# Patient Record
Sex: MALE | Race: WHITE | NOT HISPANIC OR LATINO | Employment: OTHER | URBAN - METROPOLITAN AREA
[De-identification: names, ages, dates, MRNs, and addresses within clinical notes are randomized per-mention and may not be internally consistent; named-entity substitution may affect disease eponyms.]

---

## 2018-09-25 ENCOUNTER — OFFICE VISIT (OUTPATIENT)
Dept: FAMILY MEDICINE | Facility: CLINIC | Age: 53
End: 2018-09-25
Payer: COMMERCIAL

## 2018-09-25 VITALS
BODY MASS INDEX: 27.46 KG/M2 | RESPIRATION RATE: 16 BRPM | WEIGHT: 155 LBS | TEMPERATURE: 98.1 F | HEART RATE: 102 BPM | OXYGEN SATURATION: 95 % | HEIGHT: 63 IN | DIASTOLIC BLOOD PRESSURE: 105 MMHG | SYSTOLIC BLOOD PRESSURE: 172 MMHG

## 2018-09-25 DIAGNOSIS — I10 ESSENTIAL HYPERTENSION WITH GOAL BLOOD PRESSURE LESS THAN 140/90: ICD-10-CM

## 2018-09-25 DIAGNOSIS — Z00.00 ROUTINE GENERAL MEDICAL EXAMINATION AT A HEALTH CARE FACILITY: Primary | ICD-10-CM

## 2018-09-25 LAB
ALBUMIN SERPL-MCNC: 4.3 G/DL (ref 3.4–5)
ALP SERPL-CCNC: 69 U/L (ref 40–150)
ALT SERPL W P-5'-P-CCNC: 70 U/L (ref 0–70)
ANION GAP SERPL CALCULATED.3IONS-SCNC: 10 MMOL/L (ref 3–14)
AST SERPL W P-5'-P-CCNC: 35 U/L (ref 0–45)
BILIRUB SERPL-MCNC: 1 MG/DL (ref 0.2–1.3)
BUN SERPL-MCNC: 15 MG/DL (ref 7–30)
CALCIUM SERPL-MCNC: 9.4 MG/DL (ref 8.5–10.1)
CHLORIDE SERPL-SCNC: 104 MMOL/L (ref 94–109)
CHOLEST SERPL-MCNC: 270 MG/DL
CO2 SERPL-SCNC: 24 MMOL/L (ref 20–32)
CREAT SERPL-MCNC: 0.97 MG/DL (ref 0.66–1.25)
GFR SERPL CREATININE-BSD FRML MDRD: 80 ML/MIN/1.7M2
GLUCOSE SERPL-MCNC: 117 MG/DL (ref 70–99)
HCV AB SERPL QL IA: NONREACTIVE
HDLC SERPL-MCNC: 51 MG/DL
HIV 1+2 AB+HIV1 P24 AG SERPL QL IA: NONREACTIVE
LDLC SERPL CALC-MCNC: 165 MG/DL
NONHDLC SERPL-MCNC: 219 MG/DL
POTASSIUM SERPL-SCNC: 3.9 MMOL/L (ref 3.4–5.3)
PROT SERPL-MCNC: 8.1 G/DL (ref 6.8–8.8)
SODIUM SERPL-SCNC: 138 MMOL/L (ref 133–144)
TRIGL SERPL-MCNC: 271 MG/DL

## 2018-09-25 PROCEDURE — 86803 HEPATITIS C AB TEST: CPT | Performed by: NURSE PRACTITIONER

## 2018-09-25 PROCEDURE — 99386 PREV VISIT NEW AGE 40-64: CPT | Performed by: NURSE PRACTITIONER

## 2018-09-25 PROCEDURE — 36415 COLL VENOUS BLD VENIPUNCTURE: CPT | Performed by: NURSE PRACTITIONER

## 2018-09-25 PROCEDURE — 80061 LIPID PANEL: CPT | Performed by: NURSE PRACTITIONER

## 2018-09-25 PROCEDURE — 80053 COMPREHEN METABOLIC PANEL: CPT | Performed by: NURSE PRACTITIONER

## 2018-09-25 PROCEDURE — 87389 HIV-1 AG W/HIV-1&-2 AB AG IA: CPT | Performed by: NURSE PRACTITIONER

## 2018-09-25 PROCEDURE — 99213 OFFICE O/P EST LOW 20 MIN: CPT | Mod: 25 | Performed by: NURSE PRACTITIONER

## 2018-09-25 PROCEDURE — 82043 UR ALBUMIN QUANTITATIVE: CPT | Performed by: NURSE PRACTITIONER

## 2018-09-25 RX ORDER — LISINOPRIL 5 MG/1
5 TABLET ORAL DAILY
Qty: 30 TABLET | Refills: 0 | Status: SHIPPED | OUTPATIENT
Start: 2018-09-25 | End: 2018-10-18

## 2018-09-25 NOTE — PROGRESS NOTES
SUBJECTIVE:   CC: Yan Young is an 53 year old male who presents for preventative health visit.     Physical   Annual:     Getting at least 3 servings of Calcium per day:  Yes    Bi-annual eye exam:  Yes    Dental care twice a year:  Yes    Sleep apnea or symptoms of sleep apnea:  None    Diet:  Regular (no restrictions)    Frequency of exercise:  2-3 days/week    Duration of exercise:  Greater than 60 minutes    Taking medications regularly:  Yes    Medication side effects:  None    Additional concerns today:  YES  Discuss possible hernia.  Sometimes patient experience severe lower back pain.    Today's PHQ-2 Score:   PHQ-2 ( 1999 Pf[employer]) 9/25/2018   Q1: Little interest or pleasure in doing things 0   Q2: Feeling down, depressed or hopeless 0   PHQ-2 Score 0   Q1: Little interest or pleasure in doing things Not at all   Q2: Feeling down, depressed or hopeless Not at all   PHQ-2 Score 0     Abuse: Current or Past(Physical, Sexual or Emotional)- No  Do you feel safe in your environment - Yes    Social History   Substance Use Topics     Smoking status: Never Smoker     Smokeless tobacco: Never Used     Alcohol use Yes      Comment: minimall     Alcohol Use 9/25/2018   If you drink alcohol do you typically have greater than 3 drinks per day OR greater than 7 drinks per week? No     Last PSA: No results found for: PSA    Reviewed orders with patient. Reviewed health maintenance and updated orders accordingly - Yes    Reviewed and updated as needed this visit by clinical staff  Tobacco  Allergies  Meds  Problems  Med Hx  Surg Hx  Fam Hx  Soc Hx          Reviewed and updated as needed this visit by Provider  Tobacco  Allergies  Meds  Problems  Med Hx  Surg Hx  Fam Hx  Soc Hx       biometric screen for insurance  Eats a well balanced diet.    Low sodium  BP's have been elevated the the past but he has never taken any BP medications.    Was told his BP's were higher but was OK to wait on starting  "medications.    Brother has high blood pressure.      Review of Systems  CONSTITUTIONAL: NEGATIVE for fever, chills, change in weight  INTEGUMENTARY/SKIN: NEGATIVE for worrisome rashes, moles or lesions  EYES: NEGATIVE for vision changes or irritation  ENT: NEGATIVE for ear, mouth and throat problems  RESP: NEGATIVE for significant cough or SOB  CV: NEGATIVE for chest pain, palpitations or peripheral edema  GI: NEGATIVE for nausea, abdominal pain, heartburn, or change in bowel habits   male: negative for dysuria, hematuria, decreased urinary stream, erectile dysfunction, urethral discharge  MUSCULOSKELETAL: NEGATIVE for significant arthralgias or myalgia  NEURO: NEGATIVE for weakness, dizziness or paresthesias  PSYCHIATRIC: NEGATIVE for changes in mood or affect    OBJECTIVE:   BP (!) 172/105  Pulse 102  Temp 98.1  F (36.7  C) (Oral)  Resp 16  Ht 5' 3\" (1.6 m)  Wt 155 lb (70.3 kg)  SpO2 95%  BMI 27.46 kg/m2    Physical Exam  GENERAL: healthy, alert and no distress  EYES: Eyes grossly normal to inspection, PERRL and conjunctivae and sclerae normal  HENT: ear canals and TM's normal, nose and mouth without ulcers or lesions  NECK: no adenopathy, no asymmetry, masses, or scars and thyroid normal to palpation  RESP: lungs clear to auscultation - no rales, rhonchi or wheezes  CV: regular rate and rhythm, normal S1 S2, no S3 or S4, no murmur, click or rub, no peripheral edema and peripheral pulses strong  ABDOMEN: soft, nontender, no hepatosplenomegaly, no masses and bowel sounds normal   (male): normal male genitalia without lesions or urethral discharge, no hernia  MS: no gross musculoskeletal defects noted, no edema  SKIN: no suspicious lesions or rashes  NEURO: Normal strength and tone, mentation intact and speech normal  PSYCH: mentation appears normal, affect normal/bright      ASSESSMENT/PLAN:       ICD-10-CM    1. Routine general medical examination at a health care facility Z00.00 Hepatitis C antibody " "    HIV Antigen Antibody Combo     Lipid Profile (Chol, Trig, HDL, LDL calc)     Comprehensive metabolic panel     CANCELED: Glucose   2. Essential hypertension with goal blood pressure less than 140/90 I10 lisinopril (PRINIVIL/ZESTRIL) 5 MG tablet     Albumin Random Urine Quantitative with Creat Ratio     OFFICE/OUTPT VISIT,EST,LEVL III      well male,  fasting for labs.  Encouraged to continue exercise and healthy diet choices.      BP markedly elevated today.    Discussed sodium restriction, maintaining ideal body weight and regular exercise program as physiologic means to achieve blood pressure control. The patient will strive towards this. Meanwhile, it is appropriate to lower BP with medications, while observing for therapeutic effect and if appropriate later, can discontinue medications if physiologic methods appear to be effective. The patient indicates understanding of these issues and agrees with the plan. Side effects explained in detail. Continue home readings and return in 1-2 months.  Discussed long term complications of untreated htn.    COUNSELING:   Reviewed preventive health counseling, as reflected in patient instructions    BP Readings from Last 1 Encounters:   09/25/18 (!) 172/105     Estimated body mass index is 27.46 kg/(m^2) as calculated from the following:    Height as of this encounter: 5' 3\" (1.6 m).    Weight as of this encounter: 155 lb (70.3 kg).      Weight management plan: Discussed healthy diet and exercise guidelines and patient will follow up in 3 months in clinic to re-evaluate.     reports that he has never smoked. He has never used smokeless tobacco.      Counseling Resources:  ATP IV Guidelines  Pooled Cohorts Equation Calculator  FRAX Risk Assessment  ICSI Preventive Guidelines  Dietary Guidelines for Americans, 2010  USDA's MyPlate  ASA Prophylaxis  Lung CA Screening    JACKLYN Lowe CNP  [facility]  Answers for HPI/ROS submitted by the patient " on 9/25/2018   PHQ-2 Score: 0

## 2018-09-25 NOTE — MR AVS SNAPSHOT
After Visit Summary   9/25/2018    Yan Young    MRN: [id number]           Patient Information     Date Of Birth          1965        Visit Information        Provider Department      9/25/2018 9:00 AM Kimberly Field APRN [facility]        Today's Diagnoses     Routine general medical examination at a health care facility    -  1    Essential hypertension with goal blood pressure less than 140/90          Care Instructions      Preventive Health Recommendations  Male Ages 50 - 64    Yearly exam:             See your health care provider every year in order to  o   Review health changes.   o   Discuss preventive care.    o   Review your medicines if your doctor has prescribed any.     Have a cholesterol test every 5 years, or more frequently if you are at risk for high cholesterol/heart disease.     Have a diabetes test (fasting glucose) every three years. If you are at risk for diabetes, you should have this test more often.     Have a colonoscopy at age 50, or have a yearly FIT test (stool test). These exams will check for colon cancer.      Talk with your health care provider about whether or not a prostate cancer screening test (PSA) is right for you.    You should be tested each year for STDs (sexually transmitted diseases), if you re at risk.     Shots: Get a flu shot each year. Get a tetanus shot every 10 years.     Nutrition:    Eat at least 5 servings of fruits and vegetables daily.     Eat whole-grain bread, whole-wheat pasta and brown rice instead of white grains and rice.     Get adequate Calcium and Vitamin D.     Lifestyle    Exercise for at least 150 minutes a week (30 minutes a day, 5 days a week). This will help you control your weight and prevent disease.     Limit alcohol to one drink per day.     No smoking.     Wear sunscreen to prevent skin cancer.     See your dentist every six months for an exam and cleaning.     See your eye doctor every 1  "to 2 years.            Follow-ups after your visit        Follow-up notes from your care team     Return in about 4 weeks (around 10/23/2018) for BP Recheck.      Who to contact     If you have questions or need follow up information about today's clinic visit or your schedule please contact [facility] directly at [phone].  Normal or non-critical lab and imaging results will be communicated to you by TearSolutionshart, letter or phone within 4 business days after the clinic has received the results. If you do not hear from us within 7 days, please contact the clinic through Cynvect or phone. If you have a critical or abnormal lab result, we will notify you by phone as soon as possible.  Submit refill requests through Rent[employer]Q or call your pharmacy and they will forward the refill request to us. Please allow 3 business days for your refill to be completed.          Additional Information About Your Visit        [employer] Information     Rent[employer] gives you secure access to your electronic health record. If you see a primary care provider, you can also send messages to your care team and make appointments. If you have questions, please call your primary care clinic.  If you do not have a primary care provider, please call [phone] and they will assist you.        Care EveryWhere ID     This is your Care EveryWhere ID. This could be used by other organizations to access your [facility] medical records  [id number]        Your Vitals Were     Pulse Temperature Respirations Height Pulse Oximetry BMI (Body Mass Index)    102 98.1  F (36.7  C) (Oral) 16 5' 3\" (1.6 m) 95% 27.46 kg/m2       Blood Pressure from Last 3 Encounters:   09/25/18 (!) 172/105   02/16/15 158/88    Weight from Last 3 Encounters:   09/25/18 155 lb (70.3 kg)   02/16/15 163 lb (73.9 kg)              We Performed the Following     Albumin Random Urine Quantitative with Creat Ratio     Comprehensive metabolic panel     Hepatitis C antibody     " HIV Antigen Antibody Combo     Lipid Profile (Chol, Trig, HDL, LDL calc)     OFFICE/OUTPT VISIT,EST,LEVL III          Today's Medication Changes          These changes are accurate as of 9/25/18  2:42 PM.  If you have any questions, ask your nurse or doctor.               Start taking these medicines.        Dose/Directions    lisinopril 5 MG tablet   Commonly known as:  PRINIVIL/ZESTRIL   Used for:  Essential hypertension with goal blood pressure less than 140/90   Started by:  Kimberly Field APRN CNP        Dose:  5 mg   Take 1 tablet (5 mg) by mouth daily   Quantity:  30 tablet   Refills:  0            Where to get your medicines      These medications were sent to [id number] [facility] - [place], [place] - [address] [place] [address]  [address], [place] [address]     Phone:  [phone]     lisinopril 5 MG tablet                Primary Care Provider Office Phone # Fax #    Lenny Kalpesh Parson [phone] [address]  [facility] [address]        Equal Access to Services     [facility]: Hadii aad ku hadasho Soomaali, waaxda luqadaha, qaybta kaalmada adeegyada, waxay idiin haygaetano smallwood . So [facility] [phone].    ATENCIÓN: Si habla español, tiene a maher disposición servicios gratuitos de asistencia lingüística. Llame al [phone].    We comply with applicable federal civil rights laws and [place] laws. We do not discriminate on the basis of race, color, national origin, age, disability, sex, sexual orientation, or gender identity.            Thank you!     Thank you for choosing [facility]  for your care. Our goal is always to provide you with excellent care. Hearing back from our patients is one way we can continue to improve our services. Please take a few minutes to complete the written survey that you may receive in the mail after your visit with us. Thank you!             Your Updated Medication List - Protect others around you:  Learn how to safely use, store and throw away your medicines at www.disposemymeds.org.          This list is accurate as of 9/25/18  2:42 PM.  Always use your most recent med list.                   Brand Name Dispense Instructions for use Diagnosis    lisinopril 5 MG tablet    PRINIVIL/ZESTRIL    30 tablet    Take 1 tablet (5 mg) by mouth daily    Essential hypertension with goal blood pressure less than 140/90

## 2018-09-26 ENCOUNTER — TELEPHONE (OUTPATIENT)
Dept: FAMILY MEDICINE | Facility: CLINIC | Age: 53
End: 2018-09-26

## 2018-09-26 LAB
CREAT UR-MCNC: 124 MG/DL
MICROALBUMIN UR-MCNC: 11 MG/L
MICROALBUMIN/CREAT UR: 8.55 MG/G CR (ref 0–17)

## 2018-10-18 ENCOUNTER — TELEPHONE (OUTPATIENT)
Dept: FAMILY MEDICINE | Facility: CLINIC | Age: 53
End: 2018-10-18

## 2018-10-18 ENCOUNTER — OFFICE VISIT (OUTPATIENT)
Dept: FAMILY MEDICINE | Facility: CLINIC | Age: 53
End: 2018-10-18
Payer: COMMERCIAL

## 2018-10-18 VITALS
RESPIRATION RATE: 16 BRPM | DIASTOLIC BLOOD PRESSURE: 94 MMHG | WEIGHT: 158.5 LBS | TEMPERATURE: 97.9 F | OXYGEN SATURATION: 95 % | HEART RATE: 86 BPM | SYSTOLIC BLOOD PRESSURE: 173 MMHG | BODY MASS INDEX: 28.08 KG/M2

## 2018-10-18 DIAGNOSIS — N50.819 PAIN IN TESTICLE: ICD-10-CM

## 2018-10-18 DIAGNOSIS — N50.819 TESTICLE PAIN: Primary | ICD-10-CM

## 2018-10-18 DIAGNOSIS — I10 ESSENTIAL HYPERTENSION WITH GOAL BLOOD PRESSURE LESS THAN 140/90: Primary | ICD-10-CM

## 2018-10-18 PROCEDURE — 99213 OFFICE O/P EST LOW 20 MIN: CPT | Performed by: NURSE PRACTITIONER

## 2018-10-18 RX ORDER — LISINOPRIL 10 MG/1
10 TABLET ORAL DAILY
Qty: 30 TABLET | Refills: 0 | Status: SHIPPED | OUTPATIENT
Start: 2018-10-18 | End: 2018-11-15

## 2018-10-18 NOTE — MR AVS SNAPSHOT
After Visit Summary   10/18/2018    Yan Young    MRN: [id number]           Patient Information     Date Of Birth          1965        Visit Information        Provider Department      10/18/2018 11:00 AM Kimberly Field APRN CNP [facility]        Today's Diagnoses     Essential hypertension with goal blood pressure less than 140/90    -  1    Pain in testicle           Follow-ups after your visit        Future tests that were ordered for you today     Open Future Orders        Priority Expected Expires Ordered    US Abdomen Limited Routine  10/18/2019 10/18/2018            Who to contact     If you have questions or need follow up information about today's clinic visit or your schedule please contact [facility] directly at [phone].  Normal or non-critical lab and imaging results will be communicated to you by I & Combinehart, letter or phone within 4 business days after the clinic has received the results. If you do not hear from us within 7 days, please contact the clinic through I & Combinehart or phone. If you have a critical or abnormal lab result, we will notify you by phone as soon as possible.  Submit refill requests through [employer] or call your pharmacy and they will forward the refill request to us. Please allow 3 business days for your refill to be completed.          Additional Information About Your Visit        MyChart Information     [employer] gives you secure access to your electronic health record. If you see a primary care provider, you can also send messages to your care team and make appointments. If you have questions, please call your primary care clinic.  If you do not have a primary care provider, please call [phone] and they will assist you.        Care EveryWhere ID     This is your Care EveryWhere ID. This could be used by other organizations to access your [place] medical records  [id number]        Your Vitals Were     Pulse  Temperature Respirations Pulse Oximetry BMI (Body Mass Index)       86 97.9  F (36.6  C) (Oral) 16 95% 28.08 kg/m2        Blood Pressure from Last 3 Encounters:   10/18/18 (!) 173/94   09/25/18 (!) 172/105   02/16/15 158/88    Weight from Last 3 Encounters:   10/18/18 158 lb 8 oz (71.9 kg)   09/25/18 155 lb (70.3 kg)   02/16/15 163 lb (73.9 kg)                 Today's Medication Changes          These changes are accurate as of 10/18/18 12:31 PM.  If you have any questions, ask your nurse or doctor.               These medicines have changed or have updated prescriptions.        Dose/Directions    lisinopril 10 MG tablet   Commonly known as:  PRINIVIL/ZESTRIL   This may have changed:    - medication strength  - how much to take   Used for:  Essential hypertension with goal blood pressure less than 140/90   Changed by:  Kimberly Field APRN CNP        Dose:  10 mg   Take 1 tablet (10 mg) by mouth daily   Quantity:  30 tablet   Refills:  0            Where to get your medicines      These medications were sent to [facility]pharm[facility] #[id number] - [place], [place] - [address], [place] [address]     Phone:  [phone]     lisinopril 10 MG tablet                Primary Care Provider Office Phone # Fax #    Lenny Posey Afshin Parson [phone]       [facility] [address]        Equal Access to Services     [facility]: Hadii rasheed ku hadasho Soomaali, waaxda luqadaha, qaybta kaalmada adeegyada, waxay pastora castillo. So [facility] [phone].    ATENCIÓN: Si habla español, tiene a maher disposición servicios gratuitos de asistencia lingüística. Llame al [phone].    We comply with applicable federal civil rights laws and [place] laws. We do not discriminate on the basis of race, color, national origin, age, disability, sex, sexual orientation, or gender identity.            Thank you!     Thank you for choosing [facility]  for your care. Our goal is always to provide you with excellent care. Hearing back from our patients is one way we can continue to improve our services. Please take a few minutes to complete the written survey that you may receive in the mail after your visit with us. Thank you!             Your Updated Medication List - Protect others around you: Learn how to safely use, store and throw away your medicines at www.disposemymeds.org.          This list is accurate as of 10/18/18 12:31 PM.  Always use your most recent med list.                   Brand Name Dispense Instructions for use Diagnosis    lisinopril 10 MG tablet    PRINIVIL/ZESTRIL    30 tablet    Take 1 tablet (10 mg) by mouth daily    Essential hypertension with goal blood pressure less than 140/90

## 2018-10-18 NOTE — PROGRESS NOTES
SUBJECTIVE:   Yan Young is a 53 year old male who presents to clinic today for the following health issues:      Follow up last OV 9/25/18.   BP's remain elevated.   Reports BP's at home around 160/100 prior to caffeine.      Taking lisinopril daily in the morning.    Wants to get lipids/BMI/ BP/glucose data to get insurance premium reduction.     He is currently failing all measures per the insurers standards.      Also c/o pain in right testicle when he is exerting himself.    Felt a bulge and is wondering if he has a hernia.    When he sat down was able to palpate a small fluid filled sac that he was able to push back in.      Problem list and histories reviewed & adjusted, as indicated.  Additional history: as documented    Reviewed and updated as needed this visit by clinical staff  Tobacco  Allergies  Meds  Problems  Med Hx  Surg Hx  Fam Hx  Soc Hx        Reviewed and updated as needed this visit by Provider  Tobacco  Allergies  Meds  Problems  Med Hx  Surg Hx  Fam Hx  Soc Hx          ROS:  CONSTITUTIONAL: NEGATIVE for fever, chills, change in weight  INTEGUMENTARY/SKIN: NEGATIVE for worrisome rashes, moles or lesions  RESP: NEGATIVE for significant cough or SOB  CV: NEGATIVE for chest pain, palpitations or peripheral edema  MUSCULOSKELETAL: NEGATIVE for significant arthralgias or myalgia    OBJECTIVE:     BP (!) 173/94  Pulse 86  Temp 97.9  F (36.6  C) (Oral)  Resp 16  Wt 158 lb 8 oz (71.9 kg)  SpO2 95%  BMI 28.08 kg/m2  Body mass index is 28.08 kg/(m^2).  GENERAL: healthy, alert and no distress  RESP: lungs clear to auscultation - no rales, rhonchi or wheezes  CV: regular rate and rhythm, normal S1 S2, no S3 or S4, no murmur, click or rub, no peripheral edema and peripheral pulses strong  MS: no gross musculoskeletal defects noted, no edema  SKIN: no suspicious lesions or rashes      ASSESSMENT/PLAN:     Hypertension; slightly worsened   Associated with the following  complications:     Plan:  Medications:     Increase lisinopril to 10 mg, f/u in 1 week.              ICD-10-CM    1. Essential hypertension with goal blood pressure less than 140/90 I10 lisinopril (PRINIVIL/ZESTRIL) 10 MG tablet   2. Pain in testicle N50.819 US Abdomen Limited     BP remains elevated.  Will increase lisinopril to 10 mg daily.    Reduce sodium in the diet.    Weight is up 3 pounds in the last 3 weeks.    For his insurance premium reduction the   BMI threshold was 27.5.  This weight gain placed him outside the parameter for credit.   BP threshold was 140/90, he is also outside this parameter.    Fasting glucose was under 115 and his last fasting glucose was 117, he is also outside this parameter.    Lipid threshold was 135 for non HDL.  His lipids are outside or this parameter.    He is trying to get insurance premium reduction, may come in 1 week for recheck to try to get BP lowered and weight down before the end of the month when the forms are due to the workplace.      See Patient Instructions    JACKLYN Lowe CNP  [facility]

## 2018-10-18 NOTE — TELEPHONE ENCOUNTER
MARY Field the US tech at  requested you change the imaging order to right testicular US with doppler for the imagine appt for tomorrow.  ernie'd up but answer a few questions.   Yasemin Richardson RN

## 2018-10-19 ENCOUNTER — HOSPITAL ENCOUNTER (OUTPATIENT)
Dept: ULTRASOUND IMAGING | Facility: CLINIC | Age: 53
End: 2018-10-19
Attending: NURSE PRACTITIONER
Payer: COMMERCIAL

## 2018-10-19 DIAGNOSIS — N50.819 TESTICLE PAIN: ICD-10-CM

## 2018-10-19 PROCEDURE — 76870 US EXAM SCROTUM: CPT

## 2018-10-29 ENCOUNTER — TELEPHONE (OUTPATIENT)
Dept: FAMILY MEDICINE | Facility: CLINIC | Age: 53
End: 2018-10-29

## 2018-10-29 ENCOUNTER — NURSE TRIAGE (OUTPATIENT)
Dept: NURSING | Facility: CLINIC | Age: 53
End: 2018-10-29

## 2018-10-29 DIAGNOSIS — K40.90 RIGHT INGUINAL HERNIA: Primary | ICD-10-CM

## 2018-10-29 NOTE — TELEPHONE ENCOUNTER
Appears there is a right inguinal hernia. Depending on symptoms, this could correlate. After reviewing Kimberly's note from last visit it seems that the hernia is the cause of his symptoms. Would suggest consultation with general surgery at [facility].  Alternatively could come in to discuss with Kimberly or myself.      Lenny Parson MD  Family Medicine Physician

## 2018-10-29 NOTE — TELEPHONE ENCOUNTER
"Clinic Action Needed:Yes, Please call patient at .   Reason for Call:Patient calling stating he was advised he would receive ultrasound results last week. Patient requesting results. Reports ongoing pain in testicle \"it never went away.\" Denies change or increase in symptoms.     Caller verbalized understanding. Denies further questions.    Kimberly Dominguez RN  [facility]      "

## 2018-10-29 NOTE — TELEPHONE ENCOUNTER
Patient calls back and would like referral to general surgery. My chart referral message sent per his preference.  Yasemin Richardson RN

## 2018-10-29 NOTE — TELEPHONE ENCOUNTER
"Clinic Action Needed:Yes, Please call patient at .   Reason for Call:Patient calling stating he was advised he would receive ultrasound results last week. US testicular and scrotal done 10/19/18.  Patient is requesting results. Reports ongoing pain in testicle \"it never went away.\" Denies change or increase in symptoms.     Caller verbalized understanding. Denies further questions.    Kimberly Dominguez RN  [facility]      "

## 2018-11-09 ENCOUNTER — TELEPHONE (OUTPATIENT)
Dept: SURGERY | Facility: CLINIC | Age: 53
End: 2018-11-09

## 2018-11-09 NOTE — TELEPHONE ENCOUNTER
Pre Visit Call and Assessment    Date of call:  11/09/2018    Phone numbers:  Home number on file [phone] (home)    Reached patient/confirmed appointment:  No - left message:   on voicemail  Patient care team/Primary provider:  Lenny Shaw  Referred to:  Dr. Alexa Enriquez    Reason for visit:  Hernia consult

## 2018-11-12 ENCOUNTER — OFFICE VISIT (OUTPATIENT)
Dept: SURGERY | Facility: CLINIC | Age: 53
End: 2018-11-12
Payer: COMMERCIAL

## 2018-11-12 ENCOUNTER — PATIENT OUTREACH (OUTPATIENT)
Dept: SURGERY | Facility: CLINIC | Age: 53
End: 2018-11-12

## 2018-11-12 VITALS
OXYGEN SATURATION: 95 % | HEART RATE: 115 BPM | BODY MASS INDEX: 28 KG/M2 | SYSTOLIC BLOOD PRESSURE: 165 MMHG | TEMPERATURE: 97.8 F | WEIGHT: 158 LBS | HEIGHT: 63 IN | DIASTOLIC BLOOD PRESSURE: 130 MMHG

## 2018-11-12 DIAGNOSIS — K40.90 UNILATERAL INGUINAL HERNIA WITHOUT OBSTRUCTION OR GANGRENE, RECURRENCE NOT SPECIFIED: Primary | ICD-10-CM

## 2018-11-12 RX ORDER — CEFAZOLIN SODIUM 1 G/50ML
1 INJECTION, SOLUTION INTRAVENOUS SEE ADMIN INSTRUCTIONS
Status: CANCELLED | OUTPATIENT
Start: 2018-11-12

## 2018-11-12 RX ORDER — CEFAZOLIN SODIUM 2 G/50ML
2 SOLUTION INTRAVENOUS
Status: CANCELLED | OUTPATIENT
Start: 2018-11-12

## 2018-11-12 ASSESSMENT — PAIN SCALES - GENERAL: PAINLEVEL: NO PAIN (0)

## 2018-11-12 NOTE — NURSING NOTE
"Chief Complaint   Patient presents with     Consult     [facility]        Vitals:    11/12/18 1250   BP: (!) 165/130   Pulse: 115   Temp: 97.8  F (36.6  C)   TempSrc: Oral   SpO2: 95%   Weight: 158 lb   Height: 5' 3\"       Body mass index is 27.99 kg/(m^2).    Ernesto Mcnair on 11/12/2018 at 12:53 PM                          "

## 2018-11-12 NOTE — PATIENT INSTRUCTIONS
You met with Dr. Alexa Enriquez.      Today's visit instructions:    You will need to meet with your PCP for a pre op history and physical prior to surgery. This will need to be done within 30 days of your surgery date.        If you have questions please contact Jaiden RN or Leia RN during regular clinic hours, Monday through Friday 7:30 AM - 4:00 PM, or you can contact us via ComCam at anytime.       If you have urgent needs after-hours, weekends, or holidays please call the hospital at [phone] and ask to speak with our on-call General Surgery Team.    Appointment [phone], option #1   Nurse Advice (Jaiden or Leia): [phone]   Surgery Scheduler (Keyla): [phone]  Fax: [phone]      After Your Laparoscopic Inguinal Hernia Repair         Incision care     Remove the bandage the day after surgery, but leave the medical tape (Steri-Strips) or glue in place. These will loosen and fall off on their own 5 to 7 days after surgery.     You may take a shower the day after surgery. Carefully wash your incision with soap and water. Do not submerge yourself in water (bath, whirlpool, hot tub, pool, lake) for 14 days after surgery.       Always wash your hands before touching your incisions or removing bandages.      Other medicines     Wait to start aspirin or blood thinners until the day after surgery. You can continue your regular medicines at your normal time the day after surgery.     Your pain medicine may cause constipation (hard, dry stools). To help with this, take the stool softener your doctor gave you or an over-the-counter stool softener or laxative. You can stop taking this when you are no longer taking pain medicine and your bowel movements are back to normal.      For pain or discomfort     Take the narcotic pain medicine your doctor gave you as needed and as instructed on the bottle. If you prefer to use over-the-counter medication, use acetaminophen (Tylenol) or ibuprofen  (Advil, Motrin) as instructed on the box. Do not take Tylenol if it is in your narcotic pain medication.     Use an ice pack on your groin for 20 minutes at a time as needed for the first 24 hours. Be sure to protect your skin by putting a cloth between the ice pack and your skin.     After 24 hours you can switch to heat for 20 minutes as needed. Be sure to protect your skin by putting a cloth between the heat pack and your skin.     It is normal to feel a lump in your groin after surgery. This lump may take up to 8 weeks to go away.      Activities     No driving until you feel it s safe to do so. Don t drive while taking narcotic pain medicine.     You can return to your other activities as normal, no restrictions.      Special equipment     If we gave you an athletic supporter, wear this for the first 3 days after surgery. You can wear it longer than this if you wish.      Diet     You can eat your regular meals after surgery.      When to call the doctor   Call your doctor if you have:     A fever above 101 F (38.3 C) (taken under the tongue), or a fever or chills lasting more than a day.     Redness at the incision site.     Any fluid or blood draining from the incision, especially if it smells bad.      Severe pain that doesn t improve with pain medicine.      Go to the emergency room if:   You can t urinate (pee) or feel pressure when you urinate. We will place a small, thin tube (catheter) to empty your bladder. This needs to stay in place for at least 2 days.      We will call you 2 to 4 days after surgery to review this handout, answer questions and help arrange after-surgery care. If you have questions or concerns, please call [phone] during regular office hours. If you need to call after business hours, call [phone] and ask to page the surgeon on-call.

## 2018-11-12 NOTE — LETTER
"11/12/2018       RE: Yan Young  [address] [place] [address]     Dear Colleague,    Thank you for referring your patient, Yan Young, to the [facility] GENERAL SURGERY at [facility]. Please see a copy of my visit note below.    Surgery Clinic Note    Reason for visit:  Right inguinal hernia    HPI:    Yan Young is a 53M with PMHx significant for HTN, who presents for evaluation of a right inguinal hernia.  He states that he has notice pain in his right groin region for ~ 6 month, which is worse with activity.  He went to his primary care physician last month thinking that there must be something \"torn\" down there.     The patient states that he also notices a right groin bulge when he stands or bears down.  This bulge goes away when he lies flat.  It is intermittently tender.    BP (!) 165/130  Pulse 115  Temp 97.8  F (36.6  C) (Oral)  Ht 1.6 m (5' 3\")  Wt 71.7 kg (158 lb)  SpO2 95%  BMI 27.99 kg/m2  RRR  CTAB  Abd soft, non distended, non tender  R groin- + hernia, no scrotal extension, testes normal and descended, hernia reduces easily with patient lying flat.    A/P:  [facility]  Will schedule for elective laparoscopic inguinal hernia repair at earliest date available.    Will update H&P on day of surgery    Alexa Enriquez  11/12/18    "

## 2018-11-12 NOTE — PROGRESS NOTES
Pre and Post op Patient Education/Teaching Flowsheet  Relevant Diagnosis:  Inguinal hernia  Teaching Topic:  Pre and post op teaching  Person(s) Involved in teaching:  Patient     Motivation Level:  Asks Questions:  Yes  Eager to Learn:  Yes  Cooperative:  Yes  Receptive (willing/able to accept information):  Yes  Any cultural factors/Scientologist beliefs that may influence understanding or compliance?  No    Patient/caregiver/family demonstrates understanding of the following:  Reason for the appointment, diagnosis, and treatment plan:  Yes  Patient demonstrates understanding of the following:  Pre-op bowel prep:  No  Post-op pain management recommendations (medications, ice compress, binder/athletic supporter (if applicable), etc.:  Yes  Inguinal hernia patients:  Post-op urinary retention- discussed signs/symptoms and visit to ER for Triplett catheter placement and to stay in place for at least 48 hours:  Yes  Restrictions:  Yes  Medications to take the day of surgery:  Per PCP  Blood thinner medications discussed and when to stop (if applicable):  Yes  Wound care:  Yes  Diabetes medication management (if applicable):  Per PCP  Which situations necessitate calling provider and whom to contact:  Discussed how to contact the hospital, nurse, and clinic scheduling staff if necessary      Date and time of surgery:  Yes  Location of surgery: [facility]- 5th Floor  History and Physical and any other testing necessary prior to surgery:  Yes. Dr. Enriquez will update the day of surgery.  Required time line for completion of History and Physical and any pre-op testing:  Yes  Discuss need for someone to drive patient home and stay with them for 24 hours:  Yes  Pre-op showering/scrub information with Surgical Scrub:  Yes  NPO Guidelines:  NPO per Anesthesia Guidelines    Infection Prevention: Patient demonstrates understanding of the following:  Patient instructed on hand hygiene:  Yes  Surgical  procedure site care will be taught and will be reviewed at the time of discharge  Signs and symptoms of infection taught:  Yes  Wound care reviewed and will be taught at the time of discharge  Central venous catheter care will be taught at the time of discharge (if applicable)    Post-op follow-up:  Instructional materials used/given/mailed:  [facility] Booklet, post op teaching sheet, Map, Soap, and arrival/location information    Surgical instructions mailed to patient

## 2018-11-12 NOTE — MR AVS SNAPSHOT
After Visit Summary   2018    Yan Young    MRN: [id number]           Patient Information     Date Of Birth          1965        Visit Information        Provider Department      2018 1:00 PM Alexa Enriquez MD [facility] Surgery        Today's Diagnoses     Unilateral inguinal hernia without obstruction or gangrene, recurrence not specified    -  1      Care Instructions    You met with Dr. Alexa Enriquez.      Today's visit instructions:    You will need to meet with your PCP for a pre op history and physical prior to surgery. This will need to be done within 30 days of your surgery date.        If you have questions please contact Jaiden RN or Leia RN during regular clinic hours, Monday through Friday 7:30 AM - 4:00 PM, or you can contact us via Docurated at anytime.       If you have urgent needs after-hours, weekends, or holidays please call the hospital at [phone] and ask to speak with our on-call General Surgery Team.    Appointment [phone], option #1   Nurse Advice (Jaiden or Leia): [phone]   Surgery Scheduler (Keyla): [phone]  Fax: [phone]      After Your Laparoscopic Inguinal Hernia Repair         Incision care     Remove the bandage the day after surgery, but leave the medical tape (Steri-Strips) or glue in place. These will loosen and fall off on their own 5 to 7 days after surgery.     You may take a shower the day after surgery. Carefully wash your incision with soap and water. Do not submerge yourself in water (bath, whirlpool, hot tub, pool, lake) for 14 days after surgery.       Always wash your hands before touching your incisions or removing bandages.      Other medicines     Wait to start aspirin or blood thinners until the day after surgery. You can continue your regular medicines at your normal time the day after surgery.     Your pain medicine may cause constipation (hard, dry stools). To help with this, take  the stool softener your doctor gave you or an over-the-counter stool softener or laxative. You can stop taking this when you are no longer taking pain medicine and your bowel movements are back to normal.      For pain or discomfort     Take the narcotic pain medicine your doctor gave you as needed and as instructed on the bottle. If you prefer to use over-the-counter medication, use acetaminophen (Tylenol) or ibuprofen (Advil, Motrin) as instructed on the box. Do not take Tylenol if it is in your narcotic pain medication.     Use an ice pack on your groin for 20 minutes at a time as needed for the first 24 hours. Be sure to protect your skin by putting a cloth between the ice pack and your skin.     After 24 hours you can switch to heat for 20 minutes as needed. Be sure to protect your skin by putting a cloth between the heat pack and your skin.     It is normal to feel a lump in your groin after surgery. This lump may take up to 8 weeks to go away.      Activities     No driving until you feel it s safe to do so. Don t drive while taking narcotic pain medicine.     You can return to your other activities as normal, no restrictions.      Special equipment     If we gave you an athletic supporter, wear this for the first 3 days after surgery. You can wear it longer than this if you wish.      Diet     You can eat your regular meals after surgery.      When to call the doctor   Call your doctor if you have:     A fever above 101 F (38.3 C) (taken under the tongue), or a fever or chills lasting more than a day.     Redness at the incision site.     Any fluid or blood draining from the incision, especially if it smells bad.      Severe pain that doesn t improve with pain medicine.      Go to the emergency room if:   You can t urinate (pee) or feel pressure when you urinate. We will place a small, thin tube (catheter) to empty your bladder. This needs to stay in place for at least 2 days.      We will call you 2 to 4  "days after surgery to review this handout, answer questions and help arrange after-surgery care. If you have questions or concerns, please call [phone] during regular office hours. If you need to call after business hours, call [phone] and ask to page the surgeon on-call.                                       Follow-ups after your visit        Who to contact     Please call your clinic at [phone] to:    Ask questions about your health    Make or cancel appointments    Discuss your medicines    Learn about your test results    Speak to your doctor            Additional Information About Your Visit        [employer] Information     [employer] gives you secure access to your electronic health record. If you see a primary care provider, you can also send messages to your care team and make appointments. If you have questions, please call your primary care clinic.  If you do not have a primary care provider, please call [phone] and they will assist you.      [employer] is an electronic gateway that provides easy, online access to your medical records. With [employer], you can request a clinic appointment, read your test results, renew a prescription or communicate with your care team.     To access your existing account, please contact your [facility] or call [phone] for assistance.        Care EveryWhere ID     This is your Care EveryWhere ID. This could be used by other organizations to access your [facility] medical records  [id number]        Your Vitals Were     Pulse Temperature Height Pulse Oximetry BMI (Body Mass Index)       115 97.8  F (36.6  C) (Oral) 1.6 m (5' 3\") 95% 27.99 kg/m2        Blood Pressure from Last 3 Encounters:   11/12/18 (!) 165/130   10/18/18 (!) 173/94   09/25/18 (!) 172/105    Weight from Last 3 Encounters:   11/12/18 71.7 kg (158 lb)   10/18/18 71.9 kg (158 lb 8 oz)   09/25/18 70.3 kg (155 lb)              Today, you had the following     No orders " found for display       Primary Care Provider Office Phone # Fax #    Lenny Posey Afshin Parson [phone]       [address]  [facility] [address]        Equal Access to Services     SOLOMON ZUNIGA : Hadii rasheed ku hadyungo Sotabbyali, waaxda luqadaha, qaybta kaalmada adeegyada, daniel idiin hayaan angeles matias laPreethigaetano castillo. So [facility] [phone].    ATENCIÓN: Si habla español, tiene a maher disposición servicios gratuitos de asistencia lingüística. Llame al [phone].    We comply with applicable federal civil rights laws and [place] laws. We do not discriminate on the basis of race, color, national origin, age, disability, sex, sexual orientation, or gender identity.            Thank you!     Thank you for choosing [facility]  for your care. Our goal is always to provide you with excellent care. Hearing back from our patients is one way we can continue to improve our services. Please take a few minutes to complete the written survey that you may receive in the mail after your visit with us. Thank you!             Your Updated Medication List - Protect others around you: Learn how to safely use, store and throw away your medicines at www.disposemymeds.org.          This list is accurate as of 11/12/18  1:22 PM.  Always use your most recent med list.                   Brand Name Dispense Instructions for use Diagnosis    lisinopril 10 MG tablet    PRINIVIL/ZESTRIL    30 tablet    Take 1 tablet (10 mg) by mouth daily    Essential hypertension with goal blood pressure less than 140/90

## 2018-11-12 NOTE — PROGRESS NOTES
"Surgery Clinic Note    Reason for visit:  Right inguinal hernia    HPI:    Yan Young is a 53M with PMHx significant for HTN, who presents for evaluation of a right inguinal hernia.  He states that he has notice pain in his right groin region for ~ 6 month, which is worse with activity.  He went to his primary care physician last month thinking that there must be something \"torn\" down there.     The patient states that he also notices a right groin bulge when he stands or bears down.  This bulge goes away when he lies flat.  It is intermittently tender.    BP (!) 165/130  Pulse 115  Temp 97.8  F (36.6  C) (Oral)  Ht 1.6 m (5' 3\")  Wt 71.7 kg (158 lb)  SpO2 95%  BMI 27.99 kg/m2  RRR  CTAB  Abd soft, non distended, non tender  R groin- + hernia, no scrotal extension, testes normal and descended, hernia reduces easily with patient lying flat.    A/P:  [facility]  Will schedule for elective laparoscopic inguinal hernia repair at earliest date available.    Will update H&P on day of surgery    Alexa Enriquez  11/12/18    "

## 2018-11-14 ENCOUNTER — TELEPHONE (OUTPATIENT)
Dept: SURGERY | Facility: CLINIC | Age: 53
End: 2018-11-14

## 2018-11-14 NOTE — TELEPHONE ENCOUNTER
Patient is scheduled for surgery with Dr. Alexa Enriquez    Left message for: Yan Young about surgery date. Patient had chosen 12/03/2018 when he was seen in clinic on 11/12/2018.    Date of Surgery: 12/03/2018 at 9:05 AM with Dr. Alexa Enriquez    H&P: To be scheduled with Lenny Shaw. Patient is aware that he can call to schedule a pre-op with the [facility] ([facility] if he is unable to schedule with his primary doctor.      Informed patient they will need an adult : YES    Surgery packet sent: Mailed 11/14/2018    Additional comments: He also knows to call general surgery if he experiences any cold or flu symptoms. Surgery teaching and soap were given to in clinic on 11/12/2018. He was asked to call [phone] if he needs to reschedule and [phone] if he experiences any symptoms.

## 2018-11-15 DIAGNOSIS — I10 ESSENTIAL HYPERTENSION WITH GOAL BLOOD PRESSURE LESS THAN 140/90: ICD-10-CM

## 2018-11-15 NOTE — TELEPHONE ENCOUNTER
"Requested Prescriptions   Pending Prescriptions Disp Refills     lisinopril (PRINIVIL/ZESTRIL) 10 MG tablet [Pharmacy Med Name: LISINOPRIL 10 MG TABLET]  Last Written Prescription Date:  10/18/2018  Last Fill Quantity: 30 tablet,  # refills: 0   Last Office Visit: 10/18/2018   Future Office Visit:      30 tablet 0     Sig: TAKE 1 TABLET BY MOUTH EVERY DAY    ACE Inhibitors (Including Combos) Protocol Failed    11/15/2018  4:08 AM       Failed - Blood pressure under 140/90 in past 12 months    BP Readings from Last 3 Encounters:   11/12/18 (!) 165/130   10/18/18 (!) 173/94   09/25/18 (!) 172/105          Passed - Recent (12 mo) or future (30 days) visit within the authorizing provider's specialty    Patient had office visit in the last 12 months or has a visit in the next 30 days with authorizing provider or within the authorizing provider's specialty.  See \"Patient Info\" tab in inbasket, or \"Choose Columns\" in Meds & Orders section of the refill encounter.           Passed - Patient is age 18 or older       Passed - Normal serum creatinine on file in past 12 months    Recent Labs   Lab Test  09/25/18   0941   CR  0.97          Passed - Normal serum potassium on file in past 12 months    Recent Labs   Lab Test  09/25/18   0941   POTASSIUM  3.9               "

## 2018-11-19 ENCOUNTER — MYC REFILL (OUTPATIENT)
Dept: FAMILY MEDICINE | Facility: CLINIC | Age: 53
End: 2018-11-19

## 2018-11-19 RX ORDER — LISINOPRIL 10 MG/1
TABLET ORAL
Qty: 30 TABLET | Refills: 0 | Status: SHIPPED | OUTPATIENT
Start: 2018-11-19 | End: 2018-12-24

## 2018-11-19 RX ORDER — LISINOPRIL 10 MG/1
10 TABLET ORAL DAILY
Qty: 30 TABLET | Refills: 0 | Status: CANCELLED | OUTPATIENT
Start: 2018-11-19

## 2018-11-19 NOTE — TELEPHONE ENCOUNTER
Message from Nicki:  Original authorizing provider: JACKLYN Lowe CNP    Yan Young would like a refill of the following medications:  lisinopril (PRINIVIL/ZESTRIL) 10 MG tablet [JACKLYN Lowe CNP]    Preferred pharmacy: [id number] - [place], [place] - [address]    Comment:

## 2018-11-19 NOTE — TELEPHONE ENCOUNTER
This request has been sent to provider today on another encounter - pending review     Closing this encounter     Christiaansa Greene Registered Nurse   [facility]

## 2018-11-19 NOTE — TELEPHONE ENCOUNTER
Routing refill request to provider for review/approval because:  Blood pressure above threshold.  Ana Laura Cameron RN

## 2018-11-30 ENCOUNTER — ANESTHESIA EVENT (OUTPATIENT)
Dept: SURGERY | Facility: AMBULATORY SURGERY CENTER | Age: 53
End: 2018-11-30

## 2018-12-03 ENCOUNTER — ANESTHESIA (OUTPATIENT)
Dept: SURGERY | Facility: AMBULATORY SURGERY CENTER | Age: 53
End: 2018-12-03

## 2018-12-03 ENCOUNTER — HOSPITAL ENCOUNTER (OUTPATIENT)
Facility: CLINIC | Age: 53
Setting detail: OBSERVATION
Discharge: HOME | End: 2018-12-04
Attending: SURGERY | Admitting: SURGERY
Payer: COMMERCIAL

## 2018-12-03 ENCOUNTER — APPOINTMENT (OUTPATIENT)
Dept: GENERAL RADIOLOGY | Facility: CLINIC | Age: 53
End: 2018-12-03
Attending: SURGERY
Payer: COMMERCIAL

## 2018-12-03 ENCOUNTER — HOSPITAL ENCOUNTER (OUTPATIENT)
Facility: AMBULATORY SURGERY CENTER | Age: 53
End: 2018-12-03
Attending: SURGERY
Payer: COMMERCIAL

## 2018-12-03 VITALS
TEMPERATURE: 98.2 F | BODY MASS INDEX: 27.46 KG/M2 | RESPIRATION RATE: 18 BRPM | HEIGHT: 63 IN | SYSTOLIC BLOOD PRESSURE: 123 MMHG | OXYGEN SATURATION: 93 % | WEIGHT: 155 LBS | DIASTOLIC BLOOD PRESSURE: 79 MMHG | HEART RATE: 92 BPM

## 2018-12-03 DIAGNOSIS — R06.03: ICD-10-CM

## 2018-12-03 DIAGNOSIS — J95.89: ICD-10-CM

## 2018-12-03 DIAGNOSIS — Z87.19 S/P INGUINAL HERNIA REPAIR: Primary | ICD-10-CM

## 2018-12-03 DIAGNOSIS — Z98.890 S/P INGUINAL HERNIA REPAIR: Primary | ICD-10-CM

## 2018-12-03 PROCEDURE — 71045 X-RAY EXAM CHEST 1 VIEW: CPT

## 2018-12-03 PROCEDURE — 25000132 ZZH RX MED GY IP 250 OP 250 PS 637: Performed by: SURGERY

## 2018-12-03 PROCEDURE — G0378 HOSPITAL OBSERVATION PER HR: HCPCS

## 2018-12-03 DEVICE — MESH KNITTED POLYPROPYLENE 06X6" MARLEX 0112720: Type: IMPLANTABLE DEVICE | Site: INGUINAL | Status: FUNCTIONAL

## 2018-12-03 RX ORDER — CEFAZOLIN SODIUM 2 G/50ML
2 SOLUTION INTRAVENOUS
Status: COMPLETED | OUTPATIENT
Start: 2018-12-03 | End: 2018-12-03

## 2018-12-03 RX ORDER — DEXAMETHASONE SODIUM PHOSPHATE 10 MG/ML
INJECTION, SOLUTION INTRAMUSCULAR; INTRAVENOUS PRN
Status: DISCONTINUED | OUTPATIENT
Start: 2018-12-03 | End: 2018-12-03

## 2018-12-03 RX ORDER — ONDANSETRON 2 MG/ML
INJECTION INTRAMUSCULAR; INTRAVENOUS PRN
Status: DISCONTINUED | OUTPATIENT
Start: 2018-12-03 | End: 2018-12-03

## 2018-12-03 RX ORDER — DOCUSATE SODIUM 100 MG/1
100 CAPSULE, LIQUID FILLED ORAL 2 TIMES DAILY
Status: CANCELLED | OUTPATIENT
Start: 2018-12-03

## 2018-12-03 RX ORDER — NALOXONE HYDROCHLORIDE 0.4 MG/ML
.1-.4 INJECTION, SOLUTION INTRAMUSCULAR; INTRAVENOUS; SUBCUTANEOUS
Status: CANCELLED | OUTPATIENT
Start: 2018-12-03

## 2018-12-03 RX ORDER — AMOXICILLIN 250 MG
2 CAPSULE ORAL 2 TIMES DAILY
Status: DISCONTINUED | OUTPATIENT
Start: 2018-12-03 | End: 2018-12-04 | Stop reason: HOSPADM

## 2018-12-03 RX ORDER — NALOXONE HYDROCHLORIDE 0.4 MG/ML
.1-.4 INJECTION, SOLUTION INTRAMUSCULAR; INTRAVENOUS; SUBCUTANEOUS
Status: DISCONTINUED | OUTPATIENT
Start: 2018-12-03 | End: 2018-12-04 | Stop reason: HOSPADM

## 2018-12-03 RX ORDER — ACETAMINOPHEN 325 MG/1
650 TABLET ORAL EVERY 4 HOURS PRN
Status: DISCONTINUED | OUTPATIENT
Start: 2018-12-06 | End: 2018-12-04 | Stop reason: HOSPADM

## 2018-12-03 RX ORDER — HYDROMORPHONE HYDROCHLORIDE 1 MG/ML
.3-.5 INJECTION, SOLUTION INTRAMUSCULAR; INTRAVENOUS; SUBCUTANEOUS
Status: DISCONTINUED | OUTPATIENT
Start: 2018-12-03 | End: 2018-12-04 | Stop reason: HOSPADM

## 2018-12-03 RX ORDER — SCOLOPAMINE TRANSDERMAL SYSTEM 1 MG/1
1 PATCH, EXTENDED RELEASE TRANSDERMAL
Status: DISCONTINUED | OUTPATIENT
Start: 2018-12-03 | End: 2018-12-04 | Stop reason: HOSPADM

## 2018-12-03 RX ORDER — BUPIVACAINE HYDROCHLORIDE 2.5 MG/ML
INJECTION, SOLUTION INFILTRATION; PERINEURAL PRN
Status: DISCONTINUED | OUTPATIENT
Start: 2018-12-03 | End: 2018-12-03 | Stop reason: HOSPADM

## 2018-12-03 RX ORDER — ALBUTEROL SULFATE 90 UG/1
AEROSOL, METERED RESPIRATORY (INHALATION) PRN
Status: DISCONTINUED | OUTPATIENT
Start: 2018-12-03 | End: 2018-12-03

## 2018-12-03 RX ORDER — ACETAMINOPHEN 325 MG/1
650 TABLET ORAL EVERY 4 HOURS PRN
Status: CANCELLED | OUTPATIENT
Start: 2018-12-06

## 2018-12-03 RX ORDER — GABAPENTIN 300 MG/1
300 CAPSULE ORAL ONCE
Status: DISCONTINUED | OUTPATIENT
Start: 2018-12-03 | End: 2018-12-04 | Stop reason: HOSPADM

## 2018-12-03 RX ORDER — OXYCODONE HYDROCHLORIDE 5 MG/1
5-10 TABLET ORAL
Status: DISCONTINUED | OUTPATIENT
Start: 2018-12-03 | End: 2018-12-04 | Stop reason: HOSPADM

## 2018-12-03 RX ORDER — ONDANSETRON 2 MG/ML
4 INJECTION INTRAMUSCULAR; INTRAVENOUS EVERY 6 HOURS PRN
Status: DISCONTINUED | OUTPATIENT
Start: 2018-12-03 | End: 2018-12-04 | Stop reason: HOSPADM

## 2018-12-03 RX ORDER — OXYCODONE HYDROCHLORIDE 5 MG/1
5-10 TABLET ORAL
Status: CANCELLED | OUTPATIENT
Start: 2018-12-03

## 2018-12-03 RX ORDER — PROPOFOL 10 MG/ML
INJECTION, EMULSION INTRAVENOUS PRN
Status: DISCONTINUED | OUTPATIENT
Start: 2018-12-03 | End: 2018-12-03

## 2018-12-03 RX ORDER — ONDANSETRON 2 MG/ML
4 INJECTION INTRAMUSCULAR; INTRAVENOUS EVERY 6 HOURS PRN
Status: DISCONTINUED | OUTPATIENT
Start: 2018-12-03 | End: 2018-12-03

## 2018-12-03 RX ORDER — ACETAMINOPHEN 325 MG/1
975 TABLET ORAL EVERY 8 HOURS
Status: DISCONTINUED | OUTPATIENT
Start: 2018-12-03 | End: 2018-12-04 | Stop reason: HOSPADM

## 2018-12-03 RX ORDER — IPRATROPIUM BROMIDE AND ALBUTEROL SULFATE 2.5; .5 MG/3ML; MG/3ML
3 SOLUTION RESPIRATORY (INHALATION) ONCE
Status: COMPLETED | OUTPATIENT
Start: 2018-12-03 | End: 2018-12-03

## 2018-12-03 RX ORDER — ONDANSETRON 4 MG/1
4 TABLET, ORALLY DISINTEGRATING ORAL EVERY 6 HOURS PRN
Status: DISCONTINUED | OUTPATIENT
Start: 2018-12-03 | End: 2018-12-04 | Stop reason: HOSPADM

## 2018-12-03 RX ORDER — ONDANSETRON 4 MG/1
4 TABLET, ORALLY DISINTEGRATING ORAL EVERY 6 HOURS PRN
Status: CANCELLED | OUTPATIENT
Start: 2018-12-03

## 2018-12-03 RX ORDER — METHOCARBAMOL 750 MG/1
750 TABLET, FILM COATED ORAL 4 TIMES DAILY PRN
Status: CANCELLED | OUTPATIENT
Start: 2018-12-03

## 2018-12-03 RX ORDER — ACETAMINOPHEN 325 MG/1
975 TABLET ORAL ONCE
Status: DISCONTINUED | OUTPATIENT
Start: 2018-12-03 | End: 2018-12-04 | Stop reason: HOSPADM

## 2018-12-03 RX ORDER — PROCHLORPERAZINE MALEATE 5 MG
10 TABLET ORAL EVERY 6 HOURS PRN
Status: DISCONTINUED | OUTPATIENT
Start: 2018-12-03 | End: 2018-12-04 | Stop reason: HOSPADM

## 2018-12-03 RX ORDER — FENTANYL CITRATE 50 UG/ML
25-50 INJECTION, SOLUTION INTRAMUSCULAR; INTRAVENOUS
Status: DISCONTINUED | OUTPATIENT
Start: 2018-12-03 | End: 2018-12-03

## 2018-12-03 RX ORDER — LISINOPRIL 10 MG/1
10 TABLET ORAL DAILY
Status: DISCONTINUED | OUTPATIENT
Start: 2018-12-03 | End: 2018-12-04 | Stop reason: HOSPADM

## 2018-12-03 RX ORDER — OXYCODONE HYDROCHLORIDE 5 MG/1
5 TABLET ORAL EVERY 4 HOURS PRN
Status: DISCONTINUED | OUTPATIENT
Start: 2018-12-03 | End: 2018-12-03

## 2018-12-03 RX ORDER — CEFAZOLIN SODIUM 1 G/50ML
1 SOLUTION INTRAVENOUS SEE ADMIN INSTRUCTIONS
Status: DISCONTINUED | OUTPATIENT
Start: 2018-12-03 | End: 2018-12-04 | Stop reason: HOSPADM

## 2018-12-03 RX ORDER — FENTANYL CITRATE 50 UG/ML
INJECTION, SOLUTION INTRAMUSCULAR; INTRAVENOUS PRN
Status: DISCONTINUED | OUTPATIENT
Start: 2018-12-03 | End: 2018-12-03

## 2018-12-03 RX ORDER — AMOXICILLIN 250 MG
1 CAPSULE ORAL 2 TIMES DAILY
Status: DISCONTINUED | OUTPATIENT
Start: 2018-12-03 | End: 2018-12-04 | Stop reason: HOSPADM

## 2018-12-03 RX ORDER — SODIUM CHLORIDE 9 MG/ML
INJECTION, SOLUTION INTRAVENOUS CONTINUOUS
Status: CANCELLED | OUTPATIENT
Start: 2018-12-03

## 2018-12-03 RX ORDER — ONDANSETRON 2 MG/ML
4 INJECTION INTRAMUSCULAR; INTRAVENOUS EVERY 6 HOURS PRN
Status: CANCELLED | OUTPATIENT
Start: 2018-12-03

## 2018-12-03 RX ORDER — LIDOCAINE HYDROCHLORIDE 20 MG/ML
INJECTION, SOLUTION INFILTRATION; PERINEURAL PRN
Status: DISCONTINUED | OUTPATIENT
Start: 2018-12-03 | End: 2018-12-03

## 2018-12-03 RX ORDER — METHOCARBAMOL 750 MG/1
750 TABLET, FILM COATED ORAL 4 TIMES DAILY PRN
Status: DISCONTINUED | OUTPATIENT
Start: 2018-12-03 | End: 2018-12-04 | Stop reason: HOSPADM

## 2018-12-03 RX ORDER — LIDOCAINE 40 MG/G
CREAM TOPICAL
Status: DISCONTINUED | OUTPATIENT
Start: 2018-12-03 | End: 2018-12-03

## 2018-12-03 RX ORDER — NALOXONE HYDROCHLORIDE 0.4 MG/ML
.1-.4 INJECTION, SOLUTION INTRAMUSCULAR; INTRAVENOUS; SUBCUTANEOUS
Status: DISCONTINUED | OUTPATIENT
Start: 2018-12-03 | End: 2018-12-03

## 2018-12-03 RX ORDER — GABAPENTIN 300 MG/1
300 CAPSULE ORAL 2 TIMES DAILY
Status: CANCELLED | OUTPATIENT
Start: 2018-12-03 | End: 2018-12-06

## 2018-12-03 RX ORDER — OXYCODONE HYDROCHLORIDE 5 MG/1
5 TABLET ORAL ONCE
Status: COMPLETED | OUTPATIENT
Start: 2018-12-03 | End: 2018-12-03

## 2018-12-03 RX ORDER — SODIUM CHLORIDE, SODIUM LACTATE, POTASSIUM CHLORIDE, CALCIUM CHLORIDE 600; 310; 30; 20 MG/100ML; MG/100ML; MG/100ML; MG/100ML
INJECTION, SOLUTION INTRAVENOUS CONTINUOUS PRN
Status: DISCONTINUED | OUTPATIENT
Start: 2018-12-03 | End: 2018-12-03

## 2018-12-03 RX ORDER — LABETALOL HYDROCHLORIDE 5 MG/ML
INJECTION, SOLUTION INTRAVENOUS PRN
Status: DISCONTINUED | OUTPATIENT
Start: 2018-12-03 | End: 2018-12-03

## 2018-12-03 RX ORDER — LIDOCAINE 40 MG/G
CREAM TOPICAL
Status: DISCONTINUED | OUTPATIENT
Start: 2018-12-03 | End: 2018-12-04 | Stop reason: HOSPADM

## 2018-12-03 RX ORDER — ACETAMINOPHEN 325 MG/1
975 TABLET ORAL EVERY 8 HOURS
Status: CANCELLED | OUTPATIENT
Start: 2018-12-03 | End: 2018-12-06

## 2018-12-03 RX ORDER — PROPOFOL 10 MG/ML
INJECTION, EMULSION INTRAVENOUS CONTINUOUS PRN
Status: DISCONTINUED | OUTPATIENT
Start: 2018-12-03 | End: 2018-12-03

## 2018-12-03 RX ORDER — LIDOCAINE 40 MG/G
CREAM TOPICAL
Status: CANCELLED | OUTPATIENT
Start: 2018-12-03

## 2018-12-03 RX ORDER — KETOROLAC TROMETHAMINE 30 MG/ML
INJECTION, SOLUTION INTRAMUSCULAR; INTRAVENOUS PRN
Status: DISCONTINUED | OUTPATIENT
Start: 2018-12-03 | End: 2018-12-03

## 2018-12-03 RX ORDER — ONDANSETRON 4 MG/1
4 TABLET, ORALLY DISINTEGRATING ORAL EVERY 6 HOURS PRN
Status: DISCONTINUED | OUTPATIENT
Start: 2018-12-03 | End: 2018-12-03

## 2018-12-03 RX ADMIN — ACETAMINOPHEN 975 MG: 325 TABLET, FILM COATED ORAL at 17:58

## 2018-12-03 RX ADMIN — FENTANYL CITRATE 50 MCG: 50 INJECTION, SOLUTION INTRAMUSCULAR; INTRAVENOUS at 10:38

## 2018-12-03 RX ADMIN — CEFAZOLIN SODIUM 1 G: 2 SOLUTION INTRAVENOUS at 12:40

## 2018-12-03 RX ADMIN — Medication 10 MG: at 11:34

## 2018-12-03 RX ADMIN — Medication 0.2 MG: at 11:48

## 2018-12-03 RX ADMIN — ALBUTEROL SULFATE 4 PUFF: 90 INHALANT RESPIRATORY (INHALATION) at 13:42

## 2018-12-03 RX ADMIN — Medication 20 MG: at 12:20

## 2018-12-03 RX ADMIN — SODIUM CHLORIDE, SODIUM LACTATE, POTASSIUM CHLORIDE, CALCIUM CHLORIDE: 600; 310; 30; 20 INJECTION, SOLUTION INTRAVENOUS at 08:55

## 2018-12-03 RX ADMIN — CEFAZOLIN SODIUM 2 G: 2 SOLUTION INTRAVENOUS at 10:54

## 2018-12-03 RX ADMIN — KETOROLAC TROMETHAMINE 30 MG: 30 INJECTION, SOLUTION INTRAMUSCULAR; INTRAVENOUS at 13:53

## 2018-12-03 RX ADMIN — PROPOFOL: 10 INJECTION, EMULSION INTRAVENOUS at 11:27

## 2018-12-03 RX ADMIN — SENNOSIDES AND DOCUSATE SODIUM 1 TABLET: 8.6; 5 TABLET ORAL at 20:12

## 2018-12-03 RX ADMIN — DEXAMETHASONE SODIUM PHOSPHATE 6 MG: 10 INJECTION, SOLUTION INTRAMUSCULAR; INTRAVENOUS at 12:06

## 2018-12-03 RX ADMIN — DEXAMETHASONE SODIUM PHOSPHATE 4 MG: 10 INJECTION, SOLUTION INTRAMUSCULAR; INTRAVENOUS at 10:56

## 2018-12-03 RX ADMIN — PROPOFOL 90 MCG/KG/MIN: 10 INJECTION, EMULSION INTRAVENOUS at 12:07

## 2018-12-03 RX ADMIN — LABETALOL HYDROCHLORIDE 15 MG: 5 INJECTION, SOLUTION INTRAVENOUS at 10:49

## 2018-12-03 RX ADMIN — FENTANYL CITRATE 50 MCG: 50 INJECTION, SOLUTION INTRAMUSCULAR; INTRAVENOUS at 10:47

## 2018-12-03 RX ADMIN — Medication 0.3 MG: at 11:00

## 2018-12-03 RX ADMIN — IPRATROPIUM BROMIDE AND ALBUTEROL SULFATE 3 ML: 2.5; .5 SOLUTION RESPIRATORY (INHALATION) at 14:43

## 2018-12-03 RX ADMIN — OXYCODONE HYDROCHLORIDE 10 MG: 5 TABLET ORAL at 20:12

## 2018-12-03 RX ADMIN — OXYCODONE HYDROCHLORIDE 10 MG: 5 TABLET ORAL at 23:06

## 2018-12-03 RX ADMIN — OXYCODONE HYDROCHLORIDE 5 MG: 5 TABLET ORAL at 15:10

## 2018-12-03 RX ADMIN — Medication 35 MG: at 10:42

## 2018-12-03 RX ADMIN — PROPOFOL 150 MCG/KG/MIN: 10 INJECTION, EMULSION INTRAVENOUS at 10:36

## 2018-12-03 RX ADMIN — Medication 15 MG: at 11:00

## 2018-12-03 RX ADMIN — FENTANYL CITRATE 25 MCG: 50 INJECTION, SOLUTION INTRAMUSCULAR; INTRAVENOUS at 15:14

## 2018-12-03 RX ADMIN — PROPOFOL 40 MG: 10 INJECTION, EMULSION INTRAVENOUS at 12:17

## 2018-12-03 RX ADMIN — LIDOCAINE HYDROCHLORIDE 100 MG: 20 INJECTION, SOLUTION INFILTRATION; PERINEURAL at 10:41

## 2018-12-03 RX ADMIN — ONDANSETRON 4 MG: 2 INJECTION INTRAMUSCULAR; INTRAVENOUS at 10:56

## 2018-12-03 RX ADMIN — FENTANYL CITRATE 25 MCG: 50 INJECTION, SOLUTION INTRAMUSCULAR; INTRAVENOUS at 15:12

## 2018-12-03 RX ADMIN — LISINOPRIL 10 MG: 10 TABLET ORAL at 17:58

## 2018-12-03 RX ADMIN — SODIUM CHLORIDE, SODIUM LACTATE, POTASSIUM CHLORIDE, CALCIUM CHLORIDE: 600; 310; 30; 20 INJECTION, SOLUTION INTRAVENOUS at 11:56

## 2018-12-03 RX ADMIN — ALBUTEROL SULFATE 6 PUFF: 90 INHALANT RESPIRATORY (INHALATION) at 11:42

## 2018-12-03 RX ADMIN — SCOLOPAMINE TRANSDERMAL SYSTEM 1 PATCH: 1 PATCH, EXTENDED RELEASE TRANSDERMAL at 08:41

## 2018-12-03 RX ADMIN — PROPOFOL 220 MG: 10 INJECTION, EMULSION INTRAVENOUS at 10:41

## 2018-12-03 ASSESSMENT — PAIN DESCRIPTION - DESCRIPTORS
DESCRIPTORS: SORE

## 2018-12-03 NOTE — DISCHARGE INSTRUCTIONS
[facility]  Home Care Following Anesthesia  For 24 hours after surgery:  1. Get plenty of rest.  A responsible adult must stay with you for at least 24 hours after you leave the surgery center.  2. Do not drive or use heavy equipment.  If you have weakness or tingling, don't drive or use heavy equipment until this feeling goes away.   3. Do not drink alcohol.   4. Avoid strenuous or risky activities.  Ask for help when climbing stairs.  5. You may feel lightheaded.  IF so, sit for a few minutes before standing.  Have someone help you get up.   6. If you have nausea (feel sick to your stomach): Drink only clear liquids such as apple juice, ginger ale, broth or 7-Up.  Rest may also help.  Be sure to drink enough fluids.  Move to a regular diet as you feel able.   7. You may have a slight fever.  Call the doctor if your fever is over 100 F (37.7 C) (taken under the tongue) or lasts longer than 24 hours.  8. You may have a dry mouth, a sore throat, muscle aches or trouble sleeping. These should go away after 24 hours.  9. Do not make important or legal decisions.     Tips for taking pain medications  To get the best pain relief possible, remember these points:    Take pain medications as directed, before pain becomes severe.    Pain medication can upset your stomach: taking it with food may help.    Constipation is a common side effect of pain medication. Drink plenty of  fluids.    Eat foods high in fiber. Take a stool softener if recommended by your doctor or pharmacist.    Do not drink alcohol, drive or operate machinery while taking pain medications.    Ask about other ways to control pain, such as with heat, ice or relaxation.    Tylenol/Acetaminophen Consumption  To help encourage the safe use of acetaminophen, the makers of TYLENOL  have lowered the maximum daily dose for single-ingredient Extra Strength TYLENOL  (acetaminophen) products sold in the [place] from 8 pills per day  (4,000 mg) to 6 pills per day (3,000 mg). The dosing interval has also changed from 2 pills every 4-6 hours to 2 pills every 6 hours.    If you feel your pain relief is insufficient, you may take Tylenol/Acetaminophen in addition to your narcotic pain medication.     Be careful not to exceed 3,000 mg of Tylenol/Acetaminophen in a 24 hour period from all sources.    If you are taking extra strength Tylenol/acetaminophen (500 mg), the maximum dose is 6 tablets in 24 hours.    If you are taking regular strength acetaminophen (325 mg), the maximum dose is 9 tablets in 24 hours.    Oxycodone 5mg given at 3:10pm    Call a doctor for any of the followin. Signs of infection (fever, growing tenderness at the surgery site, a large amount of drainage or bleeding, severe pain, foul-smelling drainage, redness, swelling).  2. It has been over 8 to 10 hours since surgery and you are still not able to urinate (pass water).  3. Headache for over 24 hours.      Your doctor is:  Dr Alexa Enriquez [phone]               Or dial [phone] and ask for the resident on call for:  General Surgery  For emergency care, call the:  [facility]:  [phone] (TTY for hearing impaired: [phone])      After Your Open Inguinal Hernia Repair         Incision care     You may take a shower the day after surgery. Carefully wash your incision with soap and water. Do not submerge yourself in water (bath, whirlpool, hot tub, pool, lake) for 14 days after surgery.     Remove the bandage the day after surgery, but leave the medical tape (Steri-Strips) or glue in place. These will loosen and fall off on their own 5 to 7 days after surgery.      Always wash your hands before touching your incisions or removing bandages.     It is not unusual to form a collection of fluid or blood under your incision that may feel firm or squishy- it can take several weeks to months for your body to reabsorb it.  At times, it may even drain.   If that should happen keep the area clean with soap, water,  and cover with a clean gauze dressing. You can change this daily or as needed.     Other medicines     Wait to start aspirin or blood thinners until the day after surgery. You can take any other regular medicines at your normal time the day after surgery.     Your pain medicine may cause constipation (hard, dry stools). To help with this, take the stool softener your doctor gave you or an over-the-counter stool softener or laxative. You can stop taking this when you are no longer taking pain medicine and your bowel movements are back to normal.      For pain or discomfort     Take the narcotic pain medicine your doctor gave you as needed and as instructed on the bottle. If you prefer to use over-the-counter medication, use acetaminophen (Tylenol) or ibuprofen (Advil, Motrin) as instructed on the box. Do not take Tylenol if it is in your narcotic pain medication.      Use an ice pack on your groin for 20 minutes at a time as needed for the first 24 hours. Be sure to protect your skin by putting a cloth between the ice pack and your skin.     After 24 hours you can switch to heat for 20 minutes as needed. Be sure to protect your skin by putting a cloth between the heat pack and your skin.      It is normal to feel a lump in your groin after surgery. This lump may take up to 8 weeks to go away.      Activities     No driving until you feel it s safe to do so. Don t drive while taking narcotic pain medicine.     Don t lift anything heavier than 20 pounds for 3 weeks after surgery.      Special equipment     If we gave you an athletic supporter, wear this for the first 3 days after surgery. You can wear it longer than this if you wish.     If you left the hospital in VERNON socks (compression stockings), you may take them off the day after surgery.      Diet   You can eat your regular meals after surgery.      When to call the doctor   Call your doctor if you  have:     A fever above 101 F (38.3 C) (taken under the tongue), or a fever or chills lasting more than a day.     Redness at the incision site.     Any fluid or blood draining from the incision, especially if it smells bad.      Severe pain that doesn t improve with pain medicine.      We will call you 2 to 4 days after surgery to review this handout, answer questions and help arrange after-surgery care. If you have questions or concerns, please call [phone] during regular office hours. If you need to call after business hours, call [phone] and ask to page the surgeon on-call.

## 2018-12-03 NOTE — IP AVS SNAPSHOT
MRN:[id number]                      After Visit Summary   12/3/2018    Yan Young    MRN: [id number]           Thank you!     Thank you for choosing [facility] for your care. Our goal is always to provide you with excellent care. Hearing back from our patients is one way we can continue to improve our services. Please take a few minutes to complete the written survey that you may receive in the mail after you visit with us. Thank you!        Patient Information     Date Of Birth          1965        Designated Caregiver       Most Recent Value    Caregiver    Will someone help with your care after discharge? no      About your hospital stay     You were admitted on:  December 3, 2018 You last received care in the:  Unit 6D Observation [facility]    You were discharged on:  December 4, 2018        Reason for your hospital stay       Post-op extubation difficulty                  Who to Call     For medical emergencies, please call 911.  For non-urgent questions about your medical care, please call your primary care provider or clinic, [phone]          Attending Provider     Provider Specialty    Vasiliy Sanders MD General Surgery       Primary Care Provider Office Phone # Fax #    Lenny Kalpesh Parson [phone]      After Care Instructions     Activity       Your activity upon discharge: activity as tolerated, no heavy lifting of more than 10 lbs for next 4-6 weeks and until cleared by            Diet       Follow this diet upon discharge: Regular diet                  Follow-up Appointments     Adult [facility] Follow-up and recommended labs and tests       Discharge Instructions  Activity  Activity as tolerated, no heavy lifting of more than 10 lbs for 4-6 weeks and cleared by surgeon.    Incisions  Laparoscopy sites have dermabond and self-absorbing sutures.    Medications  - Do not take any additional Tylenol (acetaminophen) while using a narcotic  pain medication which includes acetaminophen  - Do not take more than 4,000mg of acetaminophen in any 24 hour period, as this can cause liver damage  - Take stool softeners such as Senna or Miralax while you are using narcotics, but stop if you develop diarrhea  - Wean yourself off of narcotic pain medications    Follow-Up:  - Call your primary care provider to touch base regarding your recent admission.     - Call or return sooner than your regularly scheduled visit if you develop any of the following: fever >101.5, uncontrolled pain, uncontrolled nausea or vomiting, as well as increased redness, swelling, or drainage from your wound.   -  A nurse from the General Surgery Clinic will contact you 24 hours, or next business day, after your discharge from the hospital.  If you have questions or to schedule a follow up appointment please call the General Surgery Clinic at [phone].  Call [phone] and ask to speak with the Surgery resident on call if you are having troubles in the evenings, at night, or on the weekend.  -  If you are receiving home care please inform your home care nurse of our contact number.                  Pending Results     Date and Time Order Name Status Description    12/4/2018 0816 CT Chest Pulmonary Embolism w Contrast In process     12/3/2018 1247 Surgical pathology exam In process             Statement of Approval     Ordered          12/04/18 1431  I have reviewed and agree with all the recommendations and orders detailed in this document.  EFFECTIVE NOW     Approved and electronically signed by:  Hair Khan MD             Admission Information     Date & Time Provider Department Dept. Phone    12/3/2018 Vasiliy Sanders MD Unit 6D Observation [facility] [phone]      Your Vitals Were     Blood Pressure Temperature Respirations Pulse Oximetry          152/81 (BP Location: Left arm) 98.8  F (37.1  C) (Oral) 18 91%        MyChart Information     MyChart gives you  secure access to your electronic health record. If you see a primary care provider, you can also send messages to your care team and make appointments. If you have questions, please call your primary care clinic.  If you do not have a primary care provider, please call [phone] and they will assist you.        Care EveryWhere ID     This is your Care EveryWhere ID. This could be used by other organizations to access your [place] medical records  [id number]        Equal Access to Services     SOLOMON ZUNIGA : William gonzalezo Soroselia, waaxda luqadaha, qaybta kaalmada lana, daniel castillo. So [facility] [phone].    ATENCIÓN: Si wendyla beni, tiene a maher disposición servicios gratuitos de asistencia lingüística. Llame al [phone].    We comply with applicable federal civil rights laws and [place] laws. We do not discriminate on the basis of race, color, national origin, age, disability, sex, sexual orientation, or gender identity.               Review of your medicines      START taking        Dose / Directions    acetaminophen 325 MG tablet   Commonly known as:  TYLENOL        Dose:  650 mg   Start taking on:  12/6/2018   Take 2 tablets (650 mg) by mouth every 4 hours as needed for other or pain (multimodal surgical pain management along with NSAIDS and opioid medication as indicated based on pain control and physical function.)   Quantity:  100 tablet   Refills:  1       oxyCODONE 5 MG tablet   Commonly known as:  ROXICODONE        Dose:  5 mg   Take 1 tablet (5 mg) by mouth every 6 hours as needed for breakthrough pain (Take one tablet as needed every 6 hours for breakthrough pain if pain poorly controlled with tylenol alone)   Quantity:  15 tablet   Refills:  0       senna-docusate 8.6-50 MG tablet   Commonly known as:  SENOKOT-S/PERICOLACE        Dose:  1 tablet   Take 1 tablet by mouth 2 times daily as needed for constipation (Please take for stool softening regimen  while on oxycodone)   Quantity:  100 tablet   Refills:  0         CONTINUE these medicines which have NOT CHANGED        Dose / Directions    lisinopril 10 MG tablet   Commonly known as:  PRINIVIL/ZESTRIL   Used for:  Essential hypertension with goal blood pressure less than 140/90        TAKE 1 TABLET BY MOUTH EVERY DAY   Quantity:  30 tablet   Refills:  0            Where to get your medicines      These medications were sent to [facility] [place], [place] - [address]  [address] [address]     Phone:  [phone]     senna-docusate 8.6-50 MG tablet         Some of these will need a paper prescription and others can be bought over the counter. Ask your nurse if you have questions.     Bring a paper prescription for each of these medications     acetaminophen 325 MG tablet    oxyCODONE 5 MG tablet                Protect others around you: Learn how to safely use, store and throw away your medicines at www.disposemymeds.org.        Information about OPIOIDS     PRESCRIPTION OPIOIDS: WHAT YOU NEED TO KNOW   We gave you an opioid (narcotic) pain medicine. It is important to manage your pain, but opioids are not always the best choice. You should first try all the other options your care team gave you. Take this medicine for as short a time (and as few doses) as possible.    Some activities can increase your pain, such as bandage changes or therapy sessions. It may help to take your pain medicine 30 to 60 minutes before these activities. Reduce your stress by getting enough sleep, working on hobbies you enjoy and practicing relaxation or meditation. Talk to your care team about ways to manage your pain beyond prescription opioids.    These medicines have risks:    DO NOT drive when on new or higher doses of pain medicine. These medicines can affect your alertness and reaction times, and you could be arrested for driving under the influence (DUI). If you need to use  opioids long-term, talk to your care team about driving.    DO NOT operate heavy machinery    DO NOT do any other dangerous activities while taking these medicines.    DO NOT drink any alcohol while taking these medicines.     If the opioid prescribed includes acetaminophen, DO NOT take with any other medicines that contain acetaminophen. Read all labels carefully. Look for the word  acetaminophen  or  Tylenol.  Ask your pharmacist if you have questions or are unsure.    You can get addicted to pain medicines, especially if you have a history of addiction (chemical, alcohol or substance dependence). Talk to your care team about ways to reduce this risk.    All opioids tend to cause constipation. Drink plenty of water and eat foods that have a lot of fiber, such as fruits, vegetables, prune juice, apple juice and high-fiber cereal. Take a laxative (Miralax, milk of magnesia, Colace, Senna) if you don t move your bowels at least every other day. Other side effects include upset stomach, sleepiness, dizziness, throwing up, tolerance (needing more of the medicine to have the same effect), physical dependence and slowed breathing.    Store your pills in a secure place, locked if possible. We will not replace any lost or stolen medicine. If you don t finish your medicine, please throw away (dispose) as directed by your pharmacist. The [place] Pollution Control Agency has more information about safe disposal: https://www.pca.[facility].mn.us/living-green/managing-unwanted-medications             Medication List: This is a list of all your medications and when to take them. Check marks below indicate your daily home schedule. Keep this list as a reference.      Medications           Morning Afternoon Evening Bedtime As Needed    acetaminophen 325 MG tablet   Commonly known as:  TYLENOL   Take 2 tablets (650 mg) by mouth every 4 hours as needed for other or pain (multimodal surgical pain management along with NSAIDS and opioid  medication as indicated based on pain control and physical function.)   Start taking on:  12/6/2018   Last time this was given:  975 mg on 12/4/2018  9:34 AM                                lisinopril 10 MG tablet   Commonly known as:  PRINIVIL/ZESTRIL   TAKE 1 TABLET BY MOUTH EVERY DAY   Last time this was given:  10 mg on 12/4/2018  9:35 AM                                oxyCODONE 5 MG tablet   Commonly known as:  ROXICODONE   Take 1 tablet (5 mg) by mouth every 6 hours as needed for breakthrough pain (Take one tablet as needed every 6 hours for breakthrough pain if pain poorly controlled with tylenol alone)   Last time this was given:  10 mg on 12/4/2018  6:13 AM                                senna-docusate 8.6-50 MG tablet   Commonly known as:  SENOKOT-S/PERICOLACE   Take 1 tablet by mouth 2 times daily as needed for constipation (Please take for stool softening regimen while on oxycodone)   Last time this was given:  1 tablet on 12/4/2018  9:35 AM

## 2018-12-03 NOTE — BRIEF OP NOTE
Brief Op Note    Pre op diagnosis:  Right inguinal hernia  Post op diagnosis:  Indirect right inguinal hernia  Procedure:  Laparoscopic converted to open right inguinal hernia repair with lightweight polypropylene mesh  Surgeon:  Alexa Enriquez MD  Anesthesia:  GETA + 20 ml local (0.25% bupivicaine plain)  EBL:  50 CC  Specimens:  Hernia sac, ilioinguinal nerve  Drains:  None  Complications:  Hypoxia/ respiratory distress during the procedure precipitating conversion to open right inguinal hernia repair and reintubation intraoperatively.      Alexa Enriquez

## 2018-12-03 NOTE — PROGRESS NOTES
Pt extubated and slowly awakening in PACU.  However, given respiratory distress during procedure and slow recovery from anesthesia, I believe it would be safest to admit the patient overnight for observation.    I discussed this with the patient's wife, Katerina, and son Adrien, who agree.    Dr Sanders, who is covering ACS at [facility], has also been informed and will be the admitting M.D.    Alexa Enriquez  3:27 PM

## 2018-12-03 NOTE — IP AVS SNAPSHOT
Unit 6D Observation [address] [address]    Phone:  [phone]    Fax:  [phone]                                       After Visit Summary   12/3/2018    Yan Young    MRN: [id number]           After Visit Summary Signature Page     I have received my discharge instructions, and my questions have been answered. I have discussed any challenges I see with this plan with the nurse or doctor.    ..........................................................................................................................................  Patient/Patient Representative Signature      ..........................................................................................................................................  Patient Representative Print Name and Relationship to Patient    ..................................................               ................................................  Date                                   Time    ..........................................................................................................................................  Reviewed by Signature/Title    ...................................................              ..............................................  Date                                               Time          22EPIC Rev 08/18

## 2018-12-03 NOTE — PROGRESS NOTES
[place] [facility] [place]

## 2018-12-03 NOTE — ANESTHESIA PREPROCEDURE EVALUATION
Anesthesia Pre-Procedure Evaluation    Patient: Yan Young   MRN:     [id number] Gender:   male   Age:    53 year old :      1965        Preoperative Diagnosis: Inguinal hernia   Procedure(s):  Laparoscopic Right Inguinal Hernia Repair with Mesh     Past Medical History:   Diagnosis Date     Hypertension       History reviewed. No pertinent surgical history.       Anesthesia Evaluation     .             ROS/MED HX    ENT/Pulmonary:  - neg pulmonary ROS     Neurologic:  - neg neurologic ROS     Cardiovascular:  - neg cardiovascular ROS   (+) hypertension----. : . . . :. .       METS/Exercise Tolerance:     Hematologic:  - neg hematologic  ROS       Musculoskeletal:  - neg musculoskeletal ROS       GI/Hepatic:  - neg GI/hepatic ROS       Renal/Genitourinary:  - ROS Renal section negative       Endo:  - neg endo ROS       Psychiatric:  - neg psychiatric ROS       Infectious Disease:  - neg infectious disease ROS       Malignancy:      - no malignancy   Other:    - neg other ROS                     PHYSICAL EXAM:   Mental Status/Neuro: A/A/O   Airway: Facies: Feasible  Mallampati: I  Mouth/Opening: Full  TM distance: > 6 cm  Neck ROM: Full   Respiratory: Auscultation: CTAB     Resp. Rate: Normal     Resp. Effort: Normal      CV: Rhythm: Regular  Rate: Age appropriate  Heart: Normal Sounds   Comments:      Dental: Normal                  Lab Results   Component Value Date    WBC 7.3 2015    HGB 16.3 2015    HCT 46.7 2015     2015     2018    POTASSIUM 3.9 2018    CHLORIDE 104 2018    CO2 24 2018    BUN 15 2018    CR 0.97 2018     (H) 2018    KATIE 9.4 2018    ALBUMIN 4.3 2018    PROTTOTAL 8.1 2018    ALT 70 2018    AST 35 2018    ALKPHOS 69 2018    BILITOTAL 1.0 2018       Preop Vitals  BP Readings from Last 3 Encounters:   18 (!) 145/95   18 (!) 165/130   10/18/18 (!)  "173/94    Pulse Readings from Last 3 Encounters:   12/03/18 92   11/12/18 115   10/18/18 86      Resp Readings from Last 3 Encounters:   12/03/18 16   10/18/18 16   09/25/18 16    SpO2 Readings from Last 3 Encounters:   12/03/18 93%   11/12/18 95%   10/18/18 95%      Temp Readings from Last 1 Encounters:   12/03/18 36.7  C (98.1  F) (Oral)    Ht Readings from Last 1 Encounters:   12/03/18 1.6 m (5' 3\")      Wt Readings from Last 1 Encounters:   12/03/18 70.3 kg (155 lb)    Estimated body mass index is 27.46 kg/(m^2) as calculated from the following:    Height as of this encounter: 1.6 m (5' 3\").    Weight as of this encounter: 70.3 kg (155 lb).     LDA:  Peripheral IV 12/03/18 Left (Active)   Site Assessment WDL 12/3/2018  8:23 AM   Line Status Infusing 12/3/2018  8:23 AM   Phlebitis Scale 0-->no symptoms 12/3/2018  8:23 AM   Infiltration Scale 0 12/3/2018  8:23 AM   Extravasation? No 12/3/2018  8:23 AM   Dressing Intervention New dressing  12/3/2018  8:23 AM   Number of days:0       ETT (adult) (Active)   Number of days:0            Assessment:   ASA SCORE: 1    NPO Status: > 6 hours since completed Solid Foods   Documentation: H&P complete; Preop Testing complete; Consents complete   Proceeding: Proceed without further delay  Tobacco Use:  NO Active use of Tobacco/UNKNOWN Tobacco use status     Plan:   Anes. Type:  General   Pre-Induction: Midazolam IV; Acetaminophen PO; Gabapentin PO   Induction:  IV (Standard)   Airway: Oral ETT   Access/Monitoring: PIV   Maintenance: Balanced   Emergence: Procedure Site   Logistics: Same Day Surgery     Postop Pain/Sedation Strategy:  Standard-Options: Opioids PRN     PONV Management:  Adult Risk Factors:, Non-Smoker, Postop Opioids  Prevention: Ondansetron; Dexamethasone; Scop. Patch     CONSENT: Direct conversation   Plan and risks discussed with: Patient   Blood Products: Consent Deferred (Minimal Blood Loss)                         Andi Mckinnon MD  "

## 2018-12-03 NOTE — ANESTHESIA CARE TRANSFER NOTE
Patient: Yan Hannah    Procedure(s):  Laparoscopic Convert to Open Right Inguinal Hernia Repair with Mesh    Diagnosis: Inguinal hernia  Diagnosis Additional Information: No value filed.    Anesthesia Type:   No value filed.     Note:  Airway :Face Mask  Patient transferred to:PACU  Comments: Uneventful transport to PACU; VSS; Report given to RN; Pt comfortable; IV patent: pt exchanging wellHandoff Report: Identifed the Patient, Identified the Reponsible Provider, Reviewed the pertinent medical history, Discussed the surgical course, Reviewed Intra-OP anesthesia mangement and issues during anesthesia, Set expectations for post-procedure period and Allowed opportunity for questions and acknowledgement of understanding      Vitals: (Last set prior to Anesthesia Care Transfer)    CRNA VITALS  12/3/2018 1407 - 12/3/2018 1446      12/3/2018             NIBP: 137/72    SpO2: 96 %                Electronically Signed By: JACKLYN Jones CRNA  December 3, 2018  2:46 PM

## 2018-12-04 ENCOUNTER — APPOINTMENT (OUTPATIENT)
Dept: GENERAL RADIOLOGY | Facility: CLINIC | Age: 53
End: 2018-12-04
Attending: SURGERY
Payer: COMMERCIAL

## 2018-12-04 ENCOUNTER — APPOINTMENT (OUTPATIENT)
Dept: CT IMAGING | Facility: CLINIC | Age: 53
End: 2018-12-04
Attending: SURGERY
Payer: COMMERCIAL

## 2018-12-04 ENCOUNTER — APPOINTMENT (OUTPATIENT)
Dept: ULTRASOUND IMAGING | Facility: CLINIC | Age: 53
End: 2018-12-04
Attending: SURGERY
Payer: COMMERCIAL

## 2018-12-04 VITALS
SYSTOLIC BLOOD PRESSURE: 146 MMHG | OXYGEN SATURATION: 92 % | TEMPERATURE: 98.8 F | DIASTOLIC BLOOD PRESSURE: 85 MMHG | RESPIRATION RATE: 18 BRPM

## 2018-12-04 LAB
ANION GAP SERPL CALCULATED.3IONS-SCNC: 10 MMOL/L (ref 3–14)
BUN SERPL-MCNC: 17 MG/DL (ref 7–30)
CALCIUM SERPL-MCNC: 8.8 MG/DL (ref 8.5–10.1)
CHLORIDE SERPL-SCNC: 106 MMOL/L (ref 94–109)
CO2 SERPL-SCNC: 24 MMOL/L (ref 20–32)
CREAT SERPL-MCNC: 1 MG/DL (ref 0.66–1.25)
ERYTHROCYTE [DISTWIDTH] IN BLOOD BY AUTOMATED COUNT: 12.4 % (ref 10–15)
GFR SERPL CREATININE-BSD FRML MDRD: 78 ML/MIN/1.7M2
GLUCOSE BLDC GLUCOMTR-MCNC: 140 MG/DL (ref 70–99)
GLUCOSE SERPL-MCNC: 136 MG/DL (ref 70–99)
HCT VFR BLD AUTO: 41 % (ref 40–53)
HGB BLD-MCNC: 13.7 G/DL (ref 13.3–17.7)
MCH RBC QN AUTO: 31.6 PG (ref 26.5–33)
MCHC RBC AUTO-ENTMCNC: 33.4 G/DL (ref 31.5–36.5)
MCV RBC AUTO: 95 FL (ref 78–100)
PLATELET # BLD AUTO: 138 10E9/L (ref 150–450)
POTASSIUM SERPL-SCNC: 3.9 MMOL/L (ref 3.4–5.3)
RADIOLOGIST FLAGS: ABNORMAL
RADIOLOGIST FLAGS: ABNORMAL
RBC # BLD AUTO: 4.34 10E12/L (ref 4.4–5.9)
SODIUM SERPL-SCNC: 139 MMOL/L (ref 133–144)
WBC # BLD AUTO: 12.9 10E9/L (ref 4–11)

## 2018-12-04 PROCEDURE — 80048 BASIC METABOLIC PNL TOTAL CA: CPT | Performed by: STUDENT IN AN ORGANIZED HEALTH CARE EDUCATION/TRAINING PROGRAM

## 2018-12-04 PROCEDURE — 85027 COMPLETE CBC AUTOMATED: CPT | Performed by: STUDENT IN AN ORGANIZED HEALTH CARE EDUCATION/TRAINING PROGRAM

## 2018-12-04 PROCEDURE — 25000128 H RX IP 250 OP 636: Performed by: STUDENT IN AN ORGANIZED HEALTH CARE EDUCATION/TRAINING PROGRAM

## 2018-12-04 PROCEDURE — 93970 EXTREMITY STUDY: CPT

## 2018-12-04 PROCEDURE — 71260 CT THORAX DX C+: CPT

## 2018-12-04 PROCEDURE — 25000132 ZZH RX MED GY IP 250 OP 250 PS 637: Performed by: SURGERY

## 2018-12-04 PROCEDURE — 71046 X-RAY EXAM CHEST 2 VIEWS: CPT

## 2018-12-04 PROCEDURE — 00000146 ZZHCL STATISTIC GLUCOSE BY METER IP

## 2018-12-04 PROCEDURE — G0378 HOSPITAL OBSERVATION PER HR: HCPCS

## 2018-12-04 PROCEDURE — 36415 COLL VENOUS BLD VENIPUNCTURE: CPT | Performed by: STUDENT IN AN ORGANIZED HEALTH CARE EDUCATION/TRAINING PROGRAM

## 2018-12-04 RX ORDER — OXYCODONE HYDROCHLORIDE 5 MG/1
5 TABLET ORAL EVERY 6 HOURS PRN
Qty: 15 TABLET | Refills: 0 | Status: SHIPPED | OUTPATIENT
Start: 2018-12-04 | End: 2019-10-01

## 2018-12-04 RX ORDER — ACETAMINOPHEN 325 MG/1
650 TABLET ORAL EVERY 4 HOURS PRN
Qty: 100 TABLET | Refills: 1 | Status: SHIPPED | OUTPATIENT
Start: 2018-12-06 | End: 2019-10-01

## 2018-12-04 RX ORDER — IOPAMIDOL 755 MG/ML
56 INJECTION, SOLUTION INTRAVASCULAR ONCE
Status: COMPLETED | OUTPATIENT
Start: 2018-12-04 | End: 2018-12-04

## 2018-12-04 RX ORDER — AMOXICILLIN 250 MG
1 CAPSULE ORAL 2 TIMES DAILY PRN
Qty: 100 TABLET | Refills: 0 | Status: SHIPPED | OUTPATIENT
Start: 2018-12-04 | End: 2019-10-01

## 2018-12-04 RX ADMIN — OXYCODONE HYDROCHLORIDE 10 MG: 5 TABLET ORAL at 02:53

## 2018-12-04 RX ADMIN — IOPAMIDOL 56 ML: 755 INJECTION, SOLUTION INTRAVENOUS at 10:26

## 2018-12-04 RX ADMIN — SENNOSIDES AND DOCUSATE SODIUM 1 TABLET: 8.6; 5 TABLET ORAL at 09:35

## 2018-12-04 RX ADMIN — ACETAMINOPHEN 975 MG: 325 TABLET, FILM COATED ORAL at 01:31

## 2018-12-04 RX ADMIN — ACETAMINOPHEN 975 MG: 325 TABLET, FILM COATED ORAL at 09:34

## 2018-12-04 RX ADMIN — OXYCODONE HYDROCHLORIDE 10 MG: 5 TABLET ORAL at 06:13

## 2018-12-04 RX ADMIN — LISINOPRIL 10 MG: 10 TABLET ORAL at 09:35

## 2018-12-04 NOTE — PROGRESS NOTES
General Surgery Progress Note  December 4, 2018    S: Pt feeling well. Pain controlled. No shortness of breath. Has been on 5 L NC to maintain saturations >90%. Ambulating. Good UOP.      O:  /72 (BP Location: Left arm)  Temp 98.6  F (37  C) (Oral)  Resp 16  SpO2 96%    Gen: NAD, lying comfortably in bed  Pulm: NLB on RA  CV: RRR  Abdomen: soft, appropriately tender to palpation on POD 1. Non-peritonitic.   Extremities: warm and well-perfused, without cyanosis or edema    Labs: Reviewed   BMP WNL   WBC 12.9  Hgb 13.7    A/P: 52 yo M without significant medical history, without COPD, and no smoking history who is POD#1 from laparoscopic converted to open right inguinal hernia repair with lightweight polypropylene mesh who was intended to discharge to home on POD#0 but was kept overnight secondary to prolonged extubation. He was on 5L NC overnight and tolerating 2 L NC this morning on rounds.     Plan: Will order CT study for pulmonary embolus as well as BLE US for evaluation of DVT. If these are negative, patient is okay to discharge today.     Neuro: cont pain control with tylenol, PRN oxycodone   CV: No acute concerns  Pulm: CTAB, no respiratory distress, no increased work of breathing, on 2 L NC   FEN/GI: Regular diet  : No acute concerns  Heme/ID: No acute concerns  Activity: up ad delfino  PPx: SCDs  Dispo: 6D     Patient seen with chief resident and plan discussed with staff    Nikkie Herring MD   Surgery PGY-1

## 2018-12-04 NOTE — OP NOTE
Procedure Date: 12/03/2018      PREOPERATIVE DIAGNOSIS:  Right inguinal hernia.      POSTOPERATIVE DIAGNOSIS:  Indirect right inguinal hernia.      PROCEDURE:  Laparoscopic converted to open right inguinal hernia repair with lightweight polypropylene mesh.      SURGEON:  Alexa Enriquez MD      ANESTHESIA:  General endotracheal plus 20 mL of local in the form of 0.25% bupivacaine.      ESTIMATED BLOOD LOSS:  50 mL      INTRAVENOUS FLUIDS:  Please see anesthesia record.      SPECIMENS:  Hernia sac and a lipoma as well as ilioinguinal nerve.      DRAINS:  None.      COMPLICATIONS:  Hypoxia and respiratory distress during this procedure, which precipitated conversion to an open right inguinal hernia repair and reintubation intraoperatively.      INDICATIONS FOR PROCEDURE:  Yan Young is a 53-year-old male who presented to his Primary Care physician earlier this year with complaints of pain in the right groin region.  An ultrasound was done, which showed a right inguinal hernia, and he was referred to surgery for operative repair.  The risks and benefits of the procedure, including bleeding, infection, conversion to an open procedure and a recurrent hernia, were explained to the patient before obtaining informed consent.      OPERATIVE COURSE:  The patient was brought into the operating room and placed on the table in the supine position.  After adequate analgesia was administered, he was prepped and draped in the usual sterile fashion.  A time-out was performed to ensure proper side, site, patient and procedure.      Using a #15 blade, a vertical incision was made just below the umbilicus approximately 1.5 cm in length.  This incision was carried through the subcutaneous tissues with sharp dissection.  The anterior rectus sheath was identified and divided with the scalpel blade.  The rectus muscle was then bluntly spread until the posterior rectus sheath could be seen.  Blunt finger dissection was used to sweep the  rectus muscle away from the posterior rectus sheath before inserting a balloon-tipped trocar.  The dissecting balloon was placed through the trocar, and under direct visualization, the balloon was inflated.  The wound was then desufflated, and the preperitoneal space was insufflated with carbon dioxide gas.  Some of the loose areolar tissue behind the rectus was then bluntly dissected using the camera.  Under direct visualization, 2 additional 5 mm ports were placed in the midline, one approximately 5 cm below the umbilicus and the second approximately 3 cm inferior to that.  After both ports were placed, I began dissecting  the extraperitoneal space.  The pubic bone and Coopers ligament were clearly identified.  There was no direct hernia observed.  I then began to dissect laterally.  However, during the lateral dissection, the patient developed respiratory distress, and at this point we paused the surgical portion of the procedure.  The insufflation was stopped, and the abdomen was de-sufflated as anesthesia worked to improved the patients ventilation.  Over the next several minutes, O2 saturations remained in the low 80s and dropped even further into the 70s-60s.  At this point, Anesthesia opted to reintubate the patient.  His endotracheal tube was removed, and the patient was bagged for several minutes before we intubated with the GlideScope.  During reintubation, the patient was noted to have thick secretions; however, there was no obvious enteric content within them.  Reintubation occurred without trauma to the vocal cords.  At this point, we continued to pause on resuming our surgical procedure until the patient recovered from his respiratory distress.  We considered aborting the procedure entirely; however, over the course of several minutes, the patient's oxygen saturations did come up into the low 90s, and we elected to resume with an open right inguinal hernia repair.     After removing the infraumbilical  dominic and 5 mm ports, the right groin was palpated, and landmarks of the anterior superior iliac spine and pubic tubercle were identified.  An oblique incision was made along the iliopubic tract approximately 6 cm in length.  This incision was carried down through the subcutaneous tissues and Zach's fascia with electrocautery.  The aponeurosis of the external oblique was identified, and this was sharply divided with scissors along its length to the external ring.  The cord structures were then swept off the inguinal canal floor and encircled with a Penrose drain.  After this was accomplished, the cord was skeletonized.  We could appreciate a relatively large hernia sac coursing along the anterior medial aspect of the cord, and this contained some sliding fat.  After the hernia sac was  most of the way off the cord, it was opened, and we could palpate down to the base of the hernia sac into the peritoneal cavity.  There was a large chunk of fat in this region.  The hernia sac was ligated high and tied with a suture ligature using 3-0 silk.  There was some fat adherent to the inside of the hernia sac that was excised during this process.  Once the hernia was completely reduced, we reexamined the floor of the inguinal canal, and there was no obvious direct hernia defect.  The pubic tubercle was palpated medially, and this was chosen as a spot to anchor our mesh.  A lightweight polypropylene nonadherent mesh was selected, and this was cut into a keyhole shape tailored to the patient's size.  The corner of the mesh was tacked just above the pubic tubercle, the inferior portion of the mesh was tacked twice to the reflected edge of the inguinal ligament, and the upper portion of the mesh was tacked to the transversalis muscle underlying the aponeurosis of the external oblique.  A small opening was left to allow passage of the cord structures, and the tails of the mesh were then sewn together before the tails  were dunked underneath the aponeurosis of the external oblique. At this time I identified a tubular structure coursing along the cord.  Due to concerns that this was a nerve, which would later be vulnerable to entrapment in the mesh, this structure was clamped and excised and a portion sent off to pathology.     The aponeurosis of the external oblique was then closed over the mesh and cord structures.  In doing this, we recreated the external ring.  The wound was then irrigated with warm saline.  Zach's fascia was reapproximated with interrupted 3-0 Vicryl suture.  Deep dermal sutures were placed, also, using 3-0 Vicryl, and the skin was reapproximated with 4-0 Monocryl and Dermabond.  The infraumbilical laparoscopic port site was also closed using a figure-of-eight 0 Vicryl suture to close the anterior rectus sheath, and then Monocryl was used to close all the port incisions.  All incisions were then injected with 0.25% bupivacaine utilizing a total of approximately 20 mL.  Dermabond and sterile dressings were then applied.  At the end of the procedure, all counts were correct.  I was personally scrubbed for the entire procedure.  The patient was woken up from anesthesia; however, this was a protracted process as the patient had very marginal oxygen saturations and was slow to awaken.  It took us approximately 45 minutes to be able to successfully extubate the patient, and even then his oxygen saturations were marginal, and his level of consciousness was less than optimal.  Due to ongoing respiratory issues, the plan is to admit the patient overnight for observation at the [facility].         DIANELYS KAPOOR MD             D: 2018   T: 2018   MT: casi      Name:     ADELINA BEAN   MRN:      [id number]        Account:        [id number]   :      1965           Procedure Date: 2018      Document: [id number]

## 2018-12-04 NOTE — PROGRESS NOTES
piv removed.  Discharge instructions including f/u care, meds and activity restrictions reviewed with pt.  All questions answered.  Pt discharged to home w/ all belongings

## 2018-12-04 NOTE — PLAN OF CARE
Problem: Patient Care Overview  Goal: Plan of Care/Patient Progress Review  Outcome: No Change  Outpatient/Observation goals to be met before discharge home:    - Stable breathing on room -no, currently on 2 L with saturations in the low 90's, incentive spirometry and ambulation promoted   -Tolerating diet -yes   -Pain control on PO pain meds -yes, denies pain

## 2018-12-04 NOTE — PLAN OF CARE
Problem: Patient Care Overview  Goal: Plan of Care/Patient Progress Review  Outpatient/Observation goals to be met before discharge home:    - Stable breathing on room: No, on 5L   -Tolerating diet : Yes  -Pain control on PO pain meds: Yes

## 2018-12-04 NOTE — PLAN OF CARE
Problem: Patient Care Overview  Goal: Discharge Needs Assessment  Outpatient/Observation goals to be met before discharge home:  - Stable breathing on room - not yet, on 5L nasal canula  -Tolerating diet - yes  -Pain control on PO pain meds - yes    Notify Provider when observation goals have been met and patient is ready for discharge.  Pt pain managble with medications. Pt walking to and from bathroom independently.

## 2018-12-04 NOTE — ANESTHESIA POSTPROCEDURE EVALUATION
Anesthesia POST Procedure Evaluation    Patient: Yan Young   MRN:     [id number] Gender:   male   Age:    53 year old :      1965        Preoperative Diagnosis: Inguinal hernia   Procedure(s):  Laparoscopic Convert to Open Right Inguinal Hernia Repair with Mesh   Postop Comments: No value filed.       Anesthesia Type:  General    Reportable Event: NO     PAIN: Uncomplicated   Sign Out status: Comfortable, Well controlled pain     PONV:   Sign Out status  No Nausea or Vomiting     Neuro/Psych: Uneventful perioperative course   Sign Out Status: Preoperative baseline; Age appropriate mentation     Airway/Resp.:   Sign Out Status: Resp. Status within EXPECTED Parameters     CV: Uneventful perioperative course   Sign Out status: Appropriate BP and perfusion indices; Appropriate HR/Rhythm     Disposition:   Sign Out in:  PACU  Disposition:  Floor  Recovery Course: Uneventful  Follow-Up: Not required     Comments/Narrative:  Patient did well in recovery.  Became more awake and aware.  Pain well controlled and no nausea issues.  Respiratory status stable.  Patient being admitted more for the occurrences in the OR with respiratory issues.  His PACU stay was uneventful.           Last Anesthesia Record Vitals:  CRNA VITALS  12/3/2018 1407 - 12/3/2018 1507      12/3/2018             NIBP: 137/72    SpO2: 96 %          Last PACU/Preop Vitals:  Vitals:    18 1600 18 1615 18 1630   BP: 126/74 125/74 123/79   Pulse:      Resp: 17 16 18   Temp:   36.8  C (98.2  F)   SpO2: 93% 93% 93%         Electronically Signed By: Andi Mckinnon MD, December 3, 2018, 6:22 PM

## 2018-12-04 NOTE — PLAN OF CARE
Problem: Patient Care Overview  Goal: Discharge Needs Assessment  Outpatient/Observation goals to be met before discharge home:  - Stable breathing on room - not yet, on 4L nasal canula  -Tolerating diet - yes  -Pain control on PO pain meds - yes    Notify Provider when observation goals have been met and patient is ready for discharge.

## 2018-12-04 NOTE — DISCHARGE SUMMARY
[facility] Discharge Summary    Yan Young MRN: [id number]   YOB: 1965 Age: 53 year old     Date of Admission:  12/3/2018  Date of Discharge::  12/4/2018  Admitting Physician:  Vasiliy Sanders MD  Discharge Physician:  Vasiliy Sanders MD  Primary Care Physician:         Lenny Shaw          Admission Diagnoses:   1. Postoperative respiratory distress  2. S/p Inguinal hernia repair           Discharge Diagnosis:   1. Same         Procedures:   1. Laparoscopic converted to open inguinal hernia repair, 12/03/2018 by Dr. Alexa Enriquez           Consultations:   None          HPI (from H&P):   Yan Young is a 53 year old male with right inguinal hernia POD #0 s/p laparoscopic converted to open right inguinal hernia repair with lightweight polypropylene mesh (12/3/18) with Dr. Enriquez at [facility] c/b hypoxia/respiratory distress during procedure and reintubation intraoperatively. Extubated in PACU taking 45 minutes with marginal O2 saturation and was thus admitted for overnight observation. Voided independently post-op. Ambulated to [facility]. Passed small flatus.            Hospital Course:   The patient was admitted evening of 12/3/2018 (POD#0) for observation starting with 5LPM by nasal canula but tolerated 2 LPM by morning rounds on POD#1. Clinically, the patient was improving, was not dyspneic, ambulated without difficulty and produced adequate urine. Since his CT and US showed no evidence of DVT or pulmonary embolism, he was discharged on POD#1 to home in stable condition after maintaining adequate oxygen saturation on room air. The patient received appropriate education post operatively. The patient acknowledged understanding and were in agreement with the plan.         Final Pathology Result:   Not available at time of discharge; sent following specimen.    1. Hernia sac   2. Ilioinguinal nerve          Pertinent Imaging Results:   1. CT  pulmonary embolism, 12/4/2018  Negative for PE    2. US bilateral lower extremities, 12/4/2018  IMPRESSION:  No deep venous thrombus demonstrated in either lower  extremity.         Medications Prior to Admission:     Prescriptions Prior to Admission   Medication Sig Dispense Refill Last Dose     lisinopril (PRINIVIL/ZESTRIL) 10 MG tablet TAKE 1 TABLET BY MOUTH EVERY DAY 30 tablet 0 12/2/2018 at 0800            Discharge Medications:     Current Discharge Medication List      START taking these medications    Details   acetaminophen (TYLENOL) 325 MG tablet Take 2 tablets (650 mg) by mouth every 4 hours as needed for other or pain (multimodal surgical pain management along with NSAIDS and opioid medication as indicated based on pain control and physical function.)  Qty: 100 tablet, Refills: 1    Associated Diagnoses: S/P inguinal hernia repair      oxyCODONE (ROXICODONE) 5 MG tablet Take 1 tablet (5 mg) by mouth every 6 hours as needed for breakthrough pain (Take one tablet as needed every 6 hours for breakthrough pain if pain poorly controlled with tylenol alone)  Qty: 15 tablet, Refills: 0    Associated Diagnoses: S/P inguinal hernia repair      senna-docusate (SENOKOT-S/PERICOLACE) 8.6-50 MG tablet Take 1 tablet by mouth 2 times daily as needed for constipation (Please take for stool softening regimen while on oxycodone)  Qty: 100 tablet, Refills: 0    Associated Diagnoses: S/P inguinal hernia repair         CONTINUE these medications which have NOT CHANGED    Details   lisinopril (PRINIVIL/ZESTRIL) 10 MG tablet TAKE 1 TABLET BY MOUTH EVERY DAY  Qty: 30 tablet, Refills: 0    Associated Diagnoses: Essential hypertension with goal blood pressure less than 140/90                  Day of Discharge Physical Exam:   Temp:  [98.2  F (36.8  C)-99.4  F (37.4  C)] 98.8  F (37.1  C)  Heart Rate:  [] 108  Resp:  [12-18] 18  BP: (117-152)/(67-86) 152/81  SpO2:  [87 %-96 %] 91 %  Gen: NAD, lying comfortably in bed  Pulm:  NLB on RA  CV: RRR  Abdomen: soft, appropriately tender to palpation on POD 1. Non-peritonitic.   Extremities: warm and well-perfused, without cyanosis or edema         Discharge Instructions and Follow-Up:   - No lifting greater than 15 pounds for 4-6 weeks after surgery. Avoid any weight lifting if at all possible.    - You may shower and get incisions wet starting 48 hrs after surgery but do not scrub incisions or submerge wounds (aka, bath, pool, hot tub, ect.) for 2 weeks. Remove outer dressing (if placed) after 48 hrs from surgery. If dermabond was used, avoid applying any lotions or ointments. If steri-strips were used, they will fall off on their own. You may leave open to air or cover with dry gauze if needed for comfort.  - No driving while taking narcotic pain medications.   - If you develop constipation, take stool softeners such as colace or miralax but stop if you develop diarrhea. Wean yourself off of narcotics.   - Call your primary provider to touch base with them regarding your recent admission.   - If you develop any fever/chills, worsening pain, redness, swelling, or drainage from your wound please call (Clinic [phone]).            Follow-Up:       - Follow up with Dr. Enriquez in clinic in 2 week(s) after discharge. You should be called to make an appointment within 3 business days. If you are not contacted, call [phone] to make an appointment    - - - - - - - - - - - - - - - - - -  Bryn Delgadillo MS3    ----  I was present with the medical student who participated in the service and in the documentation of the note.  I have verified the history and personally performed the physical exam and medical decision making. Addenda have been made to the note as appropriate.  I agree with the assessment and plan of care as documented in the note.    Hair Khan MD  Surgery PGY-1  p: [phone]

## 2018-12-04 NOTE — PROGRESS NOTES
Attempted to wean patient off of oxygen. Desaturated to 86% and stated he was working hard to breathe so oxygen turned back on 2 LPM.

## 2018-12-04 NOTE — PROVIDER NOTIFICATION
Pt triggered sepsis protocol.  Spoke w/ md at [phone].  md would like HR rechecked in 15 minutes.  If HR under 90, pt ok to continue with discharge, if not, instructed to contact gen surg on call for further instructions.  Spoke with md.  HR 95.  OK to cancel lactate and discharge patient

## 2018-12-04 NOTE — H&P
GENERAL SURGERY H&P/CONSULT  December 3, 2018      ASSESSMENT: Yan Young is a 53 year old male POD0 s/p lap converted to open right inguinal hernia repair with mesh c/b hypoxia/respiratory distress intraop requiring re-intubation. Extubated in PACU, admitted for overnight observation. HDS. No SOB.    PLAN:    - Admit to Observation for respiratory status   - Acute pain control with tylenol, robaxin, oxycodone, dilaudid   - PTA lisinopril   - O2 as needed, Encourage pulmonary toilet  - Regular diet   - Ambulate as tolerated   - Re-evaluate in AM    Patient plan discussed with Sunday resident, Dr. Jag Ann who will discuss with staff, Dr. Sanders.    Hitesh Gutierrez MD  Surgery PGY1     HISTORY PRESENTING ILLNESS: Yan Young is a 53 year old male with right inguinal hernia POD #0 s/p laparoscopic converted to open right inguinal hernia repair with lightweight polypropylene mesh (12/3/18) with Dr. Enriquez at [facility] c/b hypoxia/respiratory distress during procedure and reintubation intraoperatively. Extubated in PACU, admitted for overnight observation.     Voided independently post-op. Ambulated to [facility]. Passed small flatus.     REVIEW OF SYSTEMS: As noted above. No headache, dizziness. No fever, chills. No chest pain or shortness of breath. No abdominal pain, nausea, vomiting. No urinary difficulties.     PAST MEDICAL HISTORY:   Past Medical History:   Diagnosis Date     Hypertension        SURGICAL HISTORY:   12/3/2018 Right inguinal hernia repair with lightweight polypropylene mesh     SOCIAL HISTORY: Tobacco - No. Etoh - No. Wife and daughter at bedside.   Social History     Social History     Marital status:      Spouse name: N/A     Number of children: N/A     Years of education: N/A     Occupational History     Not on file.     Social History Main Topics     Smoking status: Never Smoker     Smokeless tobacco: Never Used     Alcohol use Yes      Comment: minimal     Drug  use: No     Sexual activity: Not on file     Other Topics Concern     Parent/Sibling W/ Cabg, Mi Or Angioplasty Before 65f 55m? No     Social History Narrative       FAMILY HISTORY:    Family History   Problem Relation Age of Onset     Pancreatic Cancer Father      Arthritis Sister        ALLERGIES:    No Known Allergies    MEDICATIONS:    Current Facility-Administered Medications on File Prior to Encounter:  acetaminophen (TYLENOL) tablet 975 mg   ceFAZolin (ANCEF) intermittent infusion 1 g (pre-mix)   [COMPLETED] ceFAZolin (ANCEF) intermittent infusion 2 g in 50 mL dextrose PREMIX   gabapentin (NEURONTIN) capsule 300 mg   [COMPLETED] ipratropium - albuterol 0.5 mg/2.5 mg/3 mL (DUONEB) neb solution 3 mL   [COMPLETED] oxyCODONE (ROXICODONE) tablet 5 mg   scopolamine (TRANSDERM) 72 hr patch 1 patch   And   scopolamine (TRANSDERM-SCOP) Patch in Place   And   scopolamine (TRANSDERM-SCOP) patch REMOVAL   scopolamine (TRANSDERM) 72 hr patch 1 patch   And   scopolamine (TRANSDERM-SCOP) Patch in Place   [DISCONTINUED] albuterol (PROAIR HFA/PROVENTIL HFA/VENTOLIN HFA) 108 (90 Base) MCG/ACT inhaler   [DISCONTINUED] bupivacaine (MARCAINE) 0.25 % injection   [DISCONTINUED] dexamethasone PF (DECADRON) injection   [DISCONTINUED] fentaNYL (PF) (SUBLIMAZE) injection 25-50 mcg   [DISCONTINUED] fentaNYL (PF) (SUBLIMAZE) injection   [DISCONTINUED] HYDROmorphone (DILAUDID) injection   [DISCONTINUED] ketorolac (TORADOL) injection   [DISCONTINUED] labetalol (NORMODYNE/TRANDATE) injection   [DISCONTINUED] lactated ringers infusion   [DISCONTINUED] lidocaine 2% injection (MDV)   [DISCONTINUED] midazolam (VERSED) injection   [DISCONTINUED] ondansetron (ZOFRAN) injection   [DISCONTINUED] propofol (DIPRIVAN) infusion   [DISCONTINUED] propofol (DIPRIVAN) injection 10 mg/mL vial   [DISCONTINUED] rocuronium (ZEMURON) injection   [DISCONTINUED] sugammadex (BRIDION) injection     Current Outpatient Prescriptions on File Prior to  Encounter:  lisinopril (PRINIVIL/ZESTRIL) 10 MG tablet TAKE 1 TABLET BY MOUTH EVERY DAY       PHYSICAL EXAMINATION:  Temp:  [98  F (36.7  C)-98.8  F (37.1  C)] 98.5  F (36.9  C)  Pulse:  [92] 92  Heart Rate:  [] 69  Resp:  [12-24] 18  BP: (108-158)/(55-95) 143/82  SpO2:  [93 %-96 %] 93 %  General: Alert, well-appearing. In no acute distress.  HEENT: Normocephalic, atraumatic.    Respiratory: Non-labored breathing.  Cardiovascular: Regular rate and rhythm on monitor  Gastrointestinal: Abdomen soft, non-distended, non-tender to palpation. Incisions with dressing c/d/i.   Extremities: Moving all four extremities.     LABS: None    IMAGING:  CXR 12/3/2018: Pending     CO-MORBIDITIES:   No diagnosis found.    Hitesh Gutierrez MD  Surgery PGY1

## 2018-12-04 NOTE — PLAN OF CARE
Problem: Patient Care Overview  Goal: Plan of Care/Patient Progress Review  Outcome: Improving  Outpatient/Observation goals to be met before discharge home:    -Stable breathing on room -no, was weaned off of oxygen but desaturated to 86% and pt stated he was working hard to breathe, currently on 2 L  -Tolerating diet -yes   -Pain control on PO pain meds -yes

## 2018-12-04 NOTE — PLAN OF CARE
Problem: Patient Care Overview  Goal: Plan of Care/Patient Progress Review  Outpatient/Observation goals to be met before discharge home:    - Stable breathing on room: No, on 4L   - Tolerating diet : Yes  - Pain control on PO pain meds: Yes

## 2018-12-05 ENCOUNTER — PATIENT OUTREACH (OUTPATIENT)
Dept: SURGERY | Facility: CLINIC | Age: 53
End: 2018-12-05

## 2018-12-05 ENCOUNTER — TELEPHONE (OUTPATIENT)
Dept: FAMILY MEDICINE | Facility: CLINIC | Age: 53
End: 2018-12-05

## 2018-12-05 LAB — COPATH REPORT: NORMAL

## 2018-12-05 NOTE — PROGRESS NOTES
Yan Young is a patient of Dr. Alexa Enriquez that underwent an open RIH (converted from laparoscopic) approximately 2 days ago (12/3).  Attempted to contact patient via telephone for a status update and review post op teaching.  LM on  to call office.  Await return call.      Of note:  Pathology:  Pending  Wound:  Dermabond  Follow-up:  Post op appointment arranged with Dr. Enriquez 12/19 at 1100- [facility]  Restrictions:  - No strenuous exercise for 3-4 weeks  - No lifting, pushing, pulling more than 15-20 pounds for 3-4 weeks  New medications:  Tylenol, Senna, Oxycodone

## 2018-12-05 NOTE — TELEPHONE ENCOUNTER
ED / Discharge Outreach Protocol    Patient Contact    Attempt # 1    Was call answered?  No.  Left message on voicemail with information to call me back.    Marie Pepe RN

## 2018-12-07 NOTE — TELEPHONE ENCOUNTER
ED / Discharge Outreach Protocol    Patient Contact    Attempt # 2    Was call answered?  No.  Left message on voicemail with information to call me back.    Marie Pepe RN

## 2018-12-07 NOTE — PROGRESS NOTES
RN Post-Op/Post-Discharge Care Coordination Note    Mr. Yan Young is a 53 year old male who underwent Laparoscopic converted to open RIH repair (for hypoxia) on 12/3 with  Dr. Alexa Enriquez.  Spoke with Patient.    Support  Patient able to care for self independently     Health Status  Fevers/chills: Patient denies any fever or chills.  Nausea/Vomiting: Patient denies nausea/vomiting.  Eating/drinking: Patient is able to eat and drink without any complaints.  Bowel habits: Patient reports having a normal bowel movement.  Drains (MT): N/A  Incisions: Patient denies any signs and symptoms of infection..  Wound closure:  Dermabond. Discussed with patient to not use Neosporin to his incisions until at least 14 days. He may shower with soap and water and leave area SHAHRAM.  Pain: patient is using Tylenol and Ibuprofen prn. He states he is having pain to his right lower back- worse than surgical pain. Discussed it may be from positioning during surgery. The pain seems to be getting better daily.  New Medications:  Tylenol, Oxycodone, Senna    Patient's biggest concerns were regarding his intubation and anesthesia, and why a simple procedure ended up with an ambulance ride and a night in the hospital. He could not understand why he had numerous tests done an nobody could explain to his why he was having breathing issues. He states his biggest pain is a tightness in his chest and that he occasionally feels lightheaded. He states he is healthy and active and has never had breathing problems in the past- he was frustrated by the fact that no one was able to provide him with any answers. He is getting the IS to 2500 and his chest tightness seems to be getting better. Discussed having Dr. Enriquez call him as well as anesthesia but he states that isn't necessary. Offered patient relation number and he states that isn't necessary also. Discussed following up with his PCP if he continues to have chest tightness or pain and he  states he will keep that in mind. He has f/u scheduled with Dr. Enriquez 12/19.    Activity/Restrictions  No lifting in excess of 15-20 pounds for 3-4 weeks    Equipment  None    Pathology reviewed with patient:  N/A    Forms/Letters  No    All of his questions were answered including reviewing restrictions, and wound care.  He will call this office if he has any further questions and/or concerns.      12/19 at 11 with Dr. Enriquez.    A copy of this note was routed to the primary surgeon.      Whom and When to Call  Patient acknowledges understanding of how to manage any medication changes and   when to seek medical care.     Patient advised that if after hour medical concerns arise to please call [phone] and choose option 4 to speak to the physician on call.

## 2018-12-18 ENCOUNTER — TELEPHONE (OUTPATIENT)
Dept: SURGERY | Facility: CLINIC | Age: 53
End: 2018-12-18

## 2018-12-18 NOTE — TELEPHONE ENCOUNTER
Established Patient Telephone Reminder Call    Date of call:  12/18/18  Phone numbers:  Home number on file [phone] (home)    Reached patient/confirmed appointment:  Yes  Appointment with:   Dr. Alexa Enriquez  Reason for visit:  Post op

## 2018-12-19 ENCOUNTER — OFFICE VISIT (OUTPATIENT)
Dept: SURGERY | Facility: CLINIC | Age: 53
End: 2018-12-19
Payer: COMMERCIAL

## 2018-12-19 VITALS
HEART RATE: 98 BPM | HEIGHT: 63 IN | DIASTOLIC BLOOD PRESSURE: 98 MMHG | WEIGHT: 156.6 LBS | OXYGEN SATURATION: 93 % | SYSTOLIC BLOOD PRESSURE: 137 MMHG | BODY MASS INDEX: 27.75 KG/M2 | TEMPERATURE: 98.8 F

## 2018-12-19 DIAGNOSIS — K40.90 NON-RECURRENT UNILATERAL INGUINAL HERNIA WITHOUT OBSTRUCTION OR GANGRENE: Primary | ICD-10-CM

## 2018-12-19 ASSESSMENT — MIFFLIN-ST. JEOR: SCORE: 1440.46

## 2018-12-19 ASSESSMENT — PAIN SCALES - GENERAL: PAINLEVEL: NO PAIN (0)

## 2018-12-19 NOTE — H&P
"History and Physical    Reason for visit:  Right inguinal hernia    Yan Young is a 53M with PMHx significant for HTN, who presents for evaluation of a right inguinal hernia.  He states that he has notice pain in his right groin region for ~ 6 month, which is worse with activity.  He went to his primary care physician last month thinking that there must be something \"torn\" down there.      The patient states that he also notices a right groin bulge when he stands or bears down.  This bulge goes away when he lies flat.  It is intermittently tender.      PMHx:  HTN    PSHx:  Knee arthroscopy    Medications:  Lisinopril    Allergies:  NKA    Social history:    Never smoker  Denies use of illicit drugs  ETOH- occasionally    Lives with wife  Avid cyclist, snowmobiler    BP (!) 165/130  Pulse 115  Temp 97.8  F (36.6  C) (Oral)  Ht 1.6 m (5' 3\")  Wt 71.7 kg (158 lb)  SpO2 95%  BMI 27.99 kg/m2  RRR  CTAB  Abd soft, non distended, non tender  R groin- + hernia, no scrotal extension, testes normal and descended, hernia reduces easily with patient lying flat.      A/P:  53M with reducible right inguinal hernia scheduled for elective laparoscopic repair today.      "

## 2018-12-19 NOTE — LETTER
"12/19/2018       RE: Yan Young  [address] [place] [address]     Dear Colleague,    Thank you for referring your patient, Yan Young, to the [facility] GENERAL SURGERY at [facility]. Please see a copy of my visit note below.    Surgery Clinic Note    F/u s/p lap converted to open inguinal hernia repair 12/03    HPI:  Yan Young is a 53M who underwent lap converted to open inguinal hernia repair on 12/03.  His procedure was complicated by profound hypoxia intraop requiring reintubation in the OR and conversion to an open procedure.  Post op he was admitted to the hospital and required 5L NC overnight.  He underwent a CXR and CT PE, both of which were both unremarkable.      Since discharge, the patient states that he has had minimal discomfort in his right groin, and an intermittent \"pinching\" sensation.  But he is requiring minimal pain medication and is only taking PRN tylenol and ibuprofen.  His bowel habits have been normal and he is tolerating a regular diet.  He denies nausea or vomiting.  His incision have been dry, not draining, with no erythema/cellulitis.  He has felt no bulge in his right groin but does report some numbness on the inside of his right thigh    BP (!) 137/98   Pulse 98   Temp 98.8  F (37.1  C) (Oral)   Ht 1.6 m (5' 3\")   Wt 71 kg (156 lb 9.6 oz)   SpO2 93%   BMI 27.74 kg/m     RRR  CTAB  Abd soft, non distended, non tenders, lap incisions clean dry, and intact.  Right inguinal incision with dermabond still in place, clean and intact, firm healing ridge but no fluctuance or signs of infection.    Ext:  Non edematous    A/P:  53M s/p lap converted to open inguinal repair.   Recovering from surgery as expected with minimal pain, no evidence of recurrence.   Intermitted pinching sensation is expected to diminish over time.  Ilioinguinal nerve was identified and deliberated ligated/ resected during the procedure.  However, if " pinching sensation continues beyond ~ 8 weeks, will reassess.    Pt to follow up with primary care physician regarding hypoxia/ respiratory issues.  Sympotmatically these have resolved, however the patient still is only saturating 93% on RA today.      F/u with surgery PRN.      Alexa Enriquez  12/24/18

## 2018-12-19 NOTE — PROGRESS NOTES
"Surgery Clinic Note    F/u s/p lap converted to open inguinal hernia repair 12/03    HPI:  Yan Young is a 53M who underwent lap converted to open inguinal hernia repair on 12/03.  His procedure was complicated by profound hypoxia intraop requiring reintubation in the OR and conversion to an open procedure.  Post op he was admitted to the hospital and required 5L NC overnight.  He underwent a CXR and CT PE, both of which were both unremarkable.      Since discharge, the patient states that he has had minimal discomfort in his right groin, and an intermittent \"pinching\" sensation.  But he is requiring minimal pain medication and is only taking PRN tylenol and ibuprofen.  His bowel habits have been normal and he is tolerating a regular diet.  He denies nausea or vomiting.  His incision have been dry, not draining, with no erythema/cellulitis.  He has felt no bulge in his right groin but does report some numbness on the inside of his right thigh    BP (!) 137/98   Pulse 98   Temp 98.8  F (37.1  C) (Oral)   Ht 1.6 m (5' 3\")   Wt 71 kg (156 lb 9.6 oz)   SpO2 93%   BMI 27.74 kg/m    RRR  CTAB  Abd soft, non distended, non tenders, lap incisions clean dry, and intact.  Right inguinal incision with dermabond still in place, clean and intact, firm healing ridge but no fluctuance or signs of infection.    Ext:  Non edematous    A/P:  53M s/p lap converted to open inguinal repair.   Recovering from surgery as expected with minimal pain, no evidence of recurrence.   Intermitted pinching sensation is expected to diminish over time.  Ilioinguinal nerve was identified and deliberated ligated/ resected during the procedure.  However, if pinching sensation continues beyond ~ 8 weeks, will reassess.    Pt to follow up with primary care physician regarding hypoxia/ respiratory issues.  Sympotmatically these have resolved, however the patient still is only saturating 93% on RA today.      F/u with surgery PRN.    Alexa" Twyla Enriquez  12/24/18

## 2018-12-19 NOTE — NURSING NOTE
"Chief Complaint   Patient presents with     Surgical Followup     Open inguinal hernia, Resp distress       Vitals:    12/19/18 1117   BP: (!) 137/98   Pulse: 98   Temp: 98.8  F (37.1  C)   TempSrc: Oral   SpO2: 93%   Weight: 71 kg (156 lb 9.6 oz)   Height: 1.6 m (5' 3\")       Body mass index is 27.74 kg/m .    Ernesto Mcnair, EMT on 12/19/2018 at 11:19 AM                       "

## 2018-12-19 NOTE — PATIENT INSTRUCTIONS
You met with Dr. Alexa Enriquez.     EXAMINATION 12/04/2018: Bilateral lower extremity venous Doppler ultrasound.     COMPARISON: None available     HISTORY: Assessment for DVT; dyspna after surgery.     FINDINGS: Grayscale, color, and spectral Doppler imaging of the lower  extremities was performed.     The right common femoral, femoral, popliteal, and posterior tibial  veins are fully compressible with patent Doppler wave forms. No  thrombus is identified within them on grayscale imaging.     The left common femoral, femoral, popliteal, and posterior tibial  veins are fully compressible with patent Doppler wave forms. No  thrombus is identified within them on grayscale imaging.                                                                      IMPRESSION:  No deep venous thrombus demonstrated in either lower  extremity.     I have personally reviewed the examination and initial interpretation  and I agree with the findings.        EXAMINATION: CT CHEST PULMONARY EMBOLISM W CONTRAST, 12/4/2018 10:45  AM      COMPARISON: None. Chest x-ray 12/4/2018.     HISTORY: 52 yo M without significant medical history, without COPD,  and no smoking history who is POD#1 from laparoscopic converted to  open right inguinal hernia repair with lightweight polypropylene mesh  who was intended to discharge to home on POD#0 but was kept overnight  secondary to prolonged extubation. He was on 5L NC overnight and  tolerating 2 L NC this morning on rounds.      TECHNIQUE: Volumetric helical acquisition of CT images of the chest  from the lung apices to the kidneys were acquired after the  administration of 56 mL of Isovue-370 IV contrast. Three-dimensional  (3D) post-processed angiographic images were reconstructed, archived  to PACS and used in interpretation of this study.     FINDINGS:   Contrast bolus timing is adequate. There is no pulmonary embolism.     Normal size heart, aorta and pulmonary artery. No  pericardial  effusion.     Enlarged and heterogeneous appearing thyroid gland. A focus of  microcalcification is seen in the right lobe.     Bibasilar prominent parenchymal bands/fibroatelectasis. No  pneumothorax or pleural effusion. No consolidation in the lungs.     No pathologically enlarged lymph nodes in the chest.     Visualization of the upper abdomen is limited. Pneumoperitoneum  visualized. Hepatic steatosis.     Bones and soft tissues: Free air tracking along the fascial planes of  the body wall and chest wall, as well as foci of free air extending  into the anterior mediastinum.     Within the right fifth rib is a region of expansion mixed with lucent  and sclerotic regions. In the left posterior 6 rib is a region of  cortical irregularity about the inner surface. Sclerotic focus  posteriorly in the right third rib.                                                                      IMPRESSION:   1. Negative for pulmonary embolism.  2. Extensive postoperative free air, including in the body and chest  wall, anterior mediastinum, and pneumoperitoneum.  3. A few regions of cortical irregularity, as well as lucent expansion  and sclerosis in the ribs, as described above. Differential includes  fibrous dysplasia, enchondroma, multiple myeloma or metastasis.  4. Enlarged and heterogeneous thyroid gland with microcalcification in  the right lobe. Recommend thyroid ultrasound for further evaluation.        Today's visit instructions:    Return to the Surgery Clinic on an as needed basis       If you have questions please contact Jaiden RN or Leia RN during regular clinic hours, Monday through Friday 7:30 AM - 4:00 PM, or you can contact us via [employer] at anytime.       If you have urgent needs after-hours, weekends, or holidays please call the hospital at [phone] and ask to speak with our on-call General Surgery Team.    Appointment [phone], option #1   Nurse Advice (Jaiden or Leia):  [phone]   Surgery Scheduler (Keyla): [phone]  Fax: [phone]

## 2018-12-24 DIAGNOSIS — I10 ESSENTIAL HYPERTENSION WITH GOAL BLOOD PRESSURE LESS THAN 140/90: ICD-10-CM

## 2018-12-26 NOTE — TELEPHONE ENCOUNTER
"Requested Prescriptions   Pending Prescriptions Disp Refills     lisinopril (PRINIVIL/ZESTRIL) 10 MG tablet [Pharmacy Med Name: LISINOPRIL 10 MG TABLET]    Last Written Prescription Date:  11/19/2018  Last Fill Quantity: 30 tablet    ,  # refills: 0   Last Office Visit: 10/18/2018   Future Office Visit:      30 tablet 0     Sig: TAKE 1 TABLET BY MOUTH EVERY DAY    ACE Inhibitors (Including Combos) Protocol Failed - 12/24/2018  1:06 PM       Failed - Blood pressure under 140/90 in past 12 months    BP Readings from Last 3 Encounters:   12/19/18 (!) 137/98   12/04/18 146/85   12/03/18 123/79          Passed - Recent (12 mo) or future (30 days) visit within the authorizing provider's specialty    Patient had office visit in the last 12 months or has a visit in the next 30 days with authorizing provider or within the authorizing provider's specialty.  See \"Patient Info\" tab in inbasket, or \"Choose Columns\" in Meds & Orders section of the refill encounter.           Passed - Patient is age 18 or older       Passed - Normal serum creatinine on file in past 12 months    Recent Labs   Lab Test 12/04/18  0639   CR 1.00          Passed - Normal serum potassium on file in past 12 months    Recent Labs   Lab Test 12/04/18  0639   POTASSIUM 3.9             " Please follow up with your PMD.  Return if you have worsening pain, dizziness, headache, shortness of breath, fever, chills.

## 2018-12-27 ENCOUNTER — MYC REFILL (OUTPATIENT)
Dept: FAMILY MEDICINE | Facility: CLINIC | Age: 53
End: 2018-12-27

## 2018-12-27 DIAGNOSIS — I10 ESSENTIAL HYPERTENSION WITH GOAL BLOOD PRESSURE LESS THAN 140/90: ICD-10-CM

## 2018-12-28 RX ORDER — LISINOPRIL 10 MG/1
TABLET ORAL
Qty: 30 TABLET | Refills: 0 | Status: SHIPPED | OUTPATIENT
Start: 2018-12-28 | End: 2019-01-16

## 2018-12-28 RX ORDER — LISINOPRIL 10 MG/1
10 TABLET ORAL DAILY
Qty: 0.01 TABLET | Refills: 0 | OUTPATIENT
Start: 2018-12-28

## 2018-12-28 NOTE — TELEPHONE ENCOUNTER
Routing refill request to provider for review/approval because:  BP Readings from Last 3 Encounters:   12/19/18 (!) 137/98   12/04/18 146/85   12/03/18 123/79

## 2018-12-29 NOTE — TELEPHONE ENCOUNTER
"Requested Prescriptions   Pending Prescriptions Disp Refills     lisinopril (PRINIVIL/ZESTRIL) 10 MG tablet 30 tablet 0    Refused Prescriptions:                       Disp   Refills    lisinopril (PRINIVIL/ZESTRIL) 10 MG tablet 0.01 t*0        Sig: Take 1 tablet (10 mg) by mouth daily  Refused By: PONCE XIONG  Reason for Refusal: Duplicate     Sig: Take 1 tablet (10 mg) by mouth daily    ACE Inhibitors (Including Combos) Protocol Failed - 12/28/2018  5:13 PM       Failed - Blood pressure under 140/90 in past 12 months    BP Readings from Last 3 Encounters:   12/19/18 (!) 137/98   12/04/18 146/85   12/03/18 123/79                Passed - Recent (12 mo) or future (30 days) visit within the authorizing provider's specialty    Patient had office visit in the last 12 months or has a visit in the next 30 days with authorizing provider or within the authorizing provider's specialty.  See \"Patient Info\" tab in inbasket, or \"Choose Columns\" in Meds & Orders section of the refill encounter.             Passed - Patient is age 18 or older       Passed - Normal serum creatinine on file in past 12 months    Recent Labs   Lab Test 12/04/18  0639   CR 1.00            Passed - Normal serum potassium on file in past 12 months    Recent Labs   Lab Test 12/04/18  0639   POTASSIUM 3.9               "

## 2019-01-10 ENCOUNTER — TELEPHONE (OUTPATIENT)
Dept: FAMILY MEDICINE | Facility: CLINIC | Age: 54
End: 2019-01-10

## 2019-01-16 ENCOUNTER — OFFICE VISIT (OUTPATIENT)
Dept: FAMILY MEDICINE | Facility: CLINIC | Age: 54
End: 2019-01-16
Payer: COMMERCIAL

## 2019-01-16 VITALS
DIASTOLIC BLOOD PRESSURE: 68 MMHG | OXYGEN SATURATION: 98 % | RESPIRATION RATE: 16 BRPM | HEART RATE: 132 BPM | HEIGHT: 63 IN | SYSTOLIC BLOOD PRESSURE: 118 MMHG | BODY MASS INDEX: 27.64 KG/M2 | WEIGHT: 156 LBS

## 2019-01-16 DIAGNOSIS — I10 ESSENTIAL HYPERTENSION WITH GOAL BLOOD PRESSURE LESS THAN 140/90: ICD-10-CM

## 2019-01-16 DIAGNOSIS — D72.828 OTHER ELEVATED WHITE BLOOD CELL (WBC) COUNT: Primary | ICD-10-CM

## 2019-01-16 DIAGNOSIS — L72.0 EPIDERMAL CYST OF NECK: ICD-10-CM

## 2019-01-16 PROCEDURE — 99214 OFFICE O/P EST MOD 30 MIN: CPT | Performed by: FAMILY MEDICINE

## 2019-01-16 RX ORDER — LISINOPRIL 10 MG/1
10 TABLET ORAL DAILY
Qty: 90 TABLET | Refills: 3 | Status: SHIPPED | OUTPATIENT
Start: 2019-01-16 | End: 2019-10-01 | Stop reason: DRUGHIGH

## 2019-01-16 ASSESSMENT — MIFFLIN-ST. JEOR: SCORE: 1437.74

## 2019-01-16 NOTE — PATIENT INSTRUCTIONS
Elevation in WBC attributed to surgery/cortisol - could recheck in the future to verify it has returned to normal.    Consider shingles vaccine.    Continue current dose of lisinopril.    Given apparent change of neck nodule/cyst recommending non-urgent ultrasound to better characterize it.
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
## 2019-01-28 ENCOUNTER — HOSPITAL ENCOUNTER (OUTPATIENT)
Dept: ULTRASOUND IMAGING | Facility: CLINIC | Age: 54
Discharge: HOME | End: 2019-01-28
Admitting: RADIOLOGY
Payer: COMMERCIAL

## 2019-01-28 DIAGNOSIS — L72.0 EPIDERMAL CYST OF NECK: ICD-10-CM

## 2019-01-28 PROCEDURE — 76536 US EXAM OF HEAD AND NECK: CPT

## 2019-09-09 ENCOUNTER — ANCILLARY PROCEDURE (OUTPATIENT)
Dept: GENERAL RADIOLOGY | Facility: CLINIC | Age: 54
End: 2019-09-09
Attending: INTERNAL MEDICINE
Payer: COMMERCIAL

## 2019-09-09 ENCOUNTER — OFFICE VISIT (OUTPATIENT)
Dept: URGENT CARE | Facility: URGENT CARE | Age: 54
End: 2019-09-09
Payer: COMMERCIAL

## 2019-09-09 VITALS
SYSTOLIC BLOOD PRESSURE: 136 MMHG | DIASTOLIC BLOOD PRESSURE: 82 MMHG | TEMPERATURE: 98.2 F | WEIGHT: 150 LBS | BODY MASS INDEX: 26.57 KG/M2 | OXYGEN SATURATION: 96 % | HEART RATE: 93 BPM

## 2019-09-09 DIAGNOSIS — M79.645 PAIN OF FINGER OF LEFT HAND: ICD-10-CM

## 2019-09-09 DIAGNOSIS — S62.627A CLOSED DISPLACED FRACTURE OF MIDDLE PHALANX OF LEFT LITTLE FINGER, INITIAL ENCOUNTER: Primary | ICD-10-CM

## 2019-09-09 PROCEDURE — 73140 X-RAY EXAM OF FINGER(S): CPT | Mod: LT

## 2019-09-09 PROCEDURE — 99214 OFFICE O/P EST MOD 30 MIN: CPT | Performed by: INTERNAL MEDICINE

## 2019-09-09 NOTE — PROGRESS NOTES
SUBJECTIVE:   Yan Young is a 54 year old male presenting with a chief complaint of   Chief Complaint   Patient presents with     Urgent Care     Pt in clinic to have eval for finger injury.     Finger       He is an established patient of [facility].    MS Injury/Pain    Onset of symptoms was 3 week(s) ago.  Location: left hand and finger  fifth  Context:       The injury happened while playing      Mechanism: direct blow, playing volley ball      Patient experienced immediate pain, immediate swelling  Course of symptoms is improving.    Current and Associated symptoms: Pain and Swelling  Denies  Decreased range of motion  Aggravating Factors: none  Therapies to improve symptoms include: ice and elevation    Review of Systems    Past Medical History:   Diagnosis Date     Hypertension      Family History   Problem Relation Age of Onset     Pancreatic Cancer Father      Arthritis Sister      Current Outpatient Medications   Medication Sig Dispense Refill     lisinopril (PRINIVIL/ZESTRIL) 10 MG tablet Take 1 tablet (10 mg) by mouth daily 90 tablet 3     acetaminophen (TYLENOL) 325 MG tablet Take 2 tablets (650 mg) by mouth every 4 hours as needed for other or pain (multimodal surgical pain management along with NSAIDS and opioid medication as indicated based on pain control and physical function.) (Patient not taking: Reported on 9/9/2019) 100 tablet 1     oxyCODONE (ROXICODONE) 5 MG tablet Take 1 tablet (5 mg) by mouth every 6 hours as needed for breakthrough pain (Take one tablet as needed every 6 hours for breakthrough pain if pain poorly controlled with tylenol alone) (Patient not taking: Reported on 12/19/2018) 15 tablet 0     senna-docusate (SENOKOT-S/PERICOLACE) 8.6-50 MG tablet Take 1 tablet by mouth 2 times daily as needed for constipation (Please take for stool softening regimen while on oxycodone) (Patient not taking: Reported on 12/19/2018) 100 tablet 0     Social History     Tobacco Use     Smoking  status: Never Smoker     Smokeless tobacco: Never Used   Substance Use Topics     Alcohol use: Yes     Comment: minimal       OBJECTIVE  /82   Pulse 93   Temp 98.2  F (36.8  C) (Oral)   Wt 68 kg (150 lb)   SpO2 96%   BMI 26.57 kg/m      Physical Exam   Constitutional: He appears well-developed and well-nourished.   Musculoskeletal:   left hand    5th digit   Pain greatest at PIP and then mid phalynx with swelling  Poor alignment  Good range of motion   nontender MCP, DIP joint/ prox & distal phalynx.    Remainder hand exam normal    Vitals reviewed.      Labs:  No results found for this or any previous visit (from the past 24 hour(s)).    X-Ray was done, my findings are: in joint space of PIP joint - white density noted, concern for fracture     ASSESSMENT:      ICD-10-CM    1. Closed displaced fracture of middle phalanx of left little finger, initial encounter S62.627A ORTHO  REFERRAL   2. Pain of finger of left hand M79.645 XR Finger Left G/E 2 Views        Medical Decision Making:    Differential Diagnosis:  MS Injury Pain: sprain and fracture      PLAN:      Patient Instructions   Finger splint  Ortho referral    Elevate for swelling reduction  Res.  Ice

## 2019-09-09 NOTE — PATIENT INSTRUCTIONS
Finger splint  Ortho referral    Elevate for swelling reduction  Res.  Ice       Patient Education     Finger Fracture, Closed  You have a broken finger (fracture). This causes local pain, swelling, and bruising. This injury usually takes about 4 to 6 weeks to heal, but can take longer in some cases. Finger injuries are often treated with a splint or cast, or by taping the injured finger to the next one (jered taping). This protects the injured finger and holds the bone in position while it heals. More serious fractures may need surgery.     If the fingernail has been severely injured, it will probably fall off in 1 to 2 weeks. A new fingernail will usually start to grow back within a month.  Home care  Follow these guidelines when caring for yourself at home:    Keep your hand elevated to reduce pain and swelling. When sitting or lying down keep your arm above the level of your heart. You can do this by placing your arm on a pillow that rests on your chest or on a pillow at your side. This is most important during the first 2 days (48 hours) after the injury.    Put an ice pack on the injured area. Do this for 20 minutes every 1 to 2 hours the first day for pain relief. You can make an ice pack by wrapping a plastic bag of ice cubes in a thin towel. As the ice melts, be careful that the cast or splint doesn t get wet. Continue using the ice pack 3 to 4 times a day until the pain and swelling go away.    Keep the cast or splint completely dry at all times. Bathe with your cast or splint out of the water. Protect it with a large plastic bag, rubber-banded at the top end. If a fiberglass cast or splint gets wet, you can dry it with a hair dryer.    If jered tape was put on and it becomes wet or dirty, change it. You may replace it with paper, plastic, or cloth tape. Cloth tape and paper tapes must be kept dry. Keep the jered tape in place for at least 4 weeks.    You may use acetaminophen or ibuprofen to control pain,  unless another pain medicine was prescribed. If you have chronic liver or kidney disease, talk with your healthcare provider before using these medicines. Also talk with your provider if you ve had a stomach ulcer or gastrointestinal bleeding.    Don t put creams or objects under the cast if you have itching.  Follow-up care  Follow up with your healthcare provider, or as advised. This is to make sure the bone is healing the way it should.  X-rays may be taken. You will be told of any new findings that may affect your care.  When to seek medical advice  Call your healthcare provider right away if any of these occur:    The plaster cast or splint becomes wet or soft    The cast or splint cracks    The fiberglass cast or splint stays wet for more than 24 hours    Pain or swelling gets worse    Redness, warmth, swelling, drainage from the wound, or foul odor from a cast or splint    Finger becomes more cold, blue, numb, or tingly    You can t move your finger    The skin around the cast or splint becomes red    Fever of 100.4 F (38 C) or higher, or as directed by your healthcare provider  Date Last Reviewed: 2/1/2017 2000-2018 [employer]. [address] [place] [address], [place] [address]. All rights reserved. This information is not intended as a substitute for professional medical care. Always follow your healthcare professional's instructions.

## 2019-09-16 ENCOUNTER — OFFICE VISIT (OUTPATIENT)
Dept: ORTHOPEDICS | Facility: CLINIC | Age: 54
End: 2019-09-16
Payer: COMMERCIAL

## 2019-09-16 VITALS
DIASTOLIC BLOOD PRESSURE: 98 MMHG | WEIGHT: 150 LBS | SYSTOLIC BLOOD PRESSURE: 160 MMHG | BODY MASS INDEX: 26.58 KG/M2 | HEIGHT: 63 IN

## 2019-09-16 DIAGNOSIS — S63.637A SPRAIN OF INTERPHALANGEAL JOINT OF LEFT LITTLE FINGER, INITIAL ENCOUNTER: Primary | ICD-10-CM

## 2019-09-16 PROCEDURE — 99203 OFFICE O/P NEW LOW 30 MIN: CPT | Performed by: ORTHOPAEDIC SURGERY

## 2019-09-16 ASSESSMENT — MIFFLIN-ST. JEOR: SCORE: 1405.53

## 2019-09-16 NOTE — PATIENT INSTRUCTIONS
Use the spring splint to improve extension of the PIP joint mostly at night for the next 3 weeks  Resume activities during the day  Follow-up if there are any problems

## 2019-09-16 NOTE — LETTER
9/16/2019         RE: Yan Young  [address] [place] [address]        Dear Colleague,    Thank you for referring your patient, Yan Young, to the [facility] ORTHOPEDIC SURGERY. Please see a copy of my visit note below.    HISTORY OF PRESENT ILLNESS:    Yan Young is a 54 year old male who is seen for evaluation of the left little finger injury sustained in early part of August 2019.  He was playing basketball and his finger got jammed.  He did not really think much of it from having had similar injuries in the past.  He was in fact able to golf next day.  Since then he has noted persisting swelling which was felt to be lasting longer than he anticipated.  Pain was mild but tolerable.  He came in to the urgent care on September 9, 2019.  X-rays were taken.  There was felt to be a small avulsion fracture and for that reason he is here for further evaluation.  He has not had any significant numbness or tingling sensation.  He was given a splint at the time that he was seen at the urgent care.  He is right-hand dominant.    Present symptoms: Swelling and mild discomfort  Treatments tried to this point: None charge for that they avoid suspect splint in a closed  Orthopedic PMH: History of knee pain    Past Medical History:   Diagnosis Date     Hypertension        Past Surgical History:   Procedure Laterality Date     LAPAROSCOPIC HERNIORRHAPHY INGUINAL Right 12/3/2018    Procedure: Laparoscopic Convert to Open Right Inguinal Hernia Repair with Mesh;  Surgeon: Alexa Enriquez MD;  Location: [facility] OR       Family History   Problem Relation Age of Onset     Pancreatic Cancer Father      Arthritis Sister        Social History     Socioeconomic History     Marital status:      Spouse name: Not on file     Number of children: Not on file     Years of education: Not on file     Highest education level: Not on file   Occupational History     Not on file   Social Needs      Financial resource strain: Not on file     Food insecurity:     Worry: Not on file     Inability: Not on file     Transportation needs:     Medical: Not on file     Non-medical: Not on file   Tobacco Use     Smoking status: Never Smoker     Smokeless tobacco: Never Used   Substance and Sexual Activity     Alcohol use: Yes     Comment: minimal     Drug use: No     Sexual activity: Not on file   Lifestyle     Physical activity:     Days per week: Not on file     Minutes per session: Not on file     Stress: Not on file   Relationships     Social connections:     Talks on phone: Not on file     Gets together: Not on file     Attends Holiness service: Not on file     Active member of club or organization: Not on file     Attends meetings of clubs or organizations: Not on file     Relationship status: Not on file     Intimate partner violence:     Fear of current or ex partner: Not on file     Emotionally abused: Not on file     Physically abused: Not on file     Forced sexual activity: Not on file   Other Topics Concern     Parent/sibling w/ CABG, MI or angioplasty before 65F 55M? No   Social History Narrative     Not on file       Current Outpatient Medications   Medication Sig Dispense Refill     lisinopril (PRINIVIL/ZESTRIL) 10 MG tablet Take 1 tablet (10 mg) by mouth daily 90 tablet 3     acetaminophen (TYLENOL) 325 MG tablet Take 2 tablets (650 mg) by mouth every 4 hours as needed for other or pain (multimodal surgical pain management along with NSAIDS and opioid medication as indicated based on pain control and physical function.) (Patient not taking: Reported on 9/9/2019) 100 tablet 1     oxyCODONE (ROXICODONE) 5 MG tablet Take 1 tablet (5 mg) by mouth every 6 hours as needed for breakthrough pain (Take one tablet as needed every 6 hours for breakthrough pain if pain poorly controlled with tylenol alone) (Patient not taking: Reported on 12/19/2018) 15 tablet 0     senna-docusate (SENOKOT-S/PERICOLACE) 8.6-50 MG  "tablet Take 1 tablet by mouth 2 times daily as needed for constipation (Please take for stool softening regimen while on oxycodone) (Patient not taking: Reported on 12/19/2018) 100 tablet 0       No Known Allergies    REVIEW OF SYSTEMS:  CONSTITUTIONAL:  NEGATIVE for fever, chills, change in weight  INTEGUMENTARY/SKIN:  NEGATIVE for worrisome rashes, moles or lesions  EYES:  NEGATIVE for vision changes or irritation  ENT/MOUTH:  NEGATIVE for ear, mouth and throat problems  RESP:  NEGATIVE for significant cough or SOB  BREAST:  NEGATIVE for masses, tenderness or discharge  CV:  NEGATIVE for chest pain, palpitations or peripheral edema  GI:  NEGATIVE for nausea, abdominal pain, heartburn, or change in bowel habits  :  Negative   MUSCULOSKELETAL:  See HPI above  NEURO:  NEGATIVE for weakness, dizziness or paresthesias  ENDOCRINE:  NEGATIVE for temperature intolerance, skin/hair changes  HEME/ALLERGY/IMMUNE:  NEGATIVE for bleeding problems  PSYCHIATRIC:  NEGATIVE for changes in mood or affect      PHYSICAL EXAM:  BP (!) 160/98 (BP Location: Right arm, Patient Position: Chair, Cuff Size: Adult Regular)   Ht 1.6 m (5' 3\")   Wt 68 kg (150 lb)   BMI 26.57 kg/m     Body mass index is 26.57 kg/m .   GENERAL APPEARANCE: healthy, alert and no distress   HEENT: No apparent thyroid megaly. Clear sclera with normal ocular movement  RESPIRATORY: No labored breathing  SKIN: no suspicious lesions or rashes  NEURO: Normal strength and tone, mentation intact and speech normal  VASCULAR: Good pulses, and capillary refill   LYMPH: no lymphadenopathy   PSYCH:  mentation appears normal and affect normal/bright    MUSCULOSKELETAL:  Normal gait  Symmetrical radially deviated DIP joint of the little fingers  Left little finger PIP joint is swollen  He does have full flexion\joint extension of the little finger is lacking by about 4 degrees  No rotational deformities noted  Mild tenderness is noted at the PIP joint  Extensor mechanism is " otherwise intact  Sensation is grossly intact  Circulation is intact    ASSESSMENT:  Left little finger PIP joint sprain  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  The images from October 9, 2019 were visualized.  To address his flexion contracture issue, spring splint will be provided.  How to use it and when to use it were explained.  I want him to wear the splint for the next 3 weeks especially at night.  During the day, he can resume range of motion activities as tolerated.  He was also explained to him that swelling and discomfort to me linger for months.  If he has any concerns in 2 months, he will come back for follow-up.    Imaging Interpretation:   None taken today    Darrell Kahn MD  Department of Orthopedic Surgery        Disclaimer: This note consists of symbols derived from keyboarding, dictation and/or voice recognition software. As a result, there may be errors in the script that have gone undetected. Please consider this when interpreting information found in this chart.    Again, thank you for allowing me to participate in the care of your patient.        Sincerely,        Darrell Kahn MD

## 2019-09-16 NOTE — PROGRESS NOTES
HISTORY OF PRESENT ILLNESS:    Yan Young is a 54 year old male who is seen for evaluation of the left little finger injury sustained in early part of August 2019.  He was playing basketball and his finger got jammed.  He did not really think much of it from having had similar injuries in the past.  He was in fact able to golf next day.  Since then he has noted persisting swelling which was felt to be lasting longer than he anticipated.  Pain was mild but tolerable.  He came in to the urgent care on September 9, 2019.  X-rays were taken.  There was felt to be a small avulsion fracture and for that reason he is here for further evaluation.  He has not had any significant numbness or tingling sensation.  He was given a splint at the time that he was seen at the urgent care.  He is right-hand dominant.    Present symptoms: Swelling and mild discomfort  Treatments tried to this point: None charge for that they avoid suspect splint in a closed  Orthopedic PMH: History of knee pain    Past Medical History:   Diagnosis Date     Hypertension        Past Surgical History:   Procedure Laterality Date     LAPAROSCOPIC HERNIORRHAPHY INGUINAL Right 12/3/2018    Procedure: Laparoscopic Convert to Open Right Inguinal Hernia Repair with Mesh;  Surgeon: Alexa Enriquez MD;  Location: [facility] OR       Family History   Problem Relation Age of Onset     Pancreatic Cancer Father      Arthritis Sister        Social History     Socioeconomic History     Marital status:      Spouse name: Not on file     Number of children: Not on file     Years of education: Not on file     Highest education level: Not on file   Occupational History     Not on file   Social Needs     Financial resource strain: Not on file     Food insecurity:     Worry: Not on file     Inability: Not on file     Transportation needs:     Medical: Not on file     Non-medical: Not on file   Tobacco Use     Smoking status: Never Smoker     Smokeless tobacco:  Never Used   Substance and Sexual Activity     Alcohol use: Yes     Comment: minimal     Drug use: No     Sexual activity: Not on file   Lifestyle     Physical activity:     Days per week: Not on file     Minutes per session: Not on file     Stress: Not on file   Relationships     Social connections:     Talks on phone: Not on file     Gets together: Not on file     Attends Rastafari service: Not on file     Active member of club or organization: Not on file     Attends meetings of clubs or organizations: Not on file     Relationship status: Not on file     Intimate partner violence:     Fear of current or ex partner: Not on file     Emotionally abused: Not on file     Physically abused: Not on file     Forced sexual activity: Not on file   Other Topics Concern     Parent/sibling w/ CABG, MI or angioplasty before 65F 55M? No   Social History Narrative     Not on file       Current Outpatient Medications   Medication Sig Dispense Refill     lisinopril (PRINIVIL/ZESTRIL) 10 MG tablet Take 1 tablet (10 mg) by mouth daily 90 tablet 3     acetaminophen (TYLENOL) 325 MG tablet Take 2 tablets (650 mg) by mouth every 4 hours as needed for other or pain (multimodal surgical pain management along with NSAIDS and opioid medication as indicated based on pain control and physical function.) (Patient not taking: Reported on 9/9/2019) 100 tablet 1     oxyCODONE (ROXICODONE) 5 MG tablet Take 1 tablet (5 mg) by mouth every 6 hours as needed for breakthrough pain (Take one tablet as needed every 6 hours for breakthrough pain if pain poorly controlled with tylenol alone) (Patient not taking: Reported on 12/19/2018) 15 tablet 0     senna-docusate (SENOKOT-S/PERICOLACE) 8.6-50 MG tablet Take 1 tablet by mouth 2 times daily as needed for constipation (Please take for stool softening regimen while on oxycodone) (Patient not taking: Reported on 12/19/2018) 100 tablet 0       No Known Allergies    REVIEW OF SYSTEMS:  CONSTITUTIONAL:   "NEGATIVE for fever, chills, change in weight  INTEGUMENTARY/SKIN:  NEGATIVE for worrisome rashes, moles or lesions  EYES:  NEGATIVE for vision changes or irritation  ENT/MOUTH:  NEGATIVE for ear, mouth and throat problems  RESP:  NEGATIVE for significant cough or SOB  BREAST:  NEGATIVE for masses, tenderness or discharge  CV:  NEGATIVE for chest pain, palpitations or peripheral edema  GI:  NEGATIVE for nausea, abdominal pain, heartburn, or change in bowel habits  :  Negative   MUSCULOSKELETAL:  See HPI above  NEURO:  NEGATIVE for weakness, dizziness or paresthesias  ENDOCRINE:  NEGATIVE for temperature intolerance, skin/hair changes  HEME/ALLERGY/IMMUNE:  NEGATIVE for bleeding problems  PSYCHIATRIC:  NEGATIVE for changes in mood or affect      PHYSICAL EXAM:  BP (!) 160/98 (BP Location: Right arm, Patient Position: Chair, Cuff Size: Adult Regular)   Ht 1.6 m (5' 3\")   Wt 68 kg (150 lb)   BMI 26.57 kg/m    Body mass index is 26.57 kg/m .   GENERAL APPEARANCE: healthy, alert and no distress   HEENT: No apparent thyroid megaly. Clear sclera with normal ocular movement  RESPIRATORY: No labored breathing  SKIN: no suspicious lesions or rashes  NEURO: Normal strength and tone, mentation intact and speech normal  VASCULAR: Good pulses, and capillary refill   LYMPH: no lymphadenopathy   PSYCH:  mentation appears normal and affect normal/bright    MUSCULOSKELETAL:  Normal gait  Symmetrical radially deviated DIP joint of the little fingers  Left little finger PIP joint is swollen  He does have full flexion\  PIP joint extension of the little finger is lacking by about 4 degrees  No rotational deformities noted  Mild tenderness is noted at the PIP joint  Extensor mechanism is otherwise intact  Sensation is grossly intact  Circulation is intact    ASSESSMENT:  Left little finger PIP joint sprain  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  The images " from October 9, 2019 were visualized.  To address his flexion contracture issue, spring splint will be provided.  How to use it and when to use it were explained.  I want him to wear the splint for the next 3 weeks especially at night.  During the day, he can resume range of motion activities as tolerated.  He was also explained to him that swelling and discomfort to me linger for months.  If he has any concerns in 2 months, he will come back for follow-up.    Imaging Interpretation:   None taken today    Darrell Kahn MD  Department of Orthopedic Surgery        Disclaimer: This note consists of symbols derived from keyboarding, dictation and/or voice recognition software. As a result, there may be errors in the script that have gone undetected. Please consider this when interpreting information found in this chart.

## 2019-10-01 ENCOUNTER — OFFICE VISIT (OUTPATIENT)
Dept: FAMILY MEDICINE | Facility: CLINIC | Age: 54
End: 2019-10-01
Payer: COMMERCIAL

## 2019-10-01 VITALS
WEIGHT: 155.4 LBS | OXYGEN SATURATION: 99 % | TEMPERATURE: 97.9 F | BODY MASS INDEX: 27.54 KG/M2 | DIASTOLIC BLOOD PRESSURE: 83 MMHG | RESPIRATION RATE: 16 BRPM | HEART RATE: 74 BPM | HEIGHT: 63 IN | SYSTOLIC BLOOD PRESSURE: 141 MMHG

## 2019-10-01 DIAGNOSIS — S62.633D CLOSED DISPLACED FRACTURE OF DISTAL PHALANX OF LEFT MIDDLE FINGER WITH ROUTINE HEALING, SUBSEQUENT ENCOUNTER: ICD-10-CM

## 2019-10-01 DIAGNOSIS — Z00.00 ROUTINE GENERAL MEDICAL EXAMINATION AT A HEALTH CARE FACILITY: Primary | ICD-10-CM

## 2019-10-01 DIAGNOSIS — I10 UNCONTROLLED HYPERTENSION: ICD-10-CM

## 2019-10-01 DIAGNOSIS — I10 ESSENTIAL HYPERTENSION WITH GOAL BLOOD PRESSURE LESS THAN 140/90: ICD-10-CM

## 2019-10-01 DIAGNOSIS — L72.0 EIC (EPIDERMAL INCLUSION CYST): ICD-10-CM

## 2019-10-01 LAB
ANION GAP SERPL CALCULATED.3IONS-SCNC: 8 MMOL/L (ref 3–14)
BASOPHILS # BLD AUTO: 0 10E9/L (ref 0–0.2)
BASOPHILS NFR BLD AUTO: 0.6 %
BUN SERPL-MCNC: 18 MG/DL (ref 7–30)
CALCIUM SERPL-MCNC: 9 MG/DL (ref 8.5–10.1)
CHLORIDE SERPL-SCNC: 104 MMOL/L (ref 94–109)
CHOLEST SERPL-MCNC: 278 MG/DL
CO2 SERPL-SCNC: 25 MMOL/L (ref 20–32)
CREAT SERPL-MCNC: 1.04 MG/DL (ref 0.66–1.25)
CREAT UR-MCNC: 60 MG/DL
DIFFERENTIAL METHOD BLD: ABNORMAL
EOSINOPHIL # BLD AUTO: 0.2 10E9/L (ref 0–0.7)
EOSINOPHIL NFR BLD AUTO: 3.8 %
ERYTHROCYTE [DISTWIDTH] IN BLOOD BY AUTOMATED COUNT: 12.3 % (ref 10–15)
GFR SERPL CREATININE-BSD FRML MDRD: 80 ML/MIN/{1.73_M2}
GLUCOSE SERPL-MCNC: 117 MG/DL (ref 70–99)
HCT VFR BLD AUTO: 50.4 % (ref 40–53)
HDLC SERPL-MCNC: 51 MG/DL
HGB BLD-MCNC: 17.4 G/DL (ref 13.3–17.7)
LDLC SERPL CALC-MCNC: 183 MG/DL
LYMPHOCYTES # BLD AUTO: 1.5 10E9/L (ref 0.8–5.3)
LYMPHOCYTES NFR BLD AUTO: 29.1 %
MCH RBC QN AUTO: 31.9 PG (ref 26.5–33)
MCHC RBC AUTO-ENTMCNC: 34.5 G/DL (ref 31.5–36.5)
MCV RBC AUTO: 92 FL (ref 78–100)
MICROALBUMIN UR-MCNC: <5 MG/L
MICROALBUMIN/CREAT UR: NORMAL MG/G CR (ref 0–17)
MONOCYTES # BLD AUTO: 0.8 10E9/L (ref 0–1.3)
MONOCYTES NFR BLD AUTO: 16.1 %
NEUTROPHILS # BLD AUTO: 2.5 10E9/L (ref 1.6–8.3)
NEUTROPHILS NFR BLD AUTO: 50.4 %
NONHDLC SERPL-MCNC: 227 MG/DL
PLATELET # BLD AUTO: 136 10E9/L (ref 150–450)
POTASSIUM SERPL-SCNC: 4.3 MMOL/L (ref 3.4–5.3)
RBC # BLD AUTO: 5.46 10E12/L (ref 4.4–5.9)
SODIUM SERPL-SCNC: 137 MMOL/L (ref 133–144)
TRIGL SERPL-MCNC: 218 MG/DL
WBC # BLD AUTO: 5 10E9/L (ref 4–11)

## 2019-10-01 PROCEDURE — 82043 UR ALBUMIN QUANTITATIVE: CPT | Performed by: NURSE PRACTITIONER

## 2019-10-01 PROCEDURE — 99396 PREV VISIT EST AGE 40-64: CPT | Performed by: NURSE PRACTITIONER

## 2019-10-01 PROCEDURE — 36415 COLL VENOUS BLD VENIPUNCTURE: CPT | Performed by: NURSE PRACTITIONER

## 2019-10-01 PROCEDURE — 99213 OFFICE O/P EST LOW 20 MIN: CPT | Mod: 25 | Performed by: NURSE PRACTITIONER

## 2019-10-01 PROCEDURE — 80061 LIPID PANEL: CPT | Performed by: NURSE PRACTITIONER

## 2019-10-01 PROCEDURE — 80048 BASIC METABOLIC PNL TOTAL CA: CPT | Performed by: NURSE PRACTITIONER

## 2019-10-01 PROCEDURE — 85025 COMPLETE CBC W/AUTO DIFF WBC: CPT | Performed by: NURSE PRACTITIONER

## 2019-10-01 RX ORDER — LISINOPRIL 20 MG/1
20 TABLET ORAL DAILY
Qty: 90 TABLET | Refills: 0 | Status: SHIPPED | OUTPATIENT
Start: 2019-10-01 | End: 2020-01-07

## 2019-10-01 RX ORDER — LISINOPRIL 10 MG/1
10 TABLET ORAL DAILY
Qty: 90 TABLET | Refills: 3 | Status: CANCELLED | OUTPATIENT
Start: 2019-10-01

## 2019-10-01 SDOH — HEALTH STABILITY: MENTAL HEALTH: HOW OFTEN DO YOU HAVE A DRINK CONTAINING ALCOHOL?: MONTHLY OR LESS

## 2019-10-01 ASSESSMENT — ENCOUNTER SYMPTOMS
HEMATOCHEZIA: 0
ARTHRALGIAS: 0
HEMATURIA: 0
PARESTHESIAS: 0
EYE PAIN: 0
MYALGIAS: 0
PALPITATIONS: 0
NERVOUS/ANXIOUS: 0
CONSTIPATION: 0
NAUSEA: 0
FREQUENCY: 0
JOINT SWELLING: 0
SORE THROAT: 0
HEADACHES: 0
DIZZINESS: 0
WEAKNESS: 0
DIARRHEA: 0
SHORTNESS OF BREATH: 0
COUGH: 0
CHILLS: 0
FEVER: 0
ABDOMINAL PAIN: 0
HEARTBURN: 0
DYSURIA: 0

## 2019-10-01 ASSESSMENT — MIFFLIN-ST. JEOR: SCORE: 1430.02

## 2019-10-01 NOTE — PROGRESS NOTES
"SUBJECTIVE:   CC: Yan Young is an 54 year old male who presents for preventative health visit.     Healthy Habits:     Getting at least 3 servings of Calcium per day:  Yes    Bi-annual eye exam:  Yes    Dental care twice a year:  Yes    Sleep apnea or symptoms of sleep apnea:  None    Diet:  Regular (no restrictions)    Frequency of exercise:  2-3 days/week    Duration of exercise:  Greater than 60 minutes    Taking medications regularly:  Yes    Medication side effects:  None    PHQ-2 Total Score: 0    Additional concerns today:  No        Epidermal cyst on back on neck/occiput left side, had an ultrasound 1/28/19. Benign per patient report. Would like it removed.     Right inguinal hernia repair 12/3/18 with postop respiratory distress and overnight observation. Had pain for about a week after surgery and then the pain resolved. No pain or concerns since. Does not do any heavy lifting.     Fracture of left 5th finger, no surgical intervention per Ortho. Was wearing a brace/splint until yesterday but lost it. No CMS changes.    \"The orthopedist told me not to worry about it and it will heal without difficulty.\"    Hypertension  Does not follow a low sodium diet.   Gets regular exercise, likes to bike  Doesn't routinely check BP at home  Takes lisinopril as prescribed, denies lightheadedness, dizziness, chest pain, or SOB  Increased stress at work, uses exercise for stress management  \"I have not been eating as well as I should\", follows a regular diet.   Sees an ophthalmologist annually    Today's PHQ-2 Score:   PHQ-2 ( 1999 [employer]) 10/1/2019   Q1: Little interest or pleasure in doing things 0   Q2: Feeling down, depressed or hopeless 0   PHQ-2 Score 0   Q1: Little interest or pleasure in doing things Not at all   Q2: Feeling down, depressed or hopeless Not at all   PHQ-2 Score 0       Abuse: Current or Past(Physical, Sexual or Emotional)- No  Do you feel safe in your environment? Yes    Social History "     Tobacco Use     Smoking status: Former Smoker     Packs/day: 0.00     Types: Cigarettes     Smokeless tobacco: Never Used     Tobacco comment: quit by 30 years   Substance Use Topics     Alcohol use: Yes     Frequency: Monthly or less     Comment: minimal, beer     If you drink alcohol do you typically have >3 drinks per day or >7 drinks per week? No    Alcohol Use 10/1/2019   Prescreen: >3 drinks/day or >7 drinks/week? No   Prescreen: >3 drinks/day or >7 drinks/week? -       Last PSA: No results found for: PSA    Reviewed orders with patient. Reviewed health maintenance and updated orders accordingly - Yes  Lab work is in process  Labs reviewed in EPIC  BP Readings from Last 3 Encounters:   10/01/19 (!) 141/83   19 (!) 160/98   19 136/82    Wt Readings from Last 3 Encounters:   10/01/19 70.5 kg (155 lb 6.4 oz)   19 68 kg (150 lb)   19 68 kg (150 lb)                  Patient Active Problem List   Diagnosis     Postoperative respiratory distress     S/P inguinal hernia repair     EIC (epidermal inclusion cyst)     Essential hypertension with goal blood pressure less than 140/90     Past Surgical History:   Procedure Laterality Date     LAPAROSCOPIC HERNIORRHAPHY INGUINAL Right 12/3/2018    Procedure: Laparoscopic Convert to Open Right Inguinal Hernia Repair with Mesh;  Surgeon: Alexa Enriquez MD;  Location: [facility] OR     left knee surgery      surgery in his 20s, laproscopic       Social History     Tobacco Use     Smoking status: Former Smoker     Packs/day: 0.00     Types: Cigarettes     Smokeless tobacco: Never Used     Tobacco comment: quit by 30 years   Substance Use Topics     Alcohol use: Yes     Frequency: Monthly or less     Comment: minimal, beer     Family History   Problem Relation Age of Onset     Pancreatic Cancer Father          at 53 yrs     Arthritis Sister          Current Outpatient Medications   Medication Sig Dispense Refill     lisinopril (PRINIVIL/ZESTRIL)  "20 MG tablet Take 1 tablet (20 mg) by mouth daily 90 tablet 0     No Known Allergies    Reviewed and updated as needed this visit by clinical staff  Tobacco  Allergies  Meds  Problems  Med Hx  Surg Hx  Fam Hx  Soc Hx          Reviewed and updated as needed this visit by Provider  Tobacco  Allergies  Meds  Problems  Med Hx  Surg Hx  Fam Hx          Review of Systems   Constitutional: Negative for chills and fever.   HENT: Negative for congestion, ear pain, hearing loss and sore throat.    Eyes: Negative for pain and visual disturbance.   Respiratory: Negative for cough and shortness of breath.    Cardiovascular: Negative for chest pain, palpitations and peripheral edema.   Gastrointestinal: Negative for abdominal pain, constipation, diarrhea, heartburn, hematochezia and nausea.   Genitourinary: Negative for discharge, dysuria, frequency, genital sores, hematuria, impotence and urgency.   Musculoskeletal: Negative for arthralgias, joint swelling and myalgias.   Skin: Negative for rash.   Neurological: Negative for dizziness, weakness, headaches and paresthesias.   Psychiatric/Behavioral: Negative for mood changes. The patient is not nervous/anxious.      OBJECTIVE:   BP (!) 141/83   Pulse 74   Temp 97.9  F (36.6  C)   Resp 16   Ht 1.6 m (5' 3\")   Wt 70.5 kg (155 lb 6.4 oz)   SpO2 99%   BMI 27.53 kg/m    BP recheck 142/90    Physical Exam  GENERAL: healthy, alert and no distress  EYES: Eyes grossly normal to inspection, PERRL and conjunctivae and sclerae normal  HENT: ear canals and TM's normal, nose and mouth without ulcers or lesions  NECK: no adenopathy, no asymmetry, masses, or scars and thyroid normal to palpation  RESP: lungs clear to auscultation - no rales, rhonchi or wheezes  CV: regular rate and rhythm, normal S1 S2, no S3 or S4, no murmur, click or rub, no peripheral edema and peripheral pulses strong  ABDOMEN: soft, nontender, no hepatosplenomegaly, no masses and bowel sounds " normal  MS: no gross musculoskeletal defects noted, no edema  SKIN: no suspicious lesions or rashes. He has an approximate 1.5cm  Diameter rubbery subcutaneous painless mass at the base of his skull/upper neck/hairline area.   NEURO: Normal strength and tone, mentation intact and speech normal  PSYCH: mentation appears normal, affect normal/bright    Labs reviewed in Epic    ASSESSMENT/PLAN:   (Z00.00) Routine general medical examination at a health care facility  (primary encounter diagnosis)  Comment: Routine exam, hypertension uncontrolled, otherwise healthy. Hernia repair (right), no pain or concerns.   Plan: Lipid panel reflex to direct LDL Fasting, CBC         with platelets and differential        Hx elevated lipids and glucose, may need to follow up with A1C  Encouraged dietary modifications and continue with exercise routine. He is very active, plays hockey and bikes    (I10) Uncontrolled hypertension  Comment: BP 140s/80-90s today, initial and recheck. Past BPs uncontrolled despite exercise.   Plan: Basic metabolic panel, Albumin Random Urine         Quantitative with Creat Ratio, lisinopril         (PRINIVIL/ZESTRIL) 20 MG tablet        Increase lisinopril from 10mg per day to 20mg per. Labs pending/check kidney function. Discussed dietary modifications and stress management. Return to clinic in 3 months for BP recheck and medication tolerance. Call if light headed, dizzy, CP, or SOB or other unusual symptoms.     (I10) Essential hypertension with goal blood pressure less than 140/90  Comment: Uncontrolled, /83  Plan: Basic metabolic panel, Albumin Random Urine         Quantitative with Creat Ratio, lisinopril         (PRINIVIL/ZESTRIL) 20 MG tablet        Check BP at home using home monitor.   See above.     (L72.0) EIC (epidermal inclusion cyst)  Comment: History of left occipital cyst, had US on 1/28/19. Non-painful but bothersome when he wears a helmet. Would like it removed.   Plan: GENERAL  "SURG ADULT REFERRAL        Be seen by general surgery for a consultation and treatment recommendations/likely surgery for this mass.      (W46.890R) Closed displaced fracture of distal phalanx of left middle finger with routine healing, subsequent encounter  Comment: No acute changes, CMS intact. Deformity noted but is able to perform AROM. Had a splint but lost it  Plan: No change to current plan of care. Continue to use finger and perform AROM as tolerated. No surgery per Ortho but follow up with ortho if any difficulty.     COUNSELING:   Reviewed preventive health counseling, as reflected in patient instructions       Regular exercise       Healthy diet/nutrition       Vision screening- just had check up        Hearing screening       Immunizations    Declined: Influenza and Zoster         Estimated body mass index is 27.53 kg/m  as calculated from the following:    Height as of this encounter: 1.6 m (5' 3\").    Weight as of this encounter: 70.5 kg (155 lb 6.4 oz).     Weight management plan: Discussed healthy diet and exercise guidelines     reports that he has quit smoking. His smoking use included cigarettes. He smoked 0.00 packs per day. He has never used smokeless tobacco.     Return to Clinic in 3 months for blood pressure check. Check your blood pressure at home.       Counseling Resources:  ATP IV Guidelines  Pooled Cohorts Equation Calculator  FRAX Risk Assessment  ICSI Preventive Guidelines  Dietary Guidelines for Americans, 2010  USDA's MyPlate  ASA Prophylaxis  Lung CA Screening    JACKLYN Hernandez [facility]  "

## 2019-10-01 NOTE — PATIENT INSTRUCTIONS
Patient Education     Established High Blood Pressure    High blood pressure (hypertension) is a chronic disease. Often, healthcare providers don t know what causes it. But it can be caused by certain health conditions and medicines.  If you have high blood pressure, you may not have any symptoms. If you do have symptoms, they may include headache, dizziness, changes in your vision, chest pain, and shortness of breath. But even without symptoms, high blood pressure that s not treated raises your risk for heart attack, heart failure, and stroke. High blood pressure is a serious health risk and shouldn t be ignored.  Blood pressure measurements are given as 2 numbers. Systolic blood pressure is the upper number. This is the pressure when the heart contracts. Diastolic blood pressure is the lower number. This is the pressure when the heart relaxes between beats. You will see your blood pressure readings written together. For example, a person with a systolic pressure of 118 and a diastolic pressure of 78 will have 118/78 written in the medical record.  Blood pressure is categorized as normal, elevated, or stage 1 or stage 2 high blood pressure:    Normal blood pressure is systolic of less than 120 and diastolic of less than 80 (120/80)    Elevated blood pressure is systolic of 120 to 129 and diastolic less than 80    Stage 1 high blood pressure is systolic is 130 to 139 or diastolic between 80 to 89    Stage 2 high blood pressure is when systolic is 140 or higher or the diastolic is 90 or higher  Home care  If you have high blood pressure, follow these home care guidelines to help lower your blood pressure. If you are taking medicines for high blood pressure, these methods may reduce or end your need for medicines in the future.    Start a weight-loss program if you are overweight.    Cut back on how much salt you get in your diet. Here s how to do this:  ? Don t eat foods that have a lot of salt. These include olives,  pickles, smoked meats, and salted potato chips.  ? Don t add salt to your food at the table.  ? Use only small amounts of salt when cooking.    Start an exercise program. Talk with your healthcare provider about the type of exercise program that would be best for you. It doesn't have to be hard. Even brisk walking for 20 minutes 3 times a week is a good form of exercise.    Don t take medicines that stimulate the heart. This includes many over-the-counter cold and sinus decongestant pills and sprays, as well as diet pills. Check the warnings about high blood pressure on the label. Before buying any over-the-counter medicines or supplements, always ask the pharmacist about the product's potential interaction with your high blood pressure and your high blood pressure medicines.    Stimulants such as amphetamine or cocaine could be deadly for someone with high blood pressure. Never take these.    Limit how much caffeine you get in your diet. Switch to caffeine-free products.    Stop smoking. If you are a long-time smoker, this can be hard. Talk to your healthcare provider about medicines and nicotine replacement options to help you. Also, enroll in a stop-smoking program to make it more likely that you will quit for good.    Learn how to handle stress. This is an important part of any program to lower blood pressure. Learn about relaxation methods like meditation, yoga, or biofeedback.    If your provider prescribed medicines, take them exactly as directed. Missing doses may cause your blood pressure get out of control.    If you miss a dose or doses, check with your healthcare provider or pharmacist about what to do.    Consider buying an automatic blood pressure machine to check your blood pressure at home. Ask your provider for a recommendation. You can get one of these at most pharmacies.     The American Heart Association recommends the following guidelines for home blood pressure monitoring:    Don't smoke or  drink coffee for 30 minutes before taking your blood pressure.    Go to the bathroom before the test.    Relax for 5 minutes before taking the measurement.    Sit with your back supported (don't sit on a couch or soft chair); keep your feet on the floor uncrossed. Place your arm on a solid flat surface (like a table) with the upper part of the arm at heart level. Place the middle of the cuff directly above the bend of the elbow. Check the monitor's instruction manual for an illustration.    Take multiple readings. When you measure, take 2 to 3 readings one minute apart and record all of the results.    Take your blood pressure at the same time every day, or as your healthcare provider recommends.    Record the date, time, and blood pressure reading.    Take the record with you to your next medical appointment. If your blood pressure monitor has a built-in memory, simply take the monitor with you to your next appointment.    Call your provider if you have several high readings. Don't be frightened by a single high blood pressure reading, but if you get several high readings, check in with your healthcare provider.    Note: When blood pressure reaches a systolic (top number) of 180 or higher OR diastolic (bottom number) of 110 or higher, seek emergency medical treatment.  Follow-up care  You will need to see your healthcare provider regularly. This is to check your blood pressure and to make changes to your medicines. Make a follow-up appointment as directed. Bring the record of your home blood pressure readings to the appointment.  When to seek medical advice  Call your healthcare provider right away if any of these occur:    Blood pressure reaches a systolic (upper number) of 180 or higher OR a diastolic (bottom number) of 110 or higher    Chest pain or shortness of breath    Severe headache    Throbbing or rushing sound in the ears    Nosebleed    Sudden severe pain in your belly (abdomen)    Extreme drowsiness,  confusion, or fainting    Dizziness or spinning sensation (vertigo)    Weakness of an arm or leg or one side of the face    You have problems speaking or seeing   Date Last Reviewed: 12/1/2016 2000-2018 [employer]. [address] [place] [address], [place] [address]. All rights reserved. This information is not intended as a substitute for professional medical care. Always follow your healthcare professional's instructions.         Patient Education     Low-Salt Choices  Eating salt (sodium) can make your body retain too much water. Excess water makes your heart work harder. Canned, packaged, and frozen foods are easy to prepare. But they are often high in sodium. Here are some ideas for low-salt foods you can easily make yourself.    For breakfast    Fruit or 100% fruit juice    Whole-wheat bread or an English muffin. Look for sodium content on Nutrition Facts labels.    Low-fat milk or yogurt    Unsalted eggs    Shredded wheat    Corn tortillas    Unsalted steamed rice    Regular (not instant) hot cereal, made without salt  Stay away from:    Sausage, trotter, and ham    Flour tortillas    Packaged muffins, pancakes, and biscuits    Instant hot cereals    Cottage cheese  For lunch and dinner    Fresh fish, chicken, turkey, or meat--baked, broiled, or roasted without salt    Dry beans, cooked without salt    Tofu, stir-fried without salt    Unsalted fresh fruit and vegetables, or frozen or canned fruit and vegetables with no added salt  Stay away from:    Lunch or deli meat that is cured or smoked    Cheese    Tomato juice and ketchup    Canned vegetables, soups, and fish not labeled as no-salt-added or reduced sodium    Packaged gravies and sauces    Olives, pickles, and relish    Bottled salad dressings  For snacks and desserts    Yogurt    Unsalted, air-popped popcorn    Unsalted nuts or seeds  Stay away from:    Pies and cakes    Packaged dessert mixes    Pizza    Canned and packaged puddings    Pretzels,  chips, crackers, and nuts--unless the label says unsalted  Date Last Reviewed: 6/1/2017 2000-2018 [employer]. [address] [place] [address], [place] [address]. All rights reserved. This information is not intended as a substitute for professional medical care. Always follow your healthcare professional's instructions.

## 2019-10-03 ENCOUNTER — TELEPHONE (OUTPATIENT)
Dept: FAMILY MEDICINE | Facility: CLINIC | Age: 54
End: 2019-10-03

## 2019-10-03 NOTE — TELEPHONE ENCOUNTER
Lisbeth completed Health screening form. LM for patient to call back to see what he wants us to do with forms. There is a portion for Dental screening not completed. Forms in [facility] nurse basket.    Juliet Freeman

## 2019-10-04 NOTE — TELEPHONE ENCOUNTER
Spoke with patient's spouse Dora who had the same set of forms. Writer faxed forms per her request for the both of them.     Juliet Freeman

## 2019-10-08 PROBLEM — D69.6 TEMPORARY LOW PLATELET COUNT (H): Status: ACTIVE | Noted: 2019-10-08

## 2019-10-08 NOTE — RESULT ENCOUNTER NOTE
Yan,    This note is to let you know the results of your recent lab studies.      On your basic metabolic panel, your electrolytes, kidney function, liver function, and calcium levels are normal.  Your blood sugar did come up above the normal limit of 99 at 117.  With this, I recommend that you eat smaller portions, less carbohydrates and sugars, increase aerobic activity, and I would like to recheck your blood sugar in 3 to 6 months.  At that time, I would like to do additional testing to check for type 2 diabetes.  Let me know if you have any questions about this otherwise I will plan to see you 2 months.    On your cholesterol panel, your total cholesterol, bad fat of triglycerides, and LDL cholesterol levels are all elevated.  Similarly to the elevated blood sugar diet recommendations, I recommend that you eat less carbohydrates, increase aerobic activity, maintain a healthy weight, and this time also add a low-fat diet.  When you come back into the clinic to see me for your blood sugar follow-up, I can recheck your cholesterol panel at that time.    If there is anything else that I can do for you, please do not hesitate to let me know.    Lisbeth VILLASENOR CNP

## 2019-10-29 ENCOUNTER — TELEPHONE (OUTPATIENT)
Dept: SURGERY | Facility: CLINIC | Age: 54
End: 2019-10-29

## 2019-10-29 ENCOUNTER — PREP FOR PROCEDURE (OUTPATIENT)
Dept: SURGERY | Facility: CLINIC | Age: 54
End: 2019-10-29

## 2019-10-29 ENCOUNTER — TELEPHONE (OUTPATIENT)
Dept: FAMILY MEDICINE | Facility: CLINIC | Age: 54
End: 2019-10-29

## 2019-10-29 ENCOUNTER — OFFICE VISIT (OUTPATIENT)
Dept: SURGERY | Facility: CLINIC | Age: 54
End: 2019-10-29
Payer: COMMERCIAL

## 2019-10-29 VITALS
HEIGHT: 63 IN | RESPIRATION RATE: 16 BRPM | HEART RATE: 104 BPM | WEIGHT: 155 LBS | BODY MASS INDEX: 27.46 KG/M2 | SYSTOLIC BLOOD PRESSURE: 146 MMHG | DIASTOLIC BLOOD PRESSURE: 88 MMHG | OXYGEN SATURATION: 96 %

## 2019-10-29 DIAGNOSIS — R22.0 SCALP MASS: Primary | ICD-10-CM

## 2019-10-29 DIAGNOSIS — L72.9 SCALP CYST: Primary | ICD-10-CM

## 2019-10-29 PROCEDURE — 99243 OFF/OP CNSLTJ NEW/EST LOW 30: CPT | Performed by: SURGERY

## 2019-10-29 ASSESSMENT — MIFFLIN-ST. JEOR: SCORE: 1428.21

## 2019-10-29 NOTE — TELEPHONE ENCOUNTER
Patient does have office visit scheduled with Dr. Hyacinth Banda tomorrow 10/30/2019    Called patient, left detailed message on identified voicemail informing patient he needs additional office visit for pre-op. Advised patient to plan to keep appointment tomorrow and return call to clinic with additional questions    Thank You!  Roselia Veronica, RN  Triage Nurse

## 2019-10-29 NOTE — TELEPHONE ENCOUNTER
Reason for Call:  Other    Detailed comments: Patient is having surgery on Thursday, 10/31/2019 and is wondering if his visit from 10/1/2019 will suffice for this or not. Writer does see mention of surgery and a referral from that visit but nothing specific stating cleared for surgery. Patient is scheduled tomorrow, 10/30/2019 at 10:20 AM just in case. Please inform. Thanks!    Phone Number Patient can be reached at: Home number on file [phone] (home)    Best Time: ASAP    Can we leave a detailed message on this number? YES    Call taken on 10/29/2019 at 3:46 PM by Johanne Armenta

## 2019-10-29 NOTE — PROGRESS NOTES
Assessment:   Yan Young is a 54 year old male seen in consultation for epidermal inclusion cyst at the request of Lenny Parson MD.    2.5 cm left occiput epidermal inclusion cyst    Plan:  We have discussed the options of observation and excision.  He elects excisional biopsy.  We discussed the indications, alternatives, and risks.  We discussed scarring, numbness, anesthesia, infection, bleeding, and recurrence.  Due to the size and location of the cyst, I will perform this in the operating room rather than a clinic setting.     We will schedule surgery at the patient's convenience.    HPI:  Yan Young is a 54 year old male presents today for a left posterior scalp epidermal cyst. He noticed it several years ago and it has been slowly growing in size. It's now large enough that it bothers him when he is putting on a snow-mobile helmet, etc.     Duration:  3-5 years   H/o trauma:  No  Drainage:  No  Previous infections:  No  Other such masses now or in the past:  No  Family h/o similar masses:  No  Pain:  No  Size:  slowly increasing    Past Medical History:   has a past medical history of Hypertension.    Past Surgical History:  Past Surgical History:   Procedure Laterality Date     LAPAROSCOPIC HERNIORRHAPHY INGUINAL Right 12/3/2018    Procedure: Laparoscopic Convert to Open Right Inguinal Hernia Repair with Mesh;  Surgeon: Alexa Enriquez MD;  Location: [facility] OR     left knee surgery      surgery in his 20s, laproscopic        Social History:  Social History     Socioeconomic History     Marital status:      Spouse name: Not on file     Number of children: Not on file     Years of education: Not on file     Highest education level: Not on file   Occupational History     Not on file   Social Needs     Financial resource strain: Not on file     Food insecurity:     Worry: Not on file     Inability: Not on file     Transportation needs:     Medical: Not on file     Non-medical:  Not on file   Tobacco Use     Smoking status: Former Smoker     Packs/day: 0.00     Types: Cigarettes     Smokeless tobacco: Never Used     Tobacco comment: quit by 30 years   Substance and Sexual Activity     Alcohol use: Yes     Frequency: Monthly or less     Comment: minimal, beer     Drug use: No     Sexual activity: Not on file   Lifestyle     Physical activity:     Days per week: Not on file     Minutes per session: Not on file     Stress: Not on file   Relationships     Social connections:     Talks on phone: Not on file     Gets together: Not on file     Attends Rastafarian service: Not on file     Active member of club or organization: Not on file     Attends meetings of clubs or organizations: Not on file     Relationship status: Not on file     Intimate partner violence:     Fear of current or ex partner: Not on file     Emotionally abused: Not on file     Physically abused: Not on file     Forced sexual activity: Not on file   Other Topics Concern     Parent/sibling w/ CABG, MI or angioplasty before 65F 55M? No   Social History Narrative     Not on file        Family History:  Family History   Problem Relation Age of Onset     Pancreatic Cancer Father          at 53 yrs     Arthritis Sister      Family history reviewed and not pertinent.    ROS:  The 10 point review of systems is negative other than noted in the HPI and above.    PE:  There were no vitals taken for this visit.  General appearance: well-developed, well-nourished, no apparent distress  HEENT:  Head normocephalic and atraumatic, pupils equal and round, conjunctivae clear, mucous membranes moist, external ears and nose normal  Lungs: respirations unlabored  Musculoskeletal:  Normal station and gait  Extremities: warm, without edema  Neurologic: alert, speech is clear, moves all extremities with good strength  Psychiatric: Mood and affect are appropriate  Skin: There is a 2.5 cm mobile subcutaneous mass on the left occiput near the hair  line.  The overlying skin is normal.  It is non-tender.      This note was created using voice recognition software. Undetected word substitutions or other errors may have occurred.     Time spent with the patient with greater that 50% of the time in discussion was 25 minutes.     Shanna Faith MD    Please route or send letter to:  Primary Care Provider (PCP) and Referring Provider

## 2019-10-29 NOTE — LETTER
[id number]    RE: Yan Young, : 1965      Assessment:   Yan Young is a 54 year old male seen in consultation for epidermal inclusion cyst at the request of Lenny Parson MD.     2.5 cm left occiput epidermal inclusion cyst     Plan:  We have discussed the options of observation and excision.  He elects excisional biopsy.  We discussed the indications, alternatives, and risks.  We discussed scarring, numbness, anesthesia, infection, bleeding, and recurrence.  Due to the size and location of the cyst, I will perform this in the operating room rather than a clinic setting.      We will schedule surgery at the patient's convenience.     HPI:  Yan Young is a 54 year old male presents today for a left posterior scalp epidermal cyst. He noticed it several years ago and it has been slowly growing in size. It's now large enough that it bothers him when he is putting on a snow-mobile helmet, etc.      Duration:  3-5 years   H/o trauma:  No  Drainage:  No  Previous infections:  No  Other such masses now or in the past:  No  Family h/o similar masses:  No  Pain:  No  Size:  slowly increasing     Past Medical History:  Has a past medical history of Hypertension.     Family history reviewed and not pertinent.     ROS:  The 10 point review of systems is negative other than noted in the HPI and above.     PE:  There were no vitals taken for this visit.  General appearance: well-developed, well-nourished, no apparent distress  HEENT:  Head normocephalic and atraumatic, pupils equal and round, conjunctivae clear, mucous membranes moist, external ears and nose normal  Lungs: respirations unlabored  Musculoskeletal:  Normal station and gait  Extremities: warm, without edema  Neurologic: alert, speech is clear, moves all extremities with good strength  Psychiatric: Mood and affect are appropriate  Skin: There is a 2.5 cm mobile subcutaneous mass on the left occiput near the hair line.  The  overlying skin is normal.  It is non-tender.    .      Shanna Faith MD

## 2019-10-29 NOTE — TELEPHONE ENCOUNTER
Lisbeth Hart CNP,  Please see phone message below - scheduled for scalp cyst removal on 10/31/2019    Writer assumes patient needs to schedule alternate appointment for pre-op visit, but wanted to make sure this was accurate    Please advise    Thank You!  Roselia Veronica, RN  Triage Nurse

## 2019-10-29 NOTE — TELEPHONE ENCOUNTER
Type of surgery: EXCISION OF LEFT OCCIPUT CYST   Location of surgery: [facility]  Date and time of surgery: 10/31/2019 @ 1:30 PM   Surgeon: Shanna Faith MD   Pre-Op Appt Date: PATIENT TO SCHEDULE    Post-Op Appt Date: PATIENT TO SCHEDULE     Packet sent out: Yes  PACKET AND SOAP GIVEN TO PATIENT    Pre-cert/Authorization completed:  Not Applicable  Date: 10/29/2019         EXCISION OF LEFT OCCIPUT CYST     MAC PT INST TO HAVE H&P WITH DR GRULLON   45 MIN REQ NON MGB NMS

## 2019-10-29 NOTE — TELEPHONE ENCOUNTER
He would need a pre op.  Can he come in tomorrow? I will squeeze him in any time except over lunch hour.  Earlier is better.

## 2019-10-30 ENCOUNTER — OFFICE VISIT (OUTPATIENT)
Dept: FAMILY MEDICINE | Facility: CLINIC | Age: 54
End: 2019-10-30
Payer: COMMERCIAL

## 2019-10-30 VITALS
WEIGHT: 155 LBS | DIASTOLIC BLOOD PRESSURE: 88 MMHG | RESPIRATION RATE: 18 BRPM | BODY MASS INDEX: 27.46 KG/M2 | SYSTOLIC BLOOD PRESSURE: 138 MMHG | HEART RATE: 66 BPM | OXYGEN SATURATION: 98 % | TEMPERATURE: 98.4 F

## 2019-10-30 DIAGNOSIS — J95.89: ICD-10-CM

## 2019-10-30 DIAGNOSIS — R06.03: ICD-10-CM

## 2019-10-30 DIAGNOSIS — L72.9 SCALP CYST: ICD-10-CM

## 2019-10-30 DIAGNOSIS — D69.6 TEMPORARY LOW PLATELET COUNT (H): ICD-10-CM

## 2019-10-30 DIAGNOSIS — Z01.818 PREOP GENERAL PHYSICAL EXAM: Primary | ICD-10-CM

## 2019-10-30 DIAGNOSIS — L72.0 EIC (EPIDERMAL INCLUSION CYST): ICD-10-CM

## 2019-10-30 DIAGNOSIS — I10 ESSENTIAL HYPERTENSION WITH GOAL BLOOD PRESSURE LESS THAN 140/90: ICD-10-CM

## 2019-10-30 LAB
ERYTHROCYTE [DISTWIDTH] IN BLOOD BY AUTOMATED COUNT: 12.1 % (ref 10–15)
HCT VFR BLD AUTO: 48.1 % (ref 40–53)
HGB BLD-MCNC: 16.6 G/DL (ref 13.3–17.7)
MCH RBC QN AUTO: 31.6 PG (ref 26.5–33)
MCHC RBC AUTO-ENTMCNC: 34.5 G/DL (ref 31.5–36.5)
MCV RBC AUTO: 91 FL (ref 78–100)
PLATELET # BLD AUTO: 130 10E9/L (ref 150–450)
RBC # BLD AUTO: 5.26 10E12/L (ref 4.4–5.9)
WBC # BLD AUTO: 5 10E9/L (ref 4–11)

## 2019-10-30 PROCEDURE — 85027 COMPLETE CBC AUTOMATED: CPT | Performed by: FAMILY MEDICINE

## 2019-10-30 PROCEDURE — 36415 COLL VENOUS BLD VENIPUNCTURE: CPT | Performed by: FAMILY MEDICINE

## 2019-10-30 PROCEDURE — 99214 OFFICE O/P EST MOD 30 MIN: CPT | Performed by: FAMILY MEDICINE

## 2019-10-30 ASSESSMENT — MIFFLIN-ST. JEOR: SCORE: 1428.21

## 2019-10-30 NOTE — RESULT ENCOUNTER NOTE
Omid Sorenson:  Nice to meet you today.  Your platelets continue to be in the low normal range.  Let's keep an eye on this yearly, and please let us know right away if you notice easy bruising or bleeding.    Good luck with your surgery tomorrow! Let me know if you have any further questions.  Best,  Dr. Hyacinth Banda MD/[facility]

## 2019-10-30 NOTE — PROGRESS NOTES
[address] [address]  [phone]  Dept: [phone]    PRE-OP EVALUATION:  Today's date: 10/30/2019    Yan Young (: 1965) presents for pre-operative evaluation assessment as requested by Dr. Sanchez.  He requires evaluation and anesthesia risk assessment prior to undergoing surgery/procedure for treatment of Excision Left Occiput Cyst .    Fax number for surgical facility: NA  Primary Physician: Hyacinth Banda  Type of Anesthesia Anticipated: General    Patient has a Health Care Directive or Living Will:  NO    Preop Questions 10/30/2019   Who is doing your surgery? B   What are you having done? cyst   Date of Surgery/Procedure: 10/31   Facility or Hospital where procedure/surgery will be performed: [facility]   1.  Do you have a history of Heart attack, stroke, stent, coronary bypass surgery, or other heart surgery? No   2.  Do you ever have any pain or discomfort in your chest? No   3.  Do you have a history of  Heart Failure? No   4.   Are you troubled by shortness of breath when:  walking on a level surface, or up a slight hill, or at night? No   5.  Do you currently have a cold, bronchitis or other respiratory infection? No   6.  Do you have a cough, shortness of breath, or wheezing? No   7.  Do you sometimes get pains in the calves of your legs when you walk? No   8. Do you or anyone in your family have previous history of blood clots? No   9.  Do you or does anyone in your family have a serious bleeding problem such as prolonged bleeding following surgeries or cuts? No   10. Have you ever had problems with anemia or been told to take iron pills? No   11. Have you had any abnormal blood loss such as black, tarry or bloody stools? No   12. Have you ever had a blood transfusion? No   13. Have you or any of your relatives ever had problems with anesthesia? YES - Difficulty waking from anesthesia.  Last time had to be sent to hospital.   14.  Do you have sleep apnea, excessive snoring or daytime drowsiness? No   15. Do you have any prosthetic heart valves? No   16. Do you have prosthetic joints? No         HPI:     HPI related to upcoming procedure: Cyst on neck, needs removal.      See problem list for active medical problems.  Problems all longstanding and stable, except as noted/documented.  See ROS for pertinent symptoms related to these conditions.    HYPERTENSION - Patient has longstanding history of HTN , currently denies any symptoms referable to elevated blood pressure. Specifically denies chest pain, palpitations, dyspnea, orthopnea, PND or peripheral edema. Blood pressure readings have been in normal range. Current medication regimen is as listed below. Patient denies any side effects of medication.       MEDICAL HISTORY:     Patient Active Problem List    Diagnosis Date Noted     Scalp cyst 10/29/2019     Priority: Medium     Added automatically from request for surgery [id number]       Temporary low platelet count (H) 10/08/2019     Priority: Medium     EIC (epidermal inclusion cyst) 10/01/2019     Priority: Medium     Essential hypertension with goal blood pressure less than 140/90 10/01/2019     Priority: Medium     Postoperative respiratory distress 12/03/2018     Priority: Medium     S/P inguinal hernia repair 12/03/2018     Priority: Medium      Past Medical History:   Diagnosis Date     Hypertension      Past Surgical History:   Procedure Laterality Date     LAPAROSCOPIC HERNIORRHAPHY INGUINAL Right 12/3/2018    Procedure: Laparoscopic Convert to Open Right Inguinal Hernia Repair with Mesh;  Surgeon: Alexa Enriquez MD;  Location: [facility] OR     left knee surgery      surgery in his 20s, laproscopic     Current Outpatient Medications   Medication Sig Dispense Refill     lisinopril (PRINIVIL/ZESTRIL) 20 MG tablet Take 1 tablet (20 mg) by mouth daily 90 tablet 0     OTC products: None, except as noted above, no recent use of OTC ASA,  NSAIDS or Steroids and no use of herbal medications or other supplements    No Known Allergies   Latex Allergy: NO    Social History     Tobacco Use     Smoking status: Former Smoker     Packs/day: 0.00     Types: Cigarettes     Smokeless tobacco: Never Used     Tobacco comment: quit by 30 years   Substance Use Topics     Alcohol use: Yes     Frequency: Monthly or less     Comment: minimal, beer     History   Drug Use No       REVIEW OF SYSTEMS:   CONSTITUTIONAL: NEGATIVE for fever, chills, change in weight  ENT/MOUTH: NEGATIVE for ear, mouth and throat problems  RESP: NEGATIVE for significant cough or SOB  CV: NEGATIVE for chest pain, palpitations or peripheral edema    EXAM:   /88   Pulse 66   Temp 98.4  F (36.9  C) (Temporal)   Resp 18   Wt 70.3 kg (155 lb)   SpO2 98%   BMI 27.46 kg/m    GENERAL APPEARANCE: healthy, alert and no distress  HENT: ear canals and TM's normal and nose and mouth without ulcers or lesions  RESP: lungs clear to auscultation - no rales, rhonchi or wheezes  CV: regular rate and rhythm, normal S1 S2, no S3 or S4 and no murmur, click or rub   ABDOMEN: soft, nontender, no HSM or masses and bowel sounds normal  NEURO: Normal strength and tone, sensory exam grossly normal, mentation intact and speech normal    DIAGNOSTICS:   No EKG required for low risk surgery (cataract, skin procedure, breast biopsy, etc).  We will recheck platelets, although low since 2018, to be sure clotting function okay.      Recent Labs   Lab Test 10/01/19  1051 12/04/18  0639   HGB 17.4 13.7   * 138*    139   POTASSIUM 4.3 3.9   CR 1.04 1.00        IMPRESSION:   Reason for surgery/procedure: Scalp Cyst  Diagnosis/reason for consult: Pre-op for removal of above    The proposed surgical procedure is considered LOW risk.    REVISED CARDIAC RISK INDEX  The patient has the following serious cardiovascular risks for perioperative complications such as (MI, PE, VFib and 3  AV Block):  No serious  cardiac risks  INTERPRETATION: 0 risks: Class I (very low risk - 0.4% complication rate)    The patient has the following additional risks for perioperative complications:    History of Postoperative Respiratory Distress - severe.      ICD-10-CM    1. Preop general physical exam Z01.818 CBC with platelets   2. Postoperative respiratory distress J95.89     R06.03    3. Scalp cyst L72.9    4. Temporary low platelet count (H) D69.6    5. Essential hypertension with goal blood pressure less than 140/90 I10    6. EIC (epidermal inclusion cyst) L72.0        RECOMMENDATIONS:         --Patient is to take all scheduled medications on the day of surgery EXCEPT for modifications listed below.    APPROVAL GIVEN to proceed with proposed procedure, without further diagnostic evaluation       Signed Electronically by: Hyacinth Banda MD    Copy of this evaluation report is provided to requesting physician.    [facility] Preop Guidelines    Revised Cardiac Risk Index

## 2019-10-31 ENCOUNTER — APPOINTMENT (OUTPATIENT)
Dept: SURGERY | Facility: PHYSICIAN GROUP | Age: 54
End: 2019-10-31
Payer: COMMERCIAL

## 2019-10-31 ENCOUNTER — ANESTHESIA EVENT (OUTPATIENT)
Dept: SURGERY | Facility: CLINIC | Age: 54
End: 2019-10-31
Payer: COMMERCIAL

## 2019-10-31 ENCOUNTER — ANESTHESIA (OUTPATIENT)
Dept: SURGERY | Facility: CLINIC | Age: 54
End: 2019-10-31
Payer: COMMERCIAL

## 2019-10-31 ENCOUNTER — HOSPITAL ENCOUNTER (OUTPATIENT)
Facility: CLINIC | Age: 54
Discharge: HOME | End: 2019-10-31
Attending: SURGERY | Admitting: SURGERY
Payer: COMMERCIAL

## 2019-10-31 VITALS
DIASTOLIC BLOOD PRESSURE: 97 MMHG | OXYGEN SATURATION: 96 % | SYSTOLIC BLOOD PRESSURE: 158 MMHG | BODY MASS INDEX: 28.17 KG/M2 | HEART RATE: 74 BPM | RESPIRATION RATE: 16 BRPM | HEIGHT: 63 IN | TEMPERATURE: 97.6 F | WEIGHT: 159 LBS

## 2019-10-31 DIAGNOSIS — L72.9 SCALP CYST: ICD-10-CM

## 2019-10-31 PROCEDURE — 25000125 ZZHC RX 250: Performed by: SURGERY

## 2019-10-31 PROCEDURE — 71000027 ZZH RECOVERY PHASE 2 EACH 15 MINS: Performed by: SURGERY

## 2019-10-31 PROCEDURE — 25000128 H RX IP 250 OP 636: Performed by: PHYSICIAN ASSISTANT

## 2019-10-31 PROCEDURE — 36000050 ZZH SURGERY LEVEL 2 1ST 30 MIN: Performed by: SURGERY

## 2019-10-31 PROCEDURE — 37000009 ZZH ANESTHESIA TECHNICAL FEE, EACH ADDTL 15 MIN: Performed by: SURGERY

## 2019-10-31 PROCEDURE — 37000008 ZZH ANESTHESIA TECHNICAL FEE, 1ST 30 MIN: Performed by: SURGERY

## 2019-10-31 PROCEDURE — 25000128 H RX IP 250 OP 636: Performed by: NURSE ANESTHETIST, CERTIFIED REGISTERED

## 2019-10-31 PROCEDURE — 36000052 ZZH SURGERY LEVEL 2 EA 15 ADDTL MIN: Performed by: SURGERY

## 2019-10-31 PROCEDURE — 88305 TISSUE EXAM BY PATHOLOGIST: CPT | Mod: 26 | Performed by: SURGERY

## 2019-10-31 PROCEDURE — 25800030 ZZH RX IP 258 OP 636: Performed by: ANESTHESIOLOGY

## 2019-10-31 PROCEDURE — 27210794 ZZH OR GENERAL SUPPLY STERILE: Performed by: SURGERY

## 2019-10-31 PROCEDURE — 25000125 ZZHC RX 250: Performed by: ANESTHESIOLOGY

## 2019-10-31 PROCEDURE — 40000306 ZZH STATISTIC PRE PROC ASSESS II: Performed by: SURGERY

## 2019-10-31 PROCEDURE — 25000128 H RX IP 250 OP 636: Performed by: SURGERY

## 2019-10-31 PROCEDURE — 11423 EXC H-F-NK-SP B9+MARG 2.1-3: CPT | Performed by: SURGERY

## 2019-10-31 PROCEDURE — 93010 ELECTROCARDIOGRAM REPORT: CPT | Performed by: INTERNAL MEDICINE

## 2019-10-31 PROCEDURE — 25000125 ZZHC RX 250: Performed by: NURSE ANESTHETIST, CERTIFIED REGISTERED

## 2019-10-31 PROCEDURE — 88305 TISSUE EXAM BY PATHOLOGIST: CPT | Performed by: SURGERY

## 2019-10-31 RX ORDER — IBUPROFEN 200 MG
600 TABLET ORAL EVERY 6 HOURS PRN
Qty: 100 TABLET | Refills: 0 | Status: SHIPPED | OUTPATIENT
Start: 2019-10-31 | End: 2023-08-29

## 2019-10-31 RX ORDER — ACETAMINOPHEN 325 MG/1
975 TABLET ORAL EVERY 6 HOURS PRN
Qty: 50 TABLET | Refills: 0 | Status: SHIPPED | OUTPATIENT
Start: 2019-10-31 | End: 2022-04-12

## 2019-10-31 RX ORDER — ONDANSETRON 2 MG/ML
4 INJECTION INTRAMUSCULAR; INTRAVENOUS EVERY 30 MIN PRN
Status: DISCONTINUED | OUTPATIENT
Start: 2019-10-31 | End: 2019-10-31 | Stop reason: HOSPADM

## 2019-10-31 RX ORDER — MEPERIDINE HYDROCHLORIDE 25 MG/ML
12.5 INJECTION INTRAMUSCULAR; INTRAVENOUS; SUBCUTANEOUS
Status: DISCONTINUED | OUTPATIENT
Start: 2019-10-31 | End: 2019-10-31 | Stop reason: HOSPADM

## 2019-10-31 RX ORDER — NALOXONE HYDROCHLORIDE 0.4 MG/ML
.1-.4 INJECTION, SOLUTION INTRAMUSCULAR; INTRAVENOUS; SUBCUTANEOUS
Status: DISCONTINUED | OUTPATIENT
Start: 2019-10-31 | End: 2019-10-31 | Stop reason: HOSPADM

## 2019-10-31 RX ORDER — CEFAZOLIN SODIUM 2 G/100ML
2 INJECTION, SOLUTION INTRAVENOUS
Status: COMPLETED | OUTPATIENT
Start: 2019-10-31 | End: 2019-10-31

## 2019-10-31 RX ORDER — ACETAMINOPHEN 325 MG/1
650 TABLET ORAL
Status: CANCELLED | OUTPATIENT
Start: 2019-10-31

## 2019-10-31 RX ORDER — LIDOCAINE 40 MG/G
CREAM TOPICAL
Status: DISCONTINUED | OUTPATIENT
Start: 2019-10-31 | End: 2019-10-31 | Stop reason: HOSPADM

## 2019-10-31 RX ORDER — OXYCODONE HYDROCHLORIDE 5 MG/1
10 TABLET ORAL
Status: CANCELLED | OUTPATIENT
Start: 2019-10-31

## 2019-10-31 RX ORDER — PROPOFOL 10 MG/ML
INJECTION, EMULSION INTRAVENOUS CONTINUOUS PRN
Status: DISCONTINUED | OUTPATIENT
Start: 2019-10-31 | End: 2019-10-31

## 2019-10-31 RX ORDER — ALBUTEROL SULFATE 0.83 MG/ML
2.5 SOLUTION RESPIRATORY (INHALATION) EVERY 4 HOURS PRN
Status: DISCONTINUED | OUTPATIENT
Start: 2019-10-31 | End: 2019-10-31 | Stop reason: HOSPADM

## 2019-10-31 RX ORDER — CEFAZOLIN SODIUM 1 G/3ML
1 INJECTION, POWDER, FOR SOLUTION INTRAMUSCULAR; INTRAVENOUS SEE ADMIN INSTRUCTIONS
Status: DISCONTINUED | OUTPATIENT
Start: 2019-10-31 | End: 2019-10-31 | Stop reason: HOSPADM

## 2019-10-31 RX ORDER — SODIUM CHLORIDE, SODIUM LACTATE, POTASSIUM CHLORIDE, CALCIUM CHLORIDE 600; 310; 30; 20 MG/100ML; MG/100ML; MG/100ML; MG/100ML
INJECTION, SOLUTION INTRAVENOUS CONTINUOUS
Status: DISCONTINUED | OUTPATIENT
Start: 2019-10-31 | End: 2019-10-31 | Stop reason: HOSPADM

## 2019-10-31 RX ORDER — FENTANYL CITRATE 50 UG/ML
INJECTION, SOLUTION INTRAMUSCULAR; INTRAVENOUS PRN
Status: DISCONTINUED | OUTPATIENT
Start: 2019-10-31 | End: 2019-10-31

## 2019-10-31 RX ORDER — ONDANSETRON 2 MG/ML
INJECTION INTRAMUSCULAR; INTRAVENOUS PRN
Status: DISCONTINUED | OUTPATIENT
Start: 2019-10-31 | End: 2019-10-31

## 2019-10-31 RX ORDER — HYDROMORPHONE HYDROCHLORIDE 1 MG/ML
.3-.5 INJECTION, SOLUTION INTRAMUSCULAR; INTRAVENOUS; SUBCUTANEOUS EVERY 10 MIN PRN
Status: DISCONTINUED | OUTPATIENT
Start: 2019-10-31 | End: 2019-10-31 | Stop reason: HOSPADM

## 2019-10-31 RX ORDER — ONDANSETRON 4 MG/1
4 TABLET, ORALLY DISINTEGRATING ORAL EVERY 30 MIN PRN
Status: DISCONTINUED | OUTPATIENT
Start: 2019-10-31 | End: 2019-10-31 | Stop reason: HOSPADM

## 2019-10-31 RX ORDER — FENTANYL CITRATE 50 UG/ML
25-50 INJECTION, SOLUTION INTRAMUSCULAR; INTRAVENOUS EVERY 5 MIN PRN
Status: DISCONTINUED | OUTPATIENT
Start: 2019-10-31 | End: 2019-10-31 | Stop reason: HOSPADM

## 2019-10-31 RX ORDER — FENTANYL CITRATE 50 UG/ML
25-50 INJECTION, SOLUTION INTRAMUSCULAR; INTRAVENOUS
Status: DISCONTINUED | OUTPATIENT
Start: 2019-10-31 | End: 2019-10-31 | Stop reason: HOSPADM

## 2019-10-31 RX ADMIN — CEFAZOLIN SODIUM 2 G: 2 INJECTION, SOLUTION INTRAVENOUS at 13:14

## 2019-10-31 RX ADMIN — FENTANYL CITRATE 50 MCG: 50 INJECTION, SOLUTION INTRAMUSCULAR; INTRAVENOUS at 13:34

## 2019-10-31 RX ADMIN — MIDAZOLAM 2 MG: 1 INJECTION INTRAMUSCULAR; INTRAVENOUS at 13:08

## 2019-10-31 RX ADMIN — SODIUM CHLORIDE, POTASSIUM CHLORIDE, SODIUM LACTATE AND CALCIUM CHLORIDE: 600; 310; 30; 20 INJECTION, SOLUTION INTRAVENOUS at 12:54

## 2019-10-31 RX ADMIN — ONDANSETRON HYDROCHLORIDE 4 MG: 2 INJECTION, SOLUTION INTRAVENOUS at 13:20

## 2019-10-31 RX ADMIN — PROPOFOL 100 MCG/KG/MIN: 10 INJECTION, EMULSION INTRAVENOUS at 13:20

## 2019-10-31 RX ADMIN — LIDOCAINE HYDROCHLORIDE 25 MG: 10 INJECTION, SOLUTION EPIDURAL; INFILTRATION; INTRACAUDAL; PERINEURAL at 13:20

## 2019-10-31 ASSESSMENT — ENCOUNTER SYMPTOMS: DYSRHYTHMIAS: 0

## 2019-10-31 ASSESSMENT — MIFFLIN-ST. JEOR: SCORE: 1446.22

## 2019-10-31 NOTE — OP NOTE
General Surgery Operative Note      Pre-operative diagnosis: Left occiput subcutaneous mass   Post-operative diagnosis: Same    Procedure: Excision of left occiput subcutaneous mass    Surgeon: Shanna Faith MD   Assistant(s): NONE   Anesthesia: Local with MAC    Estimated blood loss: 5 cc     Specimens: ID Type Source Tests Collected by Time Destination   A : Left Occiput Cyst Tissue Neck SURGICAL PATHOLOGY EXAM Shanna Faith MD 10/31/2019  1:39 PM         The left occiput was prepped and draped in standard sterile fashion.  The skin overlying the mass was anesthetized with local anesthetic.  An approximately 2.5 cm was made over the cyst.  The incision was carried into the subcutaneous tissue and the mass was completely excised from surrounding tissues using a combination of Bovie electrocautery and blunt dissection.  The mass, which measured approximately 2.5 cm, was then passed off the field as specimen.  Hemostasis was maintained throughout with electrocautery.  The wound was then irrigated with sterile saline and closed with interrupted 3-0 vicryll and running 4-0 monocryl subcuticular sutures and dermabond.  The patient tolerated the procedure well.  Sponge and needle counts were correct at the end of the case.     Shanna Faith MD

## 2019-10-31 NOTE — PROGRESS NOTES
Dr. Rice aware of pt's BP. Ok for pt to go home and take lisinopril he takes at home and did not take this am. Pt and wife state understanding and are in agreement with this plan.

## 2019-10-31 NOTE — DISCHARGE INSTRUCTIONS
GENERAL ANESTHESIA OR SEDATION ADULT DISCHARGE INSTRUCTIONS   SPECIAL PRECAUTIONS FOR 24 HOURS AFTER SURGERY    IT IS NOT UNUSUAL TO FEEL LIGHT-HEADED OR FAINT, UP TO 24 HOURS AFTER SURGERY OR WHILE TAKING PAIN MEDICATION.  IF YOU HAVE THESE SYMPTOMS; SIT FOR A FEW MINUTES BEFORE STANDING AND HAVE SOMEONE ASSIST YOU WHEN YOU GET UP TO WALK OR USE THE BATHROOM.    YOU SHOULD REST AND RELAX FOR THE NEXT 24 HOURS AND YOU MUST MAKE ARRANGEMENTS TO HAVE SOMEONE STAY WITH YOU FOR AT LEAST 24 HOURS AFTER YOUR DISCHARGE.  AVOID HAZARDOUS AND STRENUOUS ACTIVITIES.  DO NOT MAKE IMPORTANT DECISIONS FOR 24 HOURS.    DO NOT DRIVE ANY VEHICLE OR OPERATE MECHANICAL EQUIPMENT FOR 24 HOURS FOLLOWING THE END OF YOUR SURGERY.  EVEN THOUGH YOU MAY FEEL NORMAL, YOUR REACTIONS MAY BE AFFECTED BY THE MEDICATION YOU HAVE RECEIVED.    DO NOT DRINK ALCOHOLIC BEVERAGES FOR 24 HOURS FOLLOWING YOUR SURGERY.    DRINK CLEAR LIQUIDS (APPLE JUICE, GINGER ALE, 7-UP, BROTH, ETC.).  PROGRESS TO YOUR REGULAR DIET AS YOU FEEL ABLE.    YOU MAY HAVE A DRY MOUTH, A SORE THROAT, MUSCLES ACHES OR TROUBLE SLEEPING.  THESE SHOULD GO AWAY AFTER 24 HOURS.    CALL YOUR DOCTOR FOR ANY OF THE FOLLOWING:  SIGNS OF INFECTION (FEVER, GROWING TENDERNESS AT THE SURGERY SITE, A LARGE AMOUNT OF DRAINAGE OR BLEEDING, SEVERE PAIN, FOUL-SMELLING DRAINAGE, REDNESS OR SWELLING.  IT HAS BEEN OVER 8 TO 10 HOURS SINCE SURGERY AND YOU ARE STILL NOT ABLE TO URINATE (PASS WATER).

## 2019-10-31 NOTE — ANESTHESIA CARE TRANSFER NOTE
Patient: Yan Hannah    Procedure(s):  EXCISION LEFT OCCIPUT CYST    Diagnosis: Scalp cyst [L72.9]  Diagnosis Additional Information: No value filed.    Anesthesia Type:   MAC     Note:  Airway :Room Air  Patient transferred to:Phase II  Handoff Report: Identifed the Patient, Identified the Reponsible Provider, Reviewed the pertinent medical history, Discussed the surgical course, Reviewed Intra-OP anesthesia mangement and issues during anesthesia, Set expectations for post-procedure period and Allowed opportunity for questions and acknowledgement of understanding      Vitals: (Last set prior to Anesthesia Care Transfer)    CRNA VITALS  10/31/2019 1327 - 10/31/2019 1357      10/31/2019             NIBP:  144/87    Pulse:  74    NIBP Mean:  108    SpO2:  96 %                Electronically Signed By: JACKLYN Gurrola CRNA  October 31, 2019  1:57 PM

## 2019-10-31 NOTE — ANESTHESIA PREPROCEDURE EVALUATION
Anesthesia Pre-Procedure Evaluation    Patient: Yan Young   MRN: [id number] : 1965          Preoperative Diagnosis: Scalp cyst [L72.9]    Procedure(s):  EXCISION LEFT OCCIPUT CYST    Past Medical History:   Diagnosis Date     Complication of anesthesia     difficulty waking, low sats with general anesthesia     Hypertension      Past Surgical History:   Procedure Laterality Date     LAPAROSCOPIC HERNIORRHAPHY INGUINAL Right 12/3/2018    Procedure: Laparoscopic Convert to Open Right Inguinal Hernia Repair with Mesh;  Surgeon: Alexa Enriquez MD;  Location: [facility] OR     left knee surgery      surgery in his 20s, laproscopic     Anesthesia Evaluation     . Pt has had prior anesthetic. Type: General    History of anesthetic complications    Significant respiratory distress following a GA requiring hospitalization       ROS/MED HX    ENT/Pulmonary:  - neg pulmonary ROS     Neurologic:  - neg neurologic ROS     Cardiovascular:     (+) hypertension----. : . . . :. .      (-) CAD, syncope and arrhythmias   METS/Exercise Tolerance:     Hematologic:  - neg hematologic  ROS      (-) anemia   Musculoskeletal:  - neg musculoskeletal ROS       GI/Hepatic:  - neg GI/hepatic ROS      (-) GERD   Renal/Genitourinary:  - ROS Renal section negative       Endo:  - neg endo ROS       Psychiatric:  - neg psychiatric ROS       Infectious Disease:  - neg infectious disease ROS       Malignancy:      - no malignancy   Other:                          Physical Exam  Normal systems: cardiovascular, pulmonary and dental    Airway   Mallampati: II  TM distance: >3 FB  Neck ROM: full    Dental     Cardiovascular       Pulmonary             Lab Results   Component Value Date    WBC 5.0 10/30/2019    HGB 16.6 10/30/2019    HCT 48.1 10/30/2019     (L) 10/30/2019     10/01/2019    POTASSIUM 4.3 10/01/2019    CHLORIDE 104 10/01/2019    CO2 25 10/01/2019    BUN 18 10/01/2019    CR 1.04 10/01/2019     (H) 10/01/2019  "   KATIE 9.0 10/01/2019    ALBUMIN 4.3 09/25/2018    PROTTOTAL 8.1 09/25/2018    ALT 70 09/25/2018    AST 35 09/25/2018    ALKPHOS 69 09/25/2018    BILITOTAL 1.0 09/25/2018       Preop Vitals  BP Readings from Last 3 Encounters:   10/30/19 138/88   10/29/19 (!) 146/88   10/01/19 (!) 141/83    Pulse Readings from Last 3 Encounters:   10/30/19 66   10/29/19 104   10/01/19 74      Resp Readings from Last 3 Encounters:   10/30/19 18   10/29/19 16   10/01/19 16    SpO2 Readings from Last 3 Encounters:   10/30/19 98%   10/29/19 96%   10/01/19 99%      Temp Readings from Last 1 Encounters:   10/30/19 98.4  F (36.9  C) (Temporal)    Ht Readings from Last 1 Encounters:   10/31/19 1.6 m (5' 2.99\")      Wt Readings from Last 1 Encounters:   10/31/19 72.1 kg (159 lb)    Estimated body mass index is 28.17 kg/m  as calculated from the following:    Height as of this encounter: 1.6 m (5' 2.99\").    Weight as of this encounter: 72.1 kg (159 lb).       Anesthesia Plan      History & Physical Review  History and physical reviewed and following examination; no interval change.    ASA Status:  2 .    NPO Status:  > 8 hours    Plan for MAC with Intravenous induction. Maintenance will be TIVA.  Reason for MAC:  Deep or markedly invasive procedure (G8)  PONV prophylaxis:  Ondansetron (or other 5HT-3) and Dexamethasone or Solumedrol       Postoperative Care  Postoperative pain management:  IV analgesics.      Consents  Anesthetic plan, risks, benefits and alternatives discussed with:  Patient..                 Otis Banda MD                    .  "

## 2019-10-31 NOTE — ANESTHESIA POSTPROCEDURE EVALUATION
Patient: Yan Mystic    Procedure(s):  EXCISION LEFT OCCIPUT CYST    Diagnosis:Scalp cyst [L72.9]  Diagnosis Additional Information: No value filed.    Anesthesia Type:  MAC    Note:  Anesthesia Post Evaluation    Patient location during evaluation: Phase 2  Patient participation: Able to fully participate in evaluation  Level of consciousness: awake and alert  Pain management: adequate  Airway patency: patent  Cardiovascular status: acceptable  Respiratory status: acceptable  Hydration status: acceptable  PONV: controlled             Last vitals:  Vitals:    10/31/19 1355 10/31/19 1359 10/31/19 1401   BP: (!) 160/102 (!) 163/107 (!) 158/102   Resp: 16     Temp: 96.8  F (36  C)     SpO2: 95%           Electronically Signed By: Otis Banda MD  October 31, 2019  2:15 PM

## 2019-11-01 LAB
COPATH REPORT: NORMAL
INTERPRETATION ECG - MUSE: NORMAL

## 2020-03-10 ENCOUNTER — HEALTH MAINTENANCE LETTER (OUTPATIENT)
Age: 55
End: 2020-03-10

## 2020-03-30 ENCOUNTER — MYC REFILL (OUTPATIENT)
Dept: FAMILY MEDICINE | Facility: CLINIC | Age: 55
End: 2020-03-30

## 2020-03-30 DIAGNOSIS — I10 ESSENTIAL HYPERTENSION WITH GOAL BLOOD PRESSURE LESS THAN 140/90: ICD-10-CM

## 2020-03-30 DIAGNOSIS — I10 UNCONTROLLED HYPERTENSION: ICD-10-CM

## 2020-04-02 RX ORDER — LISINOPRIL 20 MG/1
20 TABLET ORAL DAILY
Qty: 90 TABLET | Refills: 1 | Status: SHIPPED | OUTPATIENT
Start: 2020-04-02 | End: 2021-01-25

## 2020-04-02 NOTE — TELEPHONE ENCOUNTER
"LOV: 10/30/2020     \"Return in about 1 year (around 10/30/2020) for Preventative Annual Visit.\"    Thanks! Vanda Shaw RN      "

## 2020-09-28 ENCOUNTER — VIRTUAL VISIT (OUTPATIENT)
Dept: FAMILY MEDICINE | Facility: CLINIC | Age: 55
End: 2020-09-28
Payer: COMMERCIAL

## 2020-09-28 VITALS — SYSTOLIC BLOOD PRESSURE: 136 MMHG | DIASTOLIC BLOOD PRESSURE: 88 MMHG

## 2020-09-28 DIAGNOSIS — I10 ESSENTIAL HYPERTENSION WITH GOAL BLOOD PRESSURE LESS THAN 140/90: ICD-10-CM

## 2020-09-28 DIAGNOSIS — S49.92XD SHOULDER INJURY, LEFT, SUBSEQUENT ENCOUNTER: Primary | ICD-10-CM

## 2020-09-28 PROCEDURE — 99213 OFFICE O/P EST LOW 20 MIN: CPT | Mod: TEL | Performed by: FAMILY MEDICINE

## 2020-09-28 RX ORDER — LISINOPRIL 40 MG/1
40 TABLET ORAL DAILY
Qty: 90 TABLET | Refills: 1 | Status: SHIPPED | OUTPATIENT
Start: 2020-09-28 | End: 2021-04-07

## 2020-09-28 NOTE — PROGRESS NOTES
"Yan Young is a 55 year old male who is being evaluated via a billable telephone visit.      The patient has been notified of following:     \"This telephone visit will be conducted via a call between you and your physician/provider. We have found that certain health care needs can be provided without the need for a physical exam.  This service lets us provide the care you need with a short phone conversation.  If a prescription is necessary we can send it directly to your pharmacy.  If lab work is needed we can place an order for that and you can then stop by our lab to have the test done at a later time.    Telephone visits are billed at different rates depending on your insurance coverage. During this emergency period, for some insurers they may be billed the same as an in-person visit.  Please reach out to your insurance provider with any questions.    If during the course of the call the physician/provider feels a telephone visit is not appropriate, you will not be charged for this service.\"    Patient has given verbal consent for Telephone visit?  Yes    What phone number would you like to be contacted at? [phone] (H)    How would you like to obtain your AVS? Nicki Mcdaniel     Yan Young is a 55 year old male who presents via phone visit today for the following health issues:    HPI    He is wondering if he could switch his lisinopril to 30mg     Hypertension Follow-up      Do you check your blood pressure regularly outside of the clinic? Yes     Are you following a low salt diet? Yes    Are your blood pressures ever more than 140 on the top number (systolic) OR more   than 90 on the bottom number (diastolic), for example 140/90? Yes      How many servings of fruits and vegetables do you eat daily?  2-3    On average, how many sweetened beverages do you drink each day (Examples: soda, juice, sweet tea, etc.  Do NOT count diet or artificially sweetened beverages)?   0    How many days per " "week do you exercise enough to make your heart beat faster? 7    How many minutes a day do you exercise enough to make your heart beat faster? rides bike and plays golf     How many days per week do you miss taking your medication? 0     Lisinopril - does a lot of extreme exercise.  Pleased with what it's doing, but feels could be better.  Checks blood pressure at home, often:  140s-150s systolic.  85-95 diastolic.  Today 136/88.    Also - shoulder unloading at cabin in August. Went into urgency room.  They said shoulder impingement.  They recommended MRI.  Left shoulder. Now can raise above head but not full extension.    Review of Systems   Constitutional, HEENT, cardiovascular, pulmonary, gi and gu systems are negative, except as otherwise noted.       Objective   Vitals - Patient Reported  Weight (Patient Reported): 72.1 kg (159 lb)  Height (Patient Reported): 160 cm (5' 3\")  BMI (Based on Pt Reported Ht/Wt): 28.17      Vitals:  No vitals were obtained today due to virtual visit.    healthy, alert and no distress  PSYCH: Alert and oriented times 3; coherent speech, normal   rate and volume, able to articulate logical thoughts, able   to abstract reason, no tangential thoughts, no hallucinations   or delusions  His affect is normal  RESP: No cough, no audible wheezing, able to talk in full sentences  Remainder of exam unable to be completed due to telephone visits            Assessment/Plan:    Assessment & Plan     Yan was seen today for hypertension.    Diagnoses and all orders for this visit:    Shoulder injury, left, subsequent encounter  Comments:    Was seen in urgency room, thought to be impingement    They recommended MRI if no improvement    Is getting better but slowly    MRI ordered, if abnormal refer ortho  Orders:  -     MR Shoulder Left w/o Contrast; Future    Essential hypertension with goal blood pressure less than 140/90  Comments:    First step: switch to lisinopril 20 mg at " "night.    Continue exercise and low salt diet.    If BP not under 130 syst and 80 sebastian then increase to 40 mg.    Send me [employer] with blood pressure numbers in 1-2 weeks.  Orders:  -     lisinopril (ZESTRIL) 40 MG tablet; Take 1 tablet (40 mg) by mouth daily         BMI:   Estimated body mass index is 28.17 kg/m  as calculated from the following:    Height as of 10/31/19: 1.6 m (5' 2.99\").    Weight as of 10/31/19: 72.1 kg (159 lb).   Weight management plan: Discussed healthy diet and exercise guidelines      Return in about 1 week (around 10/5/2020) for [employer] message with BP numbers.    Hyacinth Banda MD  [facility]    Phone call duration:  12 minutes              "

## 2020-09-28 NOTE — PATIENT INSTRUCTIONS
Yan was seen today for hypertension.    Diagnoses and all orders for this visit:    Shoulder injury, left, subsequent encounter  Comments:    Was seen in urgency room, thought to be impingement    They recommended MRI if no improvement    Is getting better but slowly    MRI ordered, if abnormal refer ortho  Orders:  -     MR Shoulder Left w/o Contrast; Future    Essential hypertension with goal blood pressure less than 140/90  Comments:    First step: switch to lisinopril 20 mg at night.    Continue exercise and low salt diet.    If BP not under 130 syst and 80 sebastian then increase to 40 mg.    Send me mychart msg with blood pressure numbers in 1-2 weeks.  Orders:  -     lisinopril (ZESTRIL) 40 MG tablet; Take 1 tablet (40 mg) by mouth daily      Also: Please get your flu shot this year in September/October.  You can walk into any [facility] to have this done, or schedule at one of our flu clinics at [place] or [facility] ([phone]).

## 2020-11-19 ENCOUNTER — NURSE TRIAGE (OUTPATIENT)
Dept: NURSING | Facility: CLINIC | Age: 55
End: 2020-11-19

## 2020-11-20 NOTE — TELEPHONE ENCOUNTER
C/o mild chest pain, center of chest, feels tight. Pain is constant for past 3 wks. Denies SOB, dizziness, weakness, syncope, diaphoresis. Denies cardiac hx. Says he is an avid biker. Pt says his provider increased his lisinopril recently from 20 mg to 40 mg to get better BP control. Pt tried this but went back to 20 mg because he was having tachycardia w/ 40 mg dose. Advised call 911 now. Pt declined 911. Wanted to go to Urgency Room. Advised hospital ER instead. Pt agreed to go to ER.     Reason for Disposition    [1] Chest pain lasts > 5 minutes AND [2] described as crushing, pressure-like, or heavy    Additional Information    Negative: Severe difficulty breathing (e.g., struggling for each breath, speaks in single words)    Negative: Difficult to awaken or acting confused (e.g., disoriented, slurred speech)    Negative: Shock suspected (e.g., cold/pale/clammy skin, too weak to stand, low BP, rapid pulse)    Negative: [1] Chest pain lasts > 5 minutes AND [2] history of heart disease  (i.e., heart attack, bypass surgery, angina, angioplasty, CHF; not just a heart murmur)    Protocols used: CHEST PAIN-A-

## 2020-12-18 ENCOUNTER — HOSPITAL ENCOUNTER (OUTPATIENT)
Dept: ULTRASOUND IMAGING | Facility: CLINIC | Age: 55
Discharge: HOME | End: 2020-12-18
Attending: FAMILY MEDICINE | Admitting: FAMILY MEDICINE
Payer: COMMERCIAL

## 2020-12-18 DIAGNOSIS — E04.1 THYROID NODULE: ICD-10-CM

## 2020-12-18 PROCEDURE — 76536 US EXAM OF HEAD AND NECK: CPT

## 2020-12-18 NOTE — LETTER
December 22, 2020      Yan Young  [address] [place] [address]        Dear ,    We are writing to inform you of your test results.Please start E-visit to discuss these result, or could schedule phone or video visit.         Resulted Orders   US Thyroid    Narrative    US THYROID 12/18/2020 4:06 PM    CLINICAL HISTORY: 3.4 cm left thyroid nodule and 2.2 cm right thyroid  nodule seen on chest CT.  Thyroid nodule.    TECHNIQUE: Thyroid ultrasound.     COMPARISON: None.     FINDINGS:  RIGHT lobe: 5.1 x 2.4 x 2.0 cm. Heterogenous echotexture.  Isthmus: 6 mm.  LEFT lobe: 4.6 x 2.2 x 2.2 cm. Heterogenous echotexture.    NECK: No cervical lymphadenopathy.    NODULES:    Nodule 1: Right lower, 2.0 x 2.0 x 1.4 cm.   Composition: Solid or almost completely solid, 2 points   Echogenicity: Hyperechoic or isoechoic, 1 point   Shape: Wider-than-tall, 0 points   Margin: Smooth, 0 points   Echogenic Foci: Macrocalcifications, 1 point   Point Total: 4-6 points. TI-RADS 4. If 1.5 cm or larger, recommend  FNA; if 1 cm or larger, follow up US (annually for 5 years).      Nodule 2: Left upper, 2.2 x 1.9 x 0.9 cm.  Composition: Solid or almost completely solid, 2 points   Echogenicity: Hyperechoic or isoechoic, 1 point   Shape: Wider-than-tall, 0 points   Margin: Smooth, 0 points   Echogenic Foci: Macrocalcifications, 1 point   Point Total: 4-6 points. TI-RADS 4. If 1.5 cm or larger, recommend  FNA; if 1 cm or larger, follow up US (annually for 5 years).    Nodule 3: Left lower, 2.3 x 1.9 x 1.5 cm.  Composition: Solid or almost completely solid, 2 points   Echogenicity: Hyperechoic or isoechoic, 1 point   Shape: Wider-than-tall, 0 points   Margin: Smooth, 0 points   Echogenic Foci: None, or large comet-tail artifacts, 0 points   Point Total: 3 points. TI-RADS 3. If 2.5 cm or larger, recommend FNA;  if 1.5 cm or larger, recommend follow up US at 1, 3, and 5 years.      Impression    IMPRESSION: Thyroid  nodules as above.    Nodules are characterized per  ACR Thyroid Imaging, Reporting and Data System (TI-RADS): White Paper  of the ACR TI-RADS Committee  Ervin Shin. et al. Journal of the American College of  Radiology 2017. Volume 14 (2017), Issue 5, 642-602.     LAURIE OROZCO MD       If you have any questions or concerns, please call the clinic at the number listed above.       Sincerely,      Hyacinth Banda MD

## 2020-12-20 ENCOUNTER — HEALTH MAINTENANCE LETTER (OUTPATIENT)
Age: 55
End: 2020-12-20

## 2020-12-21 NOTE — RESULT ENCOUNTER NOTE
Please ask patient to start E-visit to discuss these result, or could schedule phone or video visit.  Dr. Hyacinth Banda MD / [facility]

## 2021-01-25 ENCOUNTER — VIRTUAL VISIT (OUTPATIENT)
Dept: FAMILY MEDICINE | Facility: CLINIC | Age: 56
End: 2021-01-25
Payer: COMMERCIAL

## 2021-01-25 VITALS — SYSTOLIC BLOOD PRESSURE: 126 MMHG | DIASTOLIC BLOOD PRESSURE: 59 MMHG

## 2021-01-25 DIAGNOSIS — R16.0 LIVER MASS: ICD-10-CM

## 2021-01-25 DIAGNOSIS — I10 ESSENTIAL HYPERTENSION WITH GOAL BLOOD PRESSURE LESS THAN 140/90: ICD-10-CM

## 2021-01-25 DIAGNOSIS — K76.0 HEPATIC STEATOSIS: ICD-10-CM

## 2021-01-25 DIAGNOSIS — E04.1 THYROID NODULE: Primary | ICD-10-CM

## 2021-01-25 DIAGNOSIS — Z12.11 SPECIAL SCREENING FOR MALIGNANT NEOPLASMS, COLON: ICD-10-CM

## 2021-01-25 PROBLEM — D69.6 TEMPORARY LOW PLATELET COUNT (H): Status: RESOLVED | Noted: 2019-10-08 | Resolved: 2021-01-25

## 2021-01-25 PROCEDURE — 99214 OFFICE O/P EST MOD 30 MIN: CPT | Mod: TEL | Performed by: FAMILY MEDICINE

## 2021-01-25 NOTE — PROGRESS NOTES
Delta is a 55 year old who is being evaluated via a billable telephone visit.      What phone number would you like to be contacted at? [phone] (M)   How would you like to obtain your AVS? MyChart  Assessment & Plan     Thyroid nodule    Noted incidentally on CT scan in Nov in emergency room.    3 nodules, all meet criteria for FNA    Also has some tenderness front of neck, improving    Plan: get thyroid function labs and FNA.  Will involve endocrinology as needed based on FNA results.  - US Biopsy Thyroid Fine Needle Aspiration; Future  - **TSH with free T4 reflex FUTURE anytime; Future  - ANTI THYROGLOBULIN ANTIBODY; Future  - THYROID PEROXIDASE ANTIBODY; Future    Hepatic steatosis    Noted on CT in emergency room 11/2020.    Patient looked this up, saw weight and alcohol played a part and quit drinking.    Has lost some weight, feeling good, no abdominal symptoms.    Recheck on liver MRI pending.    Liver mass    4 cm, uncharacterized, seen on CT in emergency room 11/2020    Ordered MRI  - MR Liver wo & w Contrast; Future    Special screening for malignant neoplasms, colon    Due for repeat.    Ordered.    Last in 2015, they recommended repeat 5 years  - GASTROENTEROLOGY ADULT REF PROCEDURE ONLY; Future    Essential hypertension with goal blood pressure less than 140/90    On lisinopril 40 mg daily.    Has quit drinking, lost some weight, today's BPs at goal.    Plan: if continues to feel well can trial 20 mg of lisinopril (half a tab) daily x 1 week, keep an eye on blood pressure.    If remains at goal can stay at 20 mg dosage, please send us [employer] message with this info and blood pressure measurements    Discussed with patient, all questions answered, in agreement with this plan, will return or seek further care if not improving or worsening.        Review of external notes as documented above   Review of the result(s) of each unique test - MRI abd - from ER 11/2020         Return in about 6 months  (around 7/25/2021) for Routine preventive, after getting FNA, lab tests, MRI, colonoscopy.    Hyacinth Banda MD  [facility]            Violeta Sorenson is a 55 year old who presents to clinic today for the following health issues     HPI     CT scan discussion   Lymph nodes     55 year old in emergency room in November for chest pain.  Heart checked out fine per patient, but CT scan showed:    1. No pulmonary embolus. 2. A 4.0 cm mass in segment 8 of the liver. Recommend a nonemergent magnetic resonance imaging examination of the liver for further characterization. 3. Hepatic steatosis. 4. A 3.4 cm left thyroid nodule. A 2.2 cm right thyroid nodule. Recommend a nonemergent thyroid ultrasound for further characterization. NOTE: ABNORMAL REPORT THE DICTATION ABOVE DESCRIBES AN ABNORMALITY FOR WHICH FOLLOW-UP IS NEEDED.     Thyroid was sore - now getting better.  Less and less over time, but still some tenderness front of neck.    Also - quit drinking in Nov.  Feeling better lost some weight.    Also - blood pressure at goal.  Taking 40 mg of lisnopril daily.  Woders if could lower dose?    blood pressure; today; usually takes it before pill.  126/59  Second: 115/62      Review of Systems   Constitutional, HEENT, cardiovascular, pulmonary, gi and gu systems are negative, except as otherwise noted.      Objective               Vitals:  No vitals were obtained today due to virtual visit.    Physical Exam   healthy, alert and no distress  PSYCH: Alert and oriented times 3; coherent speech, normal   rate and volume, able to articulate logical thoughts, able   to abstract reason, no tangential thoughts, no hallucinations   or delusions  His affect is normal  RESP: No cough, no audible wheezing, able to talk in full sentences  Remainder of exam unable to be completed due to telephone visits            Phone call duration: 21 minutes

## 2021-01-26 ENCOUNTER — TELEPHONE (OUTPATIENT)
Dept: GASTROENTEROLOGY | Facility: CLINIC | Age: 56
End: 2021-01-26

## 2021-01-26 DIAGNOSIS — Z11.59 ENCOUNTER FOR SCREENING FOR OTHER VIRAL DISEASES: Primary | ICD-10-CM

## 2021-01-26 NOTE — TELEPHONE ENCOUNTER
Left message for patient to call back to schedule colonoscopy.     Procedure: Lower Endoscopy    Lower Endoscopy Type: Colonoscopy    Purpose of Colonoscopy Procedure: Screening    Colonoscopy Sedation: Conscious/Moderate    Preferred Location: [facility]    Scheduling Instructions: If you have not heard from the scheduling office within 2 business days, please call [phone].      Dx: Special screening for malignant neoplasms, colon [Z12.11]

## 2021-02-04 ENCOUNTER — HOSPITAL ENCOUNTER (OUTPATIENT)
Dept: ULTRASOUND IMAGING | Facility: CLINIC | Age: 56
End: 2021-02-04
Attending: FAMILY MEDICINE
Payer: COMMERCIAL

## 2021-02-04 ENCOUNTER — HOSPITAL ENCOUNTER (OUTPATIENT)
Dept: LAB | Facility: CLINIC | Age: 56
End: 2021-02-04
Attending: FAMILY MEDICINE
Payer: COMMERCIAL

## 2021-02-04 DIAGNOSIS — E04.1 THYROID NODULE: ICD-10-CM

## 2021-02-04 LAB
BASOPHILS # BLD AUTO: 0 10E9/L (ref 0–0.2)
BASOPHILS NFR BLD AUTO: 0.6 %
DIFFERENTIAL METHOD BLD: NORMAL
EOSINOPHIL # BLD AUTO: 0.1 10E9/L (ref 0–0.7)
EOSINOPHIL NFR BLD AUTO: 2.3 %
ERYTHROCYTE [DISTWIDTH] IN BLOOD BY AUTOMATED COUNT: 11.6 % (ref 10–15)
HCT VFR BLD AUTO: 47.6 % (ref 40–53)
HGB BLD-MCNC: 16.5 G/DL (ref 13.3–17.7)
IMM GRANULOCYTES # BLD: 0 10E9/L (ref 0–0.4)
IMM GRANULOCYTES NFR BLD: 0.5 %
LYMPHOCYTES # BLD AUTO: 1.5 10E9/L (ref 0.8–5.3)
LYMPHOCYTES NFR BLD AUTO: 24.8 %
MCH RBC QN AUTO: 31.1 PG (ref 26.5–33)
MCHC RBC AUTO-ENTMCNC: 34.7 G/DL (ref 31.5–36.5)
MCV RBC AUTO: 90 FL (ref 78–100)
MONOCYTES # BLD AUTO: 0.6 10E9/L (ref 0–1.3)
MONOCYTES NFR BLD AUTO: 9.9 %
NEUTROPHILS # BLD AUTO: 3.8 10E9/L (ref 1.6–8.3)
NEUTROPHILS NFR BLD AUTO: 61.9 %
NRBC # BLD AUTO: 0 10*3/UL
NRBC BLD AUTO-RTO: 0 /100
PLATELET # BLD AUTO: 167 10E9/L (ref 150–450)
RBC # BLD AUTO: 5.3 10E12/L (ref 4.4–5.9)
TSH SERPL DL<=0.005 MIU/L-ACNC: 2.96 MU/L (ref 0.4–4)
WBC # BLD AUTO: 6.2 10E9/L (ref 4–11)

## 2021-02-04 PROCEDURE — 10005 FNA BX W/US GDN 1ST LES: CPT

## 2021-02-04 PROCEDURE — 999N001014 HC STATISTIC CYTO WRIGHT STAIN TC: Performed by: RADIOLOGY

## 2021-02-04 PROCEDURE — 86800 THYROGLOBULIN ANTIBODY: CPT | Performed by: FAMILY MEDICINE

## 2021-02-04 PROCEDURE — 86376 MICROSOMAL ANTIBODY EACH: CPT | Performed by: FAMILY MEDICINE

## 2021-02-04 PROCEDURE — 84443 ASSAY THYROID STIM HORMONE: CPT | Performed by: FAMILY MEDICINE

## 2021-02-04 PROCEDURE — 250N000009 HC RX 250: Performed by: RADIOLOGY

## 2021-02-04 PROCEDURE — 36415 COLL VENOUS BLD VENIPUNCTURE: CPT | Performed by: FAMILY MEDICINE

## 2021-02-04 PROCEDURE — 88173 CYTOPATH EVAL FNA REPORT: CPT | Mod: 26 | Performed by: PATHOLOGY

## 2021-02-04 PROCEDURE — 85025 COMPLETE CBC W/AUTO DIFF WBC: CPT | Performed by: FAMILY MEDICINE

## 2021-02-04 PROCEDURE — 88173 CYTOPATH EVAL FNA REPORT: CPT | Mod: TC | Performed by: RADIOLOGY

## 2021-02-04 RX ADMIN — LIDOCAINE HYDROCHLORIDE 10 ML: 10 INJECTION, SOLUTION EPIDURAL; INFILTRATION; INTRACAUDAL; PERINEURAL at 11:07

## 2021-02-04 NOTE — PROGRESS NOTES
Patient tolerated bilateral FNA well by Dr Magaña.  Samples obtained were collected and brought to lab.  Patient states understanding of discharge instructions, bandages remained clean dry and intact at time of discharge.  Sarah Hatch RN, BSN

## 2021-02-05 LAB
THYROGLOB AB SERPL IA-ACNC: <20 IU/ML (ref 0–40)
THYROPEROXIDASE AB SERPL-ACNC: 608 IU/ML

## 2021-02-06 PROBLEM — R76.8 ANTI-TPO ANTIBODIES PRESENT: Status: ACTIVE | Noted: 2021-02-06

## 2021-02-06 NOTE — RESULT ENCOUNTER NOTE
Omid Sorenson:  Your results show that your thyroid is functioning normally (TSH) but you do have a high amount of thyroid peroxidase antibody, which means that at some point in your life you likely will develop hypothyroidism.  We should check your TSH at your physical every year to be sure your thyroid is functioning normally, and if you develop signs of thyroid disease (hot or cold intolerance, weight gain or loss, fatigue) let us know and we can check it sooner.  Let me know if you have any questions about this.  I'm still watching for your biopsy results, it doesn't look like they've come in yet.  Best,  Dr. Hyacinth Banda MD/[facility]

## 2021-02-08 LAB — COPATH REPORT: NORMAL

## 2021-03-16 ENCOUNTER — HOSPITAL ENCOUNTER (OUTPATIENT)
Dept: GENERAL RADIOLOGY | Facility: CLINIC | Age: 56
End: 2021-03-16
Attending: FAMILY MEDICINE
Payer: COMMERCIAL

## 2021-03-16 ENCOUNTER — HOSPITAL ENCOUNTER (OUTPATIENT)
Dept: MRI IMAGING | Facility: CLINIC | Age: 56
End: 2021-03-16
Attending: FAMILY MEDICINE
Payer: COMMERCIAL

## 2021-03-16 DIAGNOSIS — R16.0 LIVER MASS: ICD-10-CM

## 2021-03-16 DIAGNOSIS — Z87.821 HISTORY OF FOREIGN BODY IN EYE: ICD-10-CM

## 2021-03-16 PROCEDURE — 70030 X-RAY EYE FOR FOREIGN BODY: CPT

## 2021-03-16 PROCEDURE — 74183 MRI ABD W/O CNTR FLWD CNTR: CPT

## 2021-03-16 PROCEDURE — 255N000002 HC RX 255 OP 636: Performed by: FAMILY MEDICINE

## 2021-03-16 PROCEDURE — A9585 GADOBUTROL INJECTION: HCPCS | Performed by: FAMILY MEDICINE

## 2021-03-16 RX ORDER — GADOBUTROL 604.72 MG/ML
7.5 INJECTION INTRAVENOUS ONCE
Status: COMPLETED | OUTPATIENT
Start: 2021-03-16 | End: 2021-03-16

## 2021-03-16 RX ADMIN — GADOBUTROL 7 ML: 604.72 INJECTION INTRAVENOUS at 10:43

## 2021-03-16 NOTE — RESULT ENCOUNTER NOTE
Good news Delta!  Nothing concerning on your liver MRI - just benign hemangioma and cysts.  Let me know if you have any questions about this.  Best,  Dr. Hyacinth Banda MD/[facility]

## 2021-04-05 DIAGNOSIS — I10 ESSENTIAL HYPERTENSION WITH GOAL BLOOD PRESSURE LESS THAN 140/90: ICD-10-CM

## 2021-04-07 RX ORDER — LISINOPRIL 40 MG/1
TABLET ORAL
Qty: 90 TABLET | Refills: 1 | Status: SHIPPED | OUTPATIENT
Start: 2021-04-07 | End: 2021-12-09

## 2021-04-07 NOTE — TELEPHONE ENCOUNTER
Last Comprehensive Metabolic Panel:  Sodium   Date Value Ref Range Status   10/01/2019 137 133 - 144 mmol/L Final     Potassium   Date Value Ref Range Status   10/01/2019 4.3 3.4 - 5.3 mmol/L Final     Chloride   Date Value Ref Range Status   10/01/2019 104 94 - 109 mmol/L Final     Carbon Dioxide   Date Value Ref Range Status   10/01/2019 25 20 - 32 mmol/L Final     Anion Gap   Date Value Ref Range Status   10/01/2019 8 3 - 14 mmol/L Final     Glucose   Date Value Ref Range Status   10/01/2019 117 (H) 70 - 99 mg/dL Final     Comment:     Fasting specimen     Urea Nitrogen   Date Value Ref Range Status   10/01/2019 18 7 - 30 mg/dL Final     Creatinine   Date Value Ref Range Status   10/01/2019 1.04 0.66 - 1.25 mg/dL Final     GFR Estimate   Date Value Ref Range Status   10/01/2019 80 >60 mL/min/[1.73_m2] Final     Comment:     Non  GFR Calc  Starting 12/18/2018, serum creatinine based estimated GFR (eGFR) will be   calculated using the Chronic Kidney Disease Epidemiology Collaboration   (CKD-EPI) equation.       Calcium   Date Value Ref Range Status   10/01/2019 9.0 8.5 - 10.1 mg/dL Final   Return in about 6 months (around 7/25/2021) for Routine preventive, after getting FNA, lab tests, MRI, colonoscopy.     Hyacinth Banda MD  [facility]

## 2021-04-07 NOTE — TELEPHONE ENCOUNTER
From 11/2020:  Basic Metabolic Panel  Order: [id number]  (suggestion)  Information displayed in this report will not trend or trigger automated decision support.    Ref Range & Units 4mo ago   Sodium 136 - 145 mmol/L 139    Potassium 3.5 - 5.0 mmol/L 4.3    Chloride 98 - 107 mmol/L 107    CO2 22 - 31 mmol/L 24    Anion Gap, Calculation 5 - 18 mmol/L 8    Glucose 70 - 125 mg/dL 117    Calcium 8.5 - 10.5 mg/dL 9.2    BUN 8 - 22 mg/dL 16    Creatinine 0.70 - 1.30 mg/dL 0.87    GFR MDRD Af Amer >60 mL/min/1.73m2 >60    GFR MDRD Non Af Amer >60 mL/min/1.73m2 >60

## 2021-05-25 ENCOUNTER — RECORDS - HEALTHEAST (OUTPATIENT)
Dept: ADMINISTRATIVE | Facility: CLINIC | Age: 56
End: 2021-05-25

## 2021-05-28 ENCOUNTER — RECORDS - HEALTHEAST (OUTPATIENT)
Dept: ADMINISTRATIVE | Facility: CLINIC | Age: 56
End: 2021-05-28

## 2021-10-03 ENCOUNTER — HEALTH MAINTENANCE LETTER (OUTPATIENT)
Age: 56
End: 2021-10-03

## 2021-12-08 DIAGNOSIS — I10 ESSENTIAL HYPERTENSION WITH GOAL BLOOD PRESSURE LESS THAN 140/90: ICD-10-CM

## 2021-12-09 RX ORDER — LISINOPRIL 40 MG/1
TABLET ORAL
Qty: 90 TABLET | Refills: 0 | Status: SHIPPED | OUTPATIENT
Start: 2021-12-09 | End: 2022-03-09

## 2021-12-09 NOTE — TELEPHONE ENCOUNTER
Medication is being filled for 1 time refill only due to:  Patient needs to be seen because it has been more than one year since last visit.     Team Coordinators: Please contact patient to set up annual physical for any further refills.     CORNELL Davidson RN  [facility]

## 2022-01-23 ENCOUNTER — HEALTH MAINTENANCE LETTER (OUTPATIENT)
Age: 57
End: 2022-01-23

## 2022-04-12 ENCOUNTER — OFFICE VISIT (OUTPATIENT)
Dept: FAMILY MEDICINE | Facility: CLINIC | Age: 57
End: 2022-04-12
Payer: COMMERCIAL

## 2022-04-12 VITALS
DIASTOLIC BLOOD PRESSURE: 101 MMHG | HEIGHT: 62 IN | SYSTOLIC BLOOD PRESSURE: 175 MMHG | OXYGEN SATURATION: 95 % | WEIGHT: 159 LBS | HEART RATE: 84 BPM | TEMPERATURE: 98.2 F | BODY MASS INDEX: 29.26 KG/M2

## 2022-04-12 DIAGNOSIS — E04.1 THYROID NODULE: ICD-10-CM

## 2022-04-12 DIAGNOSIS — R73.9 BLOOD GLUCOSE ELEVATED: ICD-10-CM

## 2022-04-12 DIAGNOSIS — I10 ESSENTIAL HYPERTENSION WITH GOAL BLOOD PRESSURE LESS THAN 140/90: ICD-10-CM

## 2022-04-12 DIAGNOSIS — Z00.00 ROUTINE HISTORY AND PHYSICAL EXAMINATION OF ADULT: ICD-10-CM

## 2022-04-12 DIAGNOSIS — M54.50 ACUTE LOW BACK PAIN WITHOUT SCIATICA, UNSPECIFIED BACK PAIN LATERALITY: ICD-10-CM

## 2022-04-12 DIAGNOSIS — Z12.11 SCREEN FOR COLON CANCER: ICD-10-CM

## 2022-04-12 DIAGNOSIS — Z13.220 LIPID SCREENING: ICD-10-CM

## 2022-04-12 DIAGNOSIS — Z12.5 SCREENING FOR PROSTATE CANCER: ICD-10-CM

## 2022-04-12 LAB
ALBUMIN SERPL-MCNC: 3.8 G/DL (ref 3.4–5)
ALP SERPL-CCNC: 88 U/L (ref 40–150)
ALT SERPL W P-5'-P-CCNC: 42 U/L (ref 0–70)
ANION GAP SERPL CALCULATED.3IONS-SCNC: 7 MMOL/L (ref 3–14)
AST SERPL W P-5'-P-CCNC: 23 U/L (ref 0–45)
BILIRUB SERPL-MCNC: 0.5 MG/DL (ref 0.2–1.3)
BUN SERPL-MCNC: 12 MG/DL (ref 7–30)
CALCIUM SERPL-MCNC: 9.4 MG/DL (ref 8.5–10.1)
CHLORIDE BLD-SCNC: 107 MMOL/L (ref 94–109)
CHOLEST SERPL-MCNC: 229 MG/DL
CO2 SERPL-SCNC: 24 MMOL/L (ref 20–32)
CREAT SERPL-MCNC: 0.91 MG/DL (ref 0.66–1.25)
CREAT UR-MCNC: 87 MG/DL
ERYTHROCYTE [DISTWIDTH] IN BLOOD BY AUTOMATED COUNT: 12.2 % (ref 10–15)
FASTING STATUS PATIENT QL REPORTED: YES
GFR SERPL CREATININE-BSD FRML MDRD: >90 ML/MIN/1.73M2
GLUCOSE BLD-MCNC: 121 MG/DL (ref 70–99)
HCT VFR BLD AUTO: 45.6 % (ref 40–53)
HDLC SERPL-MCNC: 40 MG/DL
HGB BLD-MCNC: 15.9 G/DL (ref 13.3–17.7)
LDLC SERPL CALC-MCNC: 151 MG/DL
MCH RBC QN AUTO: 30.8 PG (ref 26.5–33)
MCHC RBC AUTO-ENTMCNC: 34.9 G/DL (ref 31.5–36.5)
MCV RBC AUTO: 88 FL (ref 78–100)
MICROALBUMIN UR-MCNC: 6 MG/L
MICROALBUMIN/CREAT UR: 6.9 MG/G CR (ref 0–17)
NONHDLC SERPL-MCNC: 189 MG/DL
PLATELET # BLD AUTO: 180 10E3/UL (ref 150–450)
POTASSIUM BLD-SCNC: 4.8 MMOL/L (ref 3.4–5.3)
PROT SERPL-MCNC: 7.4 G/DL (ref 6.8–8.8)
PSA SERPL-MCNC: 0.5 UG/L (ref 0–4)
RBC # BLD AUTO: 5.16 10E6/UL (ref 4.4–5.9)
SODIUM SERPL-SCNC: 138 MMOL/L (ref 133–144)
TRIGL SERPL-MCNC: 192 MG/DL
TSH SERPL DL<=0.005 MIU/L-ACNC: 1.86 MU/L (ref 0.4–4)
WBC # BLD AUTO: 4.6 10E3/UL (ref 4–11)

## 2022-04-12 PROCEDURE — 80061 LIPID PANEL: CPT | Performed by: FAMILY MEDICINE

## 2022-04-12 PROCEDURE — 80053 COMPREHEN METABOLIC PANEL: CPT | Performed by: FAMILY MEDICINE

## 2022-04-12 PROCEDURE — 99214 OFFICE O/P EST MOD 30 MIN: CPT | Mod: 25 | Performed by: FAMILY MEDICINE

## 2022-04-12 PROCEDURE — 82043 UR ALBUMIN QUANTITATIVE: CPT | Performed by: FAMILY MEDICINE

## 2022-04-12 PROCEDURE — 85027 COMPLETE CBC AUTOMATED: CPT | Performed by: FAMILY MEDICINE

## 2022-04-12 PROCEDURE — 36415 COLL VENOUS BLD VENIPUNCTURE: CPT | Performed by: FAMILY MEDICINE

## 2022-04-12 PROCEDURE — 84443 ASSAY THYROID STIM HORMONE: CPT | Performed by: FAMILY MEDICINE

## 2022-04-12 PROCEDURE — 83036 HEMOGLOBIN GLYCOSYLATED A1C: CPT | Performed by: FAMILY MEDICINE

## 2022-04-12 PROCEDURE — G0103 PSA SCREENING: HCPCS | Performed by: FAMILY MEDICINE

## 2022-04-12 PROCEDURE — 99396 PREV VISIT EST AGE 40-64: CPT | Performed by: FAMILY MEDICINE

## 2022-04-12 RX ORDER — LISINOPRIL 40 MG/1
40 TABLET ORAL DAILY
Qty: 90 TABLET | Refills: 3 | Status: SHIPPED | OUTPATIENT
Start: 2022-04-12 | End: 2023-05-15

## 2022-04-12 ASSESSMENT — ENCOUNTER SYMPTOMS
DIZZINESS: 0
NAUSEA: 0
HEMATURIA: 0
DYSURIA: 0
PARESTHESIAS: 0
HEARTBURN: 0
JOINT SWELLING: 0
ABDOMINAL PAIN: 0
SORE THROAT: 0
SHORTNESS OF BREATH: 0
EYE PAIN: 0
MYALGIAS: 0
COUGH: 0
HEADACHES: 0
HEMATOCHEZIA: 0
PALPITATIONS: 0
NERVOUS/ANXIOUS: 0
CHILLS: 0
DIARRHEA: 0
FREQUENCY: 0
CONSTIPATION: 0
FEVER: 0
WEAKNESS: 0
ARTHRALGIAS: 0

## 2022-04-12 NOTE — PATIENT INSTRUCTIONS
Blood pressure goal less than 140/90  Check daily blood pressures  Call me on Friday (4/15/22)with your blood pressure readings. If it is still elevated then will try hydrochlorothiazide 12.5 mg daily in the morning.

## 2022-04-12 NOTE — PROGRESS NOTES
SUBJECTIVE:   CC: Yan Young is an 56 year old male who presents for preventative health visit.       Patient has been advised of split billing requirements and indicates understanding: Yes  Healthy Habits:     Getting at least 3 servings of Calcium per day:  Yes    Bi-annual eye exam:  Yes    Dental care twice a year:  Yes    Sleep apnea or symptoms of sleep apnea:  None    Diet:  Regular (no restrictions)    Frequency of exercise:  2-3 days/week    Duration of exercise:  Greater than 60 minutes    Taking medications regularly:  Yes    Barriers to taking medications:  None    Medication side effects:  None    PHQ-2 Total Score: 0    Additional concerns today:  No    Back pain a little worse and will start increasing activity over the spring/summer. H/o self limited back pain which helps when he rides his back. He wants to start riding his back.     He hasn't been checking his home BP. He has gained a little weight over the winter.     Today's PHQ-2 Score:   PHQ-2 (  [employer]) 2022   Q1: Little interest or pleasure in doing things 0   Q2: Feeling down, depressed or hopeless 0   PHQ-2 Score 0   PHQ-2 Total Score (12-17 Years)- Positive if 3 or more points; Administer PHQ-A if positive -   Q1: Little interest or pleasure in doing things Not at all   Q2: Feeling down, depressed or hopeless Not at all   PHQ-2 Score 0       Abuse: Current or Past(Physical, Sexual or Emotional)- No  Do you feel safe in your environment? Yes        Social History     Tobacco Use     Smoking status: Former Smoker     Packs/day: 1.00     Years: 10.00     Pack years: 10.00     Types: Cigarettes     Start date: 1981     Quit date: 1991     Years since quittin.2     Smokeless tobacco: Never Used     Tobacco comment: quit by 30 years   Substance Use Topics     Alcohol use: Not Currently     Comment: minimal, beer -weekly     If you drink alcohol do you typically have >3 drinks per day or >7 drinks per week?  "No    Alcohol Use 4/12/2022   Prescreen: >3 drinks/day or >7 drinks/week? No   Prescreen: >3 drinks/day or >7 drinks/week? -     Last PSA: No results found for: PSA    Reviewed orders with patient. Reviewed health maintenance and updated orders accordingly - Yes      Reviewed and updated as needed this visit by clinical staff   Tobacco  Allergies  Meds   Med Hx  Surg Hx  Fam Hx  Soc Hx          Reviewed and updated as needed this visit by Provider                     Review of Systems   Constitutional: Negative for chills and fever.   HENT: Negative for congestion, ear pain, hearing loss and sore throat.    Eyes: Negative for pain and visual disturbance.   Respiratory: Negative for cough and shortness of breath.    Cardiovascular: Negative for chest pain, palpitations and peripheral edema.   Gastrointestinal: Negative for abdominal pain, constipation, diarrhea, heartburn, hematochezia and nausea.   Genitourinary: Negative for dysuria, frequency, genital sores, hematuria, impotence, penile discharge and urgency.   Musculoskeletal: Negative for arthralgias, joint swelling and myalgias.   Skin: Negative for rash.   Neurological: Negative for dizziness, weakness, headaches and paresthesias.   Psychiatric/Behavioral: Negative for mood changes. The patient is not nervous/anxious.      OBJECTIVE:   BP (!) 168/109 (BP Location: Right arm, Patient Position: Chair, Cuff Size: Adult Regular)   Pulse 84   Temp 98.2  F (36.8  C) (Temporal)   Ht 1.581 m (5' 2.25\")   Wt 72.1 kg (159 lb)   SpO2 95%   BMI 28.85 kg/m      Physical Exam  GENERAL: healthy, alert and no distress  EYES: Eyes grossly normal to inspection,conjunctivae and sclerae normal  HENT: ear canals and TM's normal  NECK: no adenopathy, no asymmetry, masses, or scars and thyroid normal to palpation  RESP: lungs clear to auscultation - no rales, rhonchi or wheezes  CV: regular rate and rhythm, normal S1 S2  ABDOMEN: soft, nontender, no hepatosplenomegaly, " no masses and bowel sounds normal  MS: no gross musculoskeletal defects noted, no edema  SKIN: no suspicious lesions or rashes  BACK: low midline back tenderness   NEURO: Normal strength and tone, mentation intact and speech normal  PSYCH: mentation appears normal, affect normal/bright    Diagnostic Test Results:  Labs reviewed in Epic    ASSESSMENT/PLAN:        Routine history and physical examination of adult  - declined COVID booster and influenza vaccine  - declined dermatology referral for skin monitoring     Essential hypertension with goal blood pressure less than 140/90  - Not controlled  - Discussed adding medication or monitoring home BP and adjusting dose as indicated. Delta opted to monitor BP.  - Advised below  Blood pressure goal less than 140/90  Check daily blood pressures  Call me on Friday (4/15/22) with your blood pressure readings. If it is still elevated then will try hydrochlorothiazide 12.5 mg daily in the morning.   - CBC with platelets; Future  - Comprehensive metabolic panel (BMP + Alb, Alk Phos, ALT, AST, Total. Bili, TP); Future  - Albumin Random Urine Quantitative with Creat Ratio; Future  - lisinopril (ZESTRIL) 40 MG tablet; Take 1 tablet (40 mg) by mouth in the morning.  Dispense: 90 tablet; Refill: 3    Thyroid nodule  - TSH with free T4 reflex; Future    Acute low back pain without sciatica, unspecified back pain laterality  - H/o self limited back pain which improved with increased activity. Delta will start increased activity.  - Follow if symptoms worsen or fail to improve.    Screen for colon cancer  - Adult Gastro Ref - Procedure Only; Future    Lipid screening  - Lipid Profile (Chol, Trig, HDL, LDL calc); Future    Screening for prostate cancer  - PSA, screen; Future    Patient has been advised of split billing requirements and indicates understanding: Yes    COUNSELING:   Reviewed preventive health counseling, as reflected in patient instructions    Estimated body mass index is  "28.85 kg/m  as calculated from the following:    Height as of this encounter: 1.581 m (5' 2.25\").    Weight as of this encounter: 72.1 kg (159 lb).         He reports that he quit smoking about 31 years ago. His smoking use included cigarettes. He started smoking about 41 years ago. He has a 10.00 pack-year smoking history. He has never used smokeless tobacco.      Counseling Resources:  ATP IV Guidelines  Pooled Cohorts Equation Calculator  FRAX Risk Assessment  ICSI Preventive Guidelines  Dietary Guidelines for Americans, 2010  USDA's MyPlate  ASA Prophylaxis  Lung CA Screening    Cherise Thomas MD  [facility]  "

## 2022-04-14 LAB — HBA1C MFR BLD: 5.4 % (ref 0–5.6)

## 2022-04-15 ENCOUNTER — TELEPHONE (OUTPATIENT)
Dept: FAMILY MEDICINE | Facility: CLINIC | Age: 57
End: 2022-04-15
Payer: COMMERCIAL

## 2022-04-15 DIAGNOSIS — I10 PRIMARY HYPERTENSION: Primary | ICD-10-CM

## 2022-04-15 RX ORDER — HYDROCHLOROTHIAZIDE 12.5 MG/1
12.5 TABLET ORAL DAILY
Qty: 30 TABLET | Refills: 1 | Status: SHIPPED | OUTPATIENT
Start: 2022-04-15 | End: 2022-06-14

## 2022-04-15 NOTE — TELEPHONE ENCOUNTER
Pt calling to report home BPs:    Forgot on Weds Thurs 145/82  Fri 150/80    OK with adding what Dr. Thomas recommended which per her progress note on 4/12/22 is 12.5 mg hydrochlorothiazide daily.    Pended below.    NA Nicolas RN  [facility]

## 2022-04-15 NOTE — TELEPHONE ENCOUNTER
Sent in medication.     Please have pt start HTCZ 12.5 mg in the morning as it is a diuretic and take lisinopril 40 mg daily at night.     Follow up in one month for clinic visit to recheck blood pressure and lab.    DM

## 2022-04-15 NOTE — TELEPHONE ENCOUNTER
Called pt and relayed this message. He says he will call to schedule appt    CORNELL Davidson RN  [facility]

## 2022-07-16 DIAGNOSIS — I10 PRIMARY HYPERTENSION: ICD-10-CM

## 2022-07-19 RX ORDER — HYDROCHLOROTHIAZIDE 12.5 MG/1
TABLET ORAL
Qty: 30 TABLET | Refills: 0 | Status: SHIPPED | OUTPATIENT
Start: 2022-07-19 | End: 2022-08-19

## 2022-07-19 NOTE — TELEPHONE ENCOUNTER
Medication is being filled for 1 time refill only due to:  BP out of range   Future appt 8/23/22    NA Nicolas RN  [facility]

## 2022-08-23 ENCOUNTER — OFFICE VISIT (OUTPATIENT)
Dept: FAMILY MEDICINE | Facility: CLINIC | Age: 57
End: 2022-08-23
Payer: COMMERCIAL

## 2022-08-23 VITALS
HEART RATE: 90 BPM | RESPIRATION RATE: 20 BRPM | OXYGEN SATURATION: 95 % | SYSTOLIC BLOOD PRESSURE: 138 MMHG | DIASTOLIC BLOOD PRESSURE: 80 MMHG | BODY MASS INDEX: 29.08 KG/M2 | HEIGHT: 62 IN | WEIGHT: 158 LBS

## 2022-08-23 DIAGNOSIS — Z12.11 SCREEN FOR COLON CANCER: ICD-10-CM

## 2022-08-23 DIAGNOSIS — I10 PRIMARY HYPERTENSION: ICD-10-CM

## 2022-08-23 DIAGNOSIS — E78.5 HYPERLIPIDEMIA, UNSPECIFIED HYPERLIPIDEMIA TYPE: ICD-10-CM

## 2022-08-23 PROCEDURE — 80061 LIPID PANEL: CPT | Performed by: FAMILY MEDICINE

## 2022-08-23 PROCEDURE — 80048 BASIC METABOLIC PNL TOTAL CA: CPT | Performed by: FAMILY MEDICINE

## 2022-08-23 PROCEDURE — 36415 COLL VENOUS BLD VENIPUNCTURE: CPT | Performed by: FAMILY MEDICINE

## 2022-08-23 PROCEDURE — 99214 OFFICE O/P EST MOD 30 MIN: CPT | Performed by: FAMILY MEDICINE

## 2022-08-23 RX ORDER — HYDROCHLOROTHIAZIDE 12.5 MG/1
12.5 TABLET ORAL DAILY
Qty: 90 TABLET | Refills: 3 | Status: SHIPPED | OUTPATIENT
Start: 2022-08-23 | End: 2023-08-29

## 2022-08-23 NOTE — PROGRESS NOTES
Assessment & Plan     1. Primary hypertension  - stable, refill below  - hydrochlorothiazide (HYDRODIURIL) 12.5 MG tablet; Take 1 tablet (12.5 mg) by mouth daily  Dispense: 90 tablet; Refill: 3  - Basic metabolic panel  (Ca, Cl, CO2, Creat, Gluc, K, Na, BUN); Future  - Basic metabolic panel  (Ca, Cl, CO2, Creat, Gluc, K, Na, BUN)    2. Hyperlipidemia, unspecified hyperlipidemia type  - working on lifestyle changes   - Lipid Profile (Chol, Trig, HDL, LDL calc); Future  - Lipid Profile (Chol, Trig, HDL, LDL calc)    3. Screen for colon cancer  - will schedule colonoscopy after [place] trip [place]         Return in about 8 months (around 4/23/2023) for Physical Exam.    Cherise Thomas MD  [facility]    Violeta Sorenson is a 57 year old accompanied by his alone, presenting for the following health issues:  Medication Follow-up      History of Present Illness       Hypertension: He presents for follow up of hypertension.  He does not check blood pressure  regularly outside of the clinic. Outside blood pressures have been over 140/90. He does not follow a low salt diet.         Medication Followup of lisinopril/HCTZ    Taking Medication as prescribed: yes    Side Effects:  None    Medication Helping Symptoms:  yes        Review of Systems         Objective    There were no vitals taken for this visit.  There is no height or weight on file to calculate BMI.  Physical Exam                       .  ..

## 2022-08-24 LAB
ANION GAP SERPL CALCULATED.3IONS-SCNC: 5 MMOL/L (ref 3–14)
BUN SERPL-MCNC: 18 MG/DL (ref 7–30)
CALCIUM SERPL-MCNC: 9.4 MG/DL (ref 8.5–10.1)
CHLORIDE BLD-SCNC: 110 MMOL/L (ref 94–109)
CHOLEST SERPL-MCNC: 258 MG/DL
CO2 SERPL-SCNC: 24 MMOL/L (ref 20–32)
CREAT SERPL-MCNC: 0.91 MG/DL (ref 0.66–1.25)
FASTING STATUS PATIENT QL REPORTED: NO
GFR SERPL CREATININE-BSD FRML MDRD: >90 ML/MIN/1.73M2
GLUCOSE BLD-MCNC: 111 MG/DL (ref 70–99)
HDLC SERPL-MCNC: 45 MG/DL
LDLC SERPL CALC-MCNC: ABNORMAL MG/DL
NONHDLC SERPL-MCNC: 213 MG/DL
POTASSIUM BLD-SCNC: 4.9 MMOL/L (ref 3.4–5.3)
SODIUM SERPL-SCNC: 139 MMOL/L (ref 133–144)
TRIGL SERPL-MCNC: 452 MG/DL

## 2022-08-30 ENCOUNTER — MYC MEDICAL ADVICE (OUTPATIENT)
Dept: FAMILY MEDICINE | Facility: CLINIC | Age: 57
End: 2022-08-30

## 2022-08-30 DIAGNOSIS — Z30.2 ENCOUNTER FOR VASECTOMY: Primary | ICD-10-CM

## 2022-09-01 NOTE — TELEPHONE ENCOUNTER
Urology referral pended for vasectomy    Yaquelin Corea, LIZABETHN RN  [facility]

## 2022-09-11 ENCOUNTER — HEALTH MAINTENANCE LETTER (OUTPATIENT)
Age: 57
End: 2022-09-11

## 2022-10-06 ENCOUNTER — VIRTUAL VISIT (OUTPATIENT)
Dept: UROLOGY | Facility: CLINIC | Age: 57
End: 2022-10-06
Attending: FAMILY MEDICINE
Payer: COMMERCIAL

## 2022-10-06 VITALS — BODY MASS INDEX: 27.46 KG/M2 | WEIGHT: 155 LBS | HEIGHT: 63 IN

## 2022-10-06 DIAGNOSIS — Z30.2 ENCOUNTER FOR VASECTOMY: ICD-10-CM

## 2022-10-06 PROCEDURE — 99203 OFFICE O/P NEW LOW 30 MIN: CPT | Mod: 95 | Performed by: UROLOGY

## 2022-10-06 ASSESSMENT — PAIN SCALES - GENERAL: PAINLEVEL: NO PAIN (1)

## 2022-10-06 NOTE — LETTER
10/6/2022       RE: Yan Young  [address]  [place] [address]     Dear Colleague,    Thank you for referring your patient, Yan Young, to the [facility]. Please see a copy of my visit note below.    PT WILL MEET YOU IN MYCHART    PT WANTS VAS SO HIS WIFE CAN COME OFF BIRTH CONTROL      Delta is a 57 year old who is being evaluated via a billable video visit.      How would you like to obtain your AVS? MyChart  If the video visit is dropped, the invitation should be resent by: Text to cell phone: [phone]  Will anyone else be joining your video visit? No        VASECTOMY CONSULTATION NOTE  [facility]  [phone]  DATE OF VISIT: 10/6/2022    PATIENT NAME: Yan Young    YOB: 1965      REASON FOR CONSULTATION: Mr. Yan Young is a 57 year old year old gentleman who is being seen as a virtual visit in the urology clinic today requesting a vasectomy. He and his wife have 3 grown children and he wishes to have a vasectomy for birth control.  His wife is on oral contraceptives and would like to stop using oral contraceptives.  He has no prior urologic history and has had no prior surgery on the testicles. He has no symptoms in the testicles.    PAST MEDICAL HISTORY:   Past Medical History:   Diagnosis Date     Complication of anesthesia     difficulty waking, low sats with general anesthesia     Hernia, abdominal      Hypertension      Hypertension      Thyroid nodule 01/25/2021       PAST SURGICAL HISTORY:   Past Surgical History:   Procedure Laterality Date     EXCISE MASS HEAD N/A 10/31/2019    Procedure: EXCISION LEFT OCCIPUT CYST;  Surgeon: Shanna Faith MD;  Location: [facility] OR     LAPAROSCOPIC HERNIORRHAPHY INGUINAL Right 12/03/2018    Procedure: Laparoscopic Convert to Open Right Inguinal Hernia Repair with Mesh;  Surgeon: Alexa Enriquez MD;   [facility]: [facility] OR     left knee surgery      surgery in his 20s, laproscopic       MEDICATIONS:   Current Outpatient Medications:      hydrochlorothiazide (HYDRODIURIL) 12.5 MG tablet, Take 1 tablet (12.5 mg) by mouth daily, Disp: 90 tablet, Rfl: 3     lisinopril (ZESTRIL) 40 MG tablet, Take 1 tablet (40 mg) by mouth in the morning., Disp: 90 tablet, Rfl: 3     ibuprofen (ADVIL/MOTRIN) 200 MG tablet, Take 3 tablets (600 mg) by mouth every 6 hours as needed for pain (Patient not taking: Reported on 10/6/2022), Disp: 100 tablet, Rfl: 0    ALLERGIES: No Known Allergies    FAMILY HISTORY:   Family History   Problem Relation Age of Onset     Pancreatic Cancer Father          at 53 yrs     Colon Cancer Father      Arthritis Sister        SOCIAL HISTORY:   Social History     Socioeconomic History     Marital status:      Spouse name: Not on file     Number of children: Not on file     Years of education: Not on file     Highest education level: Not on file   Occupational History     Not on file   Tobacco Use     Smoking status: Former Smoker     Packs/day: 1.00     Years: 10.00     Pack years: 10.00     Types: Cigarettes     Start date: 1981     Quit date: 1991     Years since quittin.7     Smokeless tobacco: Never Used     Tobacco comment: quit by 30 years   Substance and Sexual Activity     Alcohol use: Not Currently     Comment: minimal, beer -weekly     Drug use: No     Sexual activity: Yes     Partners: Female     Birth control/protection: Other   Other Topics Concern     Parent/sibling w/ CABG, MI or angioplasty before 65F 55M? No   Social History Narrative     Not on file     Social Determinants of Health     Financial Resource Strain: Not on file   Food Insecurity: Not on file   Transportation Needs: Not on file   Physical Activity: Not on file   Stress: Not on file   Social Connections: Not on file   Intimate Partner Violence: Not on file   Housing Stability: Not on file       REVIEW OF  "SYSTEMS:  Skin: No rash, pruritis, or skin pigmentation  Eyes: No changes in vision  Ears/Nose/Throat: No changes in hearing, no nosebleeds  Respiratory: No shortness of breath, dyspnea on exertion, cough, or hemoptysis  Cardiovascular: No chest pain or palpitations  Gastrointestinal: No diarrhea or constipation. No abdominal pain. No hematochezia  Genitourinary: see HPI  Musculoskeletal: No pain or swelling of joints, normal range of motion  Neurologic: No weakness or tremors  Psychiatric: No recent changes in memory or mood  Hematologic/Lymphatic/Immunologic: No easy bruising or enlarged lymph nodes  Endocrine: No weight gain or loss      HEIGHT: 5' 3\"     WEIGHT: 155 lbs 0 oz   BP: Data Unavailable    PULSE: Data Unavailable    EXAM:   General: Alert and oriented to time, place, and self. In NAD   HEENT: Head AT/NC, EOMI, CN Grossly intact   Lungs: no respiratory distress, or pursed lip breathing   Heart: No obvious jugular venous distension present   Musculoskeltal: Normal movements. Normal appearing musculature  Skin: no suspicious lesions or rashes   Neuro: Alert, oriented, speech and mentation normal; moving all 4 extremities equally.   Psych: affect and mood normal      DIAGNOSIS: Request for sterilization    PLAN: The risks of the procedure as well as expectations for recovery and outcomes were splint in detail to him.  He was counseled on the risks for bleeding infection and pain after the procedure.  He was instructed to continue to use contraception until he had proven azoospermia on a semen specimen.  This would normally be collected at least 3 months after the procedure.  He was instructed to hold all anticoagulants medications for one week prior to the procedure.  He was also instructed to shave the scrotum prior to procedure.  It was recommended that he have someone else drive him home after his vasectomy.  In light of these risks and expectations he would like to proceed.  We are scheduling a " vasectomy in the office in the near future.  This visit today was performed via video.  He understands that because of this I was not able to examine the testicles in person today.  I will perform an examination at the beginning of the vasectomy and he understands that there is a small chance the procedure may need to be moved to the operating room.    Sergio Go M.D.      Video-Visit Details    Video Start Time: 10:00 AM    Type of service:  Video Visit    Video End Time:10:09 AM    Originating Location (pt. Location): Home    Distant Location (provider location):  [facility] [place]     Platform used for Video Visit: Vicky

## 2022-10-06 NOTE — PROGRESS NOTES
PT WILL MEET YOU IN [employer]    PT WANTS VAS SO HIS WIFE CAN COME OFF BIRTH CONTROL      Delta is a 57 year old who is being evaluated via a billable video visit.      How would you like to obtain your AVS? [place] [employer]  If the video visit is dropped, the invitation should be resent by: Text to cell phone: [phone]  Will anyone else be joining your video visit? No        VASECTOMY CONSULTATION NOTE  [facility]  [phone]  DATE OF VISIT: 10/6/2022    PATIENT NAME: Yan Young    YOB: 1965      REASON FOR CONSULTATION: Mr. Yan Young is a 57 year old year old gentleman who is being seen as a virtual visit in the urology clinic today requesting a vasectomy. He and his wife have 3 grown children and he wishes to have a vasectomy for birth control.  His wife is on oral contraceptives and would like to stop using oral contraceptives.  He has no prior urologic history and has had no prior surgery on the testicles. He has no symptoms in the testicles.    PAST MEDICAL HISTORY:   Past Medical History:   Diagnosis Date     Complication of anesthesia     difficulty waking, low sats with general anesthesia     Hernia, abdominal      Hypertension      Hypertension      Thyroid nodule 01/25/2021       PAST SURGICAL HISTORY:   Past Surgical History:   Procedure Laterality Date     EXCISE MASS HEAD N/A 10/31/2019    Procedure: EXCISION LEFT OCCIPUT CYST;  Surgeon: Shanna Faith MD;  Location: [facility] OR     LAPAROSCOPIC HERNIORRHAPHY INGUINAL Right 12/03/2018    Procedure: Laparoscopic Convert to Open Right Inguinal Hernia Repair with Mesh;  Surgeon: Alexa Enriquez MD;  [facility]     left knee surgery      surgery in his 20s, laproscopic       MEDICATIONS:   Current Outpatient Medications:      hydrochlorothiazide (HYDRODIURIL) 12.5 MG tablet, Take 1 tablet (12.5 mg) by mouth daily, Disp: 90 tablet, Rfl: 3     lisinopril (ZESTRIL) 40 MG tablet, Take 1 tablet (40 mg) by mouth  in the morning., Disp: 90 tablet, Rfl: 3     ibuprofen (ADVIL/MOTRIN) 200 MG tablet, Take 3 tablets (600 mg) by mouth every 6 hours as needed for pain (Patient not taking: Reported on 10/6/2022), Disp: 100 tablet, Rfl: 0    ALLERGIES: No Known Allergies    FAMILY HISTORY:   Family History   Problem Relation Age of Onset     Pancreatic Cancer Father          at 53 yrs     Colon Cancer Father      Arthritis Sister        SOCIAL HISTORY:   Social History     Socioeconomic History     Marital status:      Spouse name: Not on file     Number of children: Not on file     Years of education: Not on file     Highest education level: Not on file   Occupational History     Not on file   Tobacco Use     Smoking status: Former Smoker     Packs/day: 1.00     Years: 10.00     Pack years: 10.00     Types: Cigarettes     Start date: 1981     Quit date: 1991     Years since quittin.7     Smokeless tobacco: Never Used     Tobacco comment: quit by 30 years   Substance and Sexual Activity     Alcohol use: Not Currently     Comment: minimal, beer -weekly     Drug use: No     Sexual activity: Yes     Partners: Female     Birth control/protection: Other   Other Topics Concern     Parent/sibling w/ CABG, MI or angioplasty before 65F 55M? No   Social History Narrative     Not on file     Social Determinants of Health     Financial Resource Strain: Not on file   Food Insecurity: Not on file   Transportation Needs: Not on file   Physical Activity: Not on file   Stress: Not on file   Social Connections: Not on file   Intimate Partner Violence: Not on file   Housing Stability: Not on file       REVIEW OF SYSTEMS:  Skin: No rash, pruritis, or skin pigmentation  Eyes: No changes in vision  Ears/Nose/Throat: No changes in hearing, no nosebleeds  Respiratory: No shortness of breath, dyspnea on exertion, cough, or hemoptysis  Cardiovascular: No chest pain or palpitations  Gastrointestinal: No diarrhea or constipation. No  "abdominal pain. No hematochezia  Genitourinary: see HPI  Musculoskeletal: No pain or swelling of joints, normal range of motion  Neurologic: No weakness or tremors  Psychiatric: No recent changes in memory or mood  Hematologic/Lymphatic/Immunologic: No easy bruising or enlarged lymph nodes  Endocrine: No weight gain or loss      HEIGHT: 5' 3\"     WEIGHT: 155 lbs 0 oz   BP: Data Unavailable    PULSE: Data Unavailable    EXAM:   General: Alert and oriented to time, place, and self. In NAD   HEENT: Head AT/NC, EOMI, CN Grossly intact   Lungs: no respiratory distress, or pursed lip breathing   Heart: No obvious jugular venous distension present   Musculoskeltal: Normal movements. Normal appearing musculature  Skin: no suspicious lesions or rashes   Neuro: Alert, oriented, speech and mentation normal; moving all 4 extremities equally.   Psych: affect and mood normal      DIAGNOSIS: Request for sterilization    PLAN: The risks of the procedure as well as expectations for recovery and outcomes were splint in detail to him.  He was counseled on the risks for bleeding infection and pain after the procedure.  He was instructed to continue to use contraception until he had proven azoospermia on a semen specimen.  This would normally be collected at least 3 months after the procedure.  He was instructed to hold all anticoagulants medications for one week prior to the procedure.  He was also instructed to shave the scrotum prior to procedure.  It was recommended that he have someone else drive him home after his vasectomy.  In light of these risks and expectations he would like to proceed.  We are scheduling a vasectomy in the office in the near future.  This visit today was performed via video.  He understands that because of this I was not able to examine the testicles in person today.  I will perform an examination at the beginning of the vasectomy and he understands that there is a small chance the procedure may need to be " moved to the operating room.    Sergio Go M.D.      Video-Visit Details    Video Start Time: 10:00 AM    Type of service:  Video Visit    Video End Time:10:09 AM    Originating Location (pt. Location): Home    Distant Location (provider location):  [facility]     Platform used for Video Visit: [employer]

## 2022-12-07 ENCOUNTER — HOSPITAL ENCOUNTER (OUTPATIENT)
Facility: CLINIC | Age: 57
End: 2022-12-07
Attending: INTERNAL MEDICINE
Payer: COMMERCIAL

## 2022-12-07 ENCOUNTER — TELEPHONE (OUTPATIENT)
Dept: GASTROENTEROLOGY | Facility: CLINIC | Age: 57
End: 2022-12-07

## 2022-12-07 NOTE — TELEPHONE ENCOUNTER
Screening Questions  BLUE  KIND OF PREP RED  LOCATION [review exclusion criteria] GREEN  SEDATION TYPE        y Are you active on mychart?       Mohamed Ordering/Referring Provider?        PO What type of coverage do you have?      y Have you had a positive covid test in the last 14 days?     28.0 1. BMI  [BMI 40+ - review exclusion criteria]    y  2. Are you able to give consent for your medical care? [IF NO,RN REVIEW]        n  3. Are you taking any prescription pain medications on a routine schedule?      n  3a. EXTENDED PREP What kind of prescription?     n 4. Do you have any chemical dependencies such as alcohol, street drugs, or methadone?    n 5. Do you have any history of post-traumatic stress syndrome, severe anxiety or history of psychosis?      **If yes 3- 5 , please schedule with MAC sedation.**          IF YES TO ANY 6 - 10 - HOSPITAL SETTING ONLY.     n 6.   Do you need assistance transferring?     n 7.   Have you had a heart or lung transplant?    n 8.   Are you currently on dialysis?   n 9.   Do you use daily home oxygen?   n 10. Do you take nitroglycerin?   10a. n If yes, how often?     11. [FEMALES]   Are you currently pregnant?    11a.  If yes, how many weeks? [ Greater than 12 weeks, OR NEEDED]    n 12. Do you have Pulmonary Hypertension? *NEED PAC APPT AT [facility]*     n 13. [review exclusion criteria]  Do you have any implantable devices in your body (pacemaker, defib, LVAD)?    n 14. In the past 6 months, have you had any heart related issues including cardiomyopathy or heart attack?     14a. n If yes, did it require cardiac stenting if so when?     n 15. Have you had a stroke or Transient ischemic attack (TIA - aka  mini stroke ) within 6 months?      n 16. Do you have mod to severe Obstructive Sleep Apnea?  [Hospital only - Ok at [place]]    n 17. Do you have SEVERE AND UNCONTROLLED asthma? *NEED PAC APPT AT [facility]*     n 18. Are you currently taking any blood thinners?     18a. If yes,  "inform patient to \"follow up w/ ordering provider for bridging instructions.\"    n 19. Do you take the medication Phentermine?    19a. If yes, \"Hold for 7 days before procedure.  Please consult your prescribing provider if you have questions about holding this medication.\"     n  20. Do you have chronic kidney disease?      n  21. Do you have a diagnosis of diabetes?     n  22. On a regular basis do you go 3-5 days between bowel movements?      23. Preferred LOCAL Pharmacy for Pre Prescription    [ LIST ONLY ONE PHARMACY]     [facility]/PHARMACY #[id number] - [place], [place] - [address] [place] [address]        - [facility] REMINDERS -    Informed patient they will need an adult    Cannot take any type of public or medical transportation alone    Conscious Sedation- Needs  for 6 hours after the procedure       MAC/General-Needs  for 24 hours after procedure    Pre-Procedure Covid test to be completed [[facility] PCR Testing Required]    Confirmed Nurse will call to complete assessment       - SCHEDULING DETAILS -       Surgeon    1/4/23  Date of Procedure  Lower Endoscopy [Colonoscopy]  Type of Procedure Scheduled  [facility]-If you answer yes to questions #8, #20, #21Which Colonoscopy Prep was Sent?     CS Sedation Type     n PAC / Pre-op Required         Additional comments:            "

## 2022-12-20 ENCOUNTER — TELEPHONE (OUTPATIENT)
Dept: GASTROENTEROLOGY | Facility: CLINIC | Age: 57
End: 2022-12-20

## 2022-12-20 DIAGNOSIS — Z12.11 SPECIAL SCREENING FOR MALIGNANT NEOPLASMS, COLON: Primary | ICD-10-CM

## 2022-12-20 RX ORDER — BISACODYL 5 MG
TABLET, DELAYED RELEASE (ENTERIC COATED) ORAL
Qty: 4 TABLET | Refills: 0 | Status: SHIPPED | OUTPATIENT
Start: 2022-12-20 | End: 2023-08-29

## 2022-12-20 NOTE — TELEPHONE ENCOUNTER
Pre assessment questions completed for upcoming colonoscopy  procedure scheduled on 1/4/23    COVID policy reviewed.     Pre-op scheduled  N/A    Reviewed procedural arrival time 1230 and facility location [facility]; [address] [place] [address], [address] Floor, [place] [address]     Pt was concerned that he was under conscious sedation. He did have MAC on his last colonoscopy 5 yrs ago. I told pt I could send him to scheduling but it might change the date of his procedure. Pt verbalized understanding and said he would keep his current appoint under C.S.     Designated  policy reviewed. Instructed to have someone stay 6 hours post procedure.     Anticoagulation/blood thinners? No    Electronic implanted devices? No    Diabetic? No    Procedure indication: screening    Bowel prep recommendation: Standard Golytely     Reviewed procedure prep instructions.     Prep instructions sent via [employer].  Bowel prep script sent to [facility]/PHARMACY #[id number] - [place], [place] - [address]     Patient verbalized understanding and had no questions or concerns at this time.    FRANK GIBSON RN

## 2023-01-03 ENCOUNTER — TELEPHONE (OUTPATIENT)
Dept: GASTROENTEROLOGY | Facility: CLINIC | Age: 58
End: 2023-01-03

## 2023-01-03 NOTE — TELEPHONE ENCOUNTER
Caller: Yan Young  Reason for Reschedule/Cancellation (please be detailed, any staff messages or encounters to note?): family emergency      Prior to reschedule please review:    Ordering Provider:William    Sedation per order:moderate    Does patient have any ASC Exclusions, please identify?: n      Notes on Cancelled Procedure:    Procedure:Lower Endoscopy [Colonoscopy]     Date: 1/4/23    Location:[facility]; [address] [place] [address], 5th Floor, [place] [address]    Surgeon:         Rescheduled: Yes    Procedure: Lower Endoscopy [Colonoscopy]     Date: 3/10/23    Location:[facility]; [address] [place] [address]    Surgeon: Xavier    Sedation Level Scheduled  moderate,  Reason for Sedation Level on block for that day    Prep/Instructions updated and sent: just details he already has his prep at home

## 2023-02-09 RX ORDER — BISACODYL 5 MG
TABLET, DELAYED RELEASE (ENTERIC COATED) ORAL
Qty: 4 TABLET | Refills: 0 | Status: SHIPPED | OUTPATIENT
Start: 2023-02-09 | End: 2023-08-29

## 2023-03-10 ENCOUNTER — HOSPITAL ENCOUNTER (OUTPATIENT)
Facility: CLINIC | Age: 58
Discharge: HOME | End: 2023-03-10
Attending: INTERNAL MEDICINE | Admitting: INTERNAL MEDICINE
Payer: COMMERCIAL

## 2023-03-10 VITALS
HEIGHT: 63 IN | TEMPERATURE: 97.4 F | OXYGEN SATURATION: 97 % | HEART RATE: 70 BPM | SYSTOLIC BLOOD PRESSURE: 156 MMHG | DIASTOLIC BLOOD PRESSURE: 111 MMHG | BODY MASS INDEX: 27.46 KG/M2 | RESPIRATION RATE: 16 BRPM | WEIGHT: 155 LBS

## 2023-03-10 DIAGNOSIS — L72.9 SCALP CYST: ICD-10-CM

## 2023-03-10 DIAGNOSIS — J95.89: ICD-10-CM

## 2023-03-10 DIAGNOSIS — R06.03: ICD-10-CM

## 2023-03-10 DIAGNOSIS — Z98.890 S/P INGUINAL HERNIA REPAIR: ICD-10-CM

## 2023-03-10 DIAGNOSIS — L72.0 EIC (EPIDERMAL INCLUSION CYST): ICD-10-CM

## 2023-03-10 DIAGNOSIS — Z87.19 S/P INGUINAL HERNIA REPAIR: ICD-10-CM

## 2023-03-10 DIAGNOSIS — Z12.11 SPECIAL SCREENING FOR MALIGNANT NEOPLASMS, COLON: Primary | ICD-10-CM

## 2023-03-10 DIAGNOSIS — R76.8 ANTI-TPO ANTIBODIES PRESENT: ICD-10-CM

## 2023-03-10 DIAGNOSIS — E04.1 THYROID NODULE: ICD-10-CM

## 2023-03-10 DIAGNOSIS — I10 ESSENTIAL HYPERTENSION WITH GOAL BLOOD PRESSURE LESS THAN 140/90: ICD-10-CM

## 2023-03-10 LAB — COLONOSCOPY: NORMAL

## 2023-03-10 PROCEDURE — 88305 TISSUE EXAM BY PATHOLOGIST: CPT | Mod: TC | Performed by: INTERNAL MEDICINE

## 2023-03-10 PROCEDURE — G0500 MOD SEDAT ENDO SERVICE >5YRS: HCPCS | Performed by: INTERNAL MEDICINE

## 2023-03-10 PROCEDURE — 250N000011 HC RX IP 250 OP 636: Performed by: INTERNAL MEDICINE

## 2023-03-10 PROCEDURE — 88305 TISSUE EXAM BY PATHOLOGIST: CPT | Mod: 26 | Performed by: PATHOLOGY

## 2023-03-10 PROCEDURE — 99153 MOD SED SAME PHYS/QHP EA: CPT | Performed by: INTERNAL MEDICINE

## 2023-03-10 PROCEDURE — 45385 COLONOSCOPY W/LESION REMOVAL: CPT | Performed by: INTERNAL MEDICINE

## 2023-03-10 RX ORDER — FLUMAZENIL 0.1 MG/ML
0.2 INJECTION, SOLUTION INTRAVENOUS
Status: DISCONTINUED | OUTPATIENT
Start: 2023-03-10 | End: 2023-03-10 | Stop reason: HOSPADM

## 2023-03-10 RX ORDER — NALOXONE HYDROCHLORIDE 0.4 MG/ML
0.4 INJECTION, SOLUTION INTRAMUSCULAR; INTRAVENOUS; SUBCUTANEOUS
Status: DISCONTINUED | OUTPATIENT
Start: 2023-03-10 | End: 2023-03-10 | Stop reason: HOSPADM

## 2023-03-10 RX ORDER — NALOXONE HYDROCHLORIDE 0.4 MG/ML
0.2 INJECTION, SOLUTION INTRAMUSCULAR; INTRAVENOUS; SUBCUTANEOUS
Status: DISCONTINUED | OUTPATIENT
Start: 2023-03-10 | End: 2023-03-10 | Stop reason: HOSPADM

## 2023-03-10 RX ORDER — EPINEPHRINE 1 MG/ML
0.1 INJECTION, SOLUTION INTRAMUSCULAR; SUBCUTANEOUS
Status: DISCONTINUED | OUTPATIENT
Start: 2023-03-10 | End: 2023-03-10 | Stop reason: HOSPADM

## 2023-03-10 RX ORDER — ONDANSETRON 4 MG/1
4 TABLET, ORALLY DISINTEGRATING ORAL EVERY 6 HOURS PRN
Status: DISCONTINUED | OUTPATIENT
Start: 2023-03-10 | End: 2023-03-10 | Stop reason: HOSPADM

## 2023-03-10 RX ORDER — PROCHLORPERAZINE MALEATE 10 MG
10 TABLET ORAL EVERY 6 HOURS PRN
Status: DISCONTINUED | OUTPATIENT
Start: 2023-03-10 | End: 2023-03-10 | Stop reason: HOSPADM

## 2023-03-10 RX ORDER — ONDANSETRON 2 MG/ML
4 INJECTION INTRAMUSCULAR; INTRAVENOUS
Status: COMPLETED | OUTPATIENT
Start: 2023-03-10 | End: 2023-03-10

## 2023-03-10 RX ORDER — SIMETHICONE 40MG/0.6ML
133 SUSPENSION, DROPS(FINAL DOSAGE FORM)(ML) ORAL
Status: DISCONTINUED | OUTPATIENT
Start: 2023-03-10 | End: 2023-03-10 | Stop reason: HOSPADM

## 2023-03-10 RX ORDER — LIDOCAINE 40 MG/G
CREAM TOPICAL
Status: DISCONTINUED | OUTPATIENT
Start: 2023-03-10 | End: 2023-03-10 | Stop reason: HOSPADM

## 2023-03-10 RX ORDER — DIPHENHYDRAMINE HYDROCHLORIDE 50 MG/ML
25-50 INJECTION INTRAMUSCULAR; INTRAVENOUS
Status: DISCONTINUED | OUTPATIENT
Start: 2023-03-10 | End: 2023-03-10 | Stop reason: HOSPADM

## 2023-03-10 RX ORDER — ONDANSETRON 2 MG/ML
4 INJECTION INTRAMUSCULAR; INTRAVENOUS EVERY 6 HOURS PRN
Status: DISCONTINUED | OUTPATIENT
Start: 2023-03-10 | End: 2023-03-10 | Stop reason: HOSPADM

## 2023-03-10 RX ORDER — FENTANYL CITRATE 50 UG/ML
50-100 INJECTION, SOLUTION INTRAMUSCULAR; INTRAVENOUS EVERY 5 MIN PRN
Status: DISCONTINUED | OUTPATIENT
Start: 2023-03-10 | End: 2023-03-10 | Stop reason: HOSPADM

## 2023-03-10 RX ORDER — ATROPINE SULFATE 0.1 MG/ML
1 INJECTION INTRAVENOUS
Status: DISCONTINUED | OUTPATIENT
Start: 2023-03-10 | End: 2023-03-10 | Stop reason: HOSPADM

## 2023-03-10 RX ADMIN — FENTANYL CITRATE 50 MCG: 50 INJECTION, SOLUTION INTRAMUSCULAR; INTRAVENOUS at 10:02

## 2023-03-10 RX ADMIN — MIDAZOLAM 1 MG: 1 INJECTION INTRAMUSCULAR; INTRAVENOUS at 09:50

## 2023-03-10 RX ADMIN — FENTANYL CITRATE 50 MCG: 50 INJECTION, SOLUTION INTRAMUSCULAR; INTRAVENOUS at 09:48

## 2023-03-10 RX ADMIN — FENTANYL CITRATE 50 MCG: 50 INJECTION, SOLUTION INTRAMUSCULAR; INTRAVENOUS at 09:55

## 2023-03-10 RX ADMIN — MIDAZOLAM 1 MG: 1 INJECTION INTRAMUSCULAR; INTRAVENOUS at 10:08

## 2023-03-10 RX ADMIN — MIDAZOLAM 1 MG: 1 INJECTION INTRAMUSCULAR; INTRAVENOUS at 10:06

## 2023-03-10 RX ADMIN — MIDAZOLAM 2 MG: 1 INJECTION INTRAMUSCULAR; INTRAVENOUS at 09:48

## 2023-03-10 RX ADMIN — FENTANYL CITRATE 50 MCG: 50 INJECTION, SOLUTION INTRAMUSCULAR; INTRAVENOUS at 10:00

## 2023-03-10 RX ADMIN — MIDAZOLAM 1 MG: 1 INJECTION INTRAMUSCULAR; INTRAVENOUS at 10:16

## 2023-03-10 RX ADMIN — ONDANSETRON 4 MG: 2 INJECTION INTRAMUSCULAR; INTRAVENOUS at 09:11

## 2023-03-10 ASSESSMENT — ACTIVITIES OF DAILY LIVING (ADL)
ADLS_ACUITY_SCORE: 35
ADLS_ACUITY_SCORE: 33

## 2023-03-10 NOTE — H&P
Gastroenterology Pre-Procedure Note    Yan Young MRN#  [id number]   Age:  57 year old YOB: 1965    Date of Admission:  3/10/2023     PROCEDURE   Date of Procedure: 3/10/2023  Primary care provider: Cherise Thomas  Type of Endoscopy: colonoscopy  Reason for Procedure: Hx of colon polyps  Type of Anesthesia Anticipated: Moderate (conscious) sedation     SUBJECTIVE   Yan is a 57 year old male who will be undergoing the above procedure.      A history and physical has been performed. The patient's medications and allergies have been reviewed. The risks and benefits of the procedure and the sedation options and risks were discussed with the patient.  All questions were answered and informed consent was obtained.       PAST HISTORY        Past Medical History:   Diagnosis Date     Complication of anesthesia     difficulty waking, low sats with general anesthesia     Hernia, abdominal      Hypertension      Hypertension      Thyroid nodule 2021      Past Surgical History:   Procedure Laterality Date     EXCISE MASS HEAD N/A 10/31/2019    Procedure: EXCISION LEFT OCCIPUT CYST;  Surgeon: Shanna Faith MD;  Location: [facility] OR     LAPAROSCOPIC HERNIORRHAPHY INGUINAL Right 2018    Procedure: Laparoscopic Convert to Open Right Inguinal Hernia Repair with Mesh;  Surgeon: Alexa Enriquez MD;  Location: [facility] OR     left knee surgery      surgery in his 20s, laproscopic      Family History Social History   Family History   Problem Relation Age of Onset     Pancreatic Cancer Father          at 53 yrs     Colon Cancer Father      Arthritis Sister     Social History     Socioeconomic History     Marital status:      Spouse name: Not on file     Number of children: Not on file     Years of education: Not on file     Highest education level: Not on file   Occupational History     Not on file   Tobacco Use     Smoking status: Former     Packs/day: 1.00     Years: 10.00      Pack years: 10.00     Types: Cigarettes     Start date: 1981     Quit date: 1991     Years since quittin.2     Smokeless tobacco: Never     Tobacco comments:     quit by 30 years   Vaping Use     Vaping Use: Never used   Substance and Sexual Activity     Alcohol use: Not Currently     Comment: minimal, beer -weekly     Drug use: No     Sexual activity: Yes     Partners: Female     Birth control/protection: Other   Other Topics Concern     Parent/sibling w/ CABG, MI or angioplasty before 65F 55M? No   Social History Narrative     Not on file     Social Determinants of Health     Financial Resource Strain: Not on file   Food Insecurity: Not on file   Transportation Needs: Not on file   Physical Activity: Not on file   Stress: Not on file   Social Connections: Not on file   Intimate Partner Violence: Not on file   Housing Stability: Not on file        MEDICATIONS & ALLERGIES     Medications Prior to Admission   Medication Sig Dispense Refill Last Dose     hydrochlorothiazide (HYDRODIURIL) 12.5 MG tablet Take 1 tablet (12.5 mg) by mouth daily 90 tablet 3      lisinopril (ZESTRIL) 40 MG tablet Take 1 tablet (40 mg) by mouth in the morning. 90 tablet 3      bisacodyl (DULCOLAX) 5 MG EC tablet Take 2 tablets at 3 pm the day before your procedure. If your procedure is before 11 am, take 2 additional tablets at 11 pm. If your procedure is after 11 am, take 2 additional tablets at 6 am. For additional instructions refer to your colonoscopy prep instructions. 4 tablet 0      ibuprofen (ADVIL/MOTRIN) 200 MG tablet Take 3 tablets (600 mg) by mouth every 6 hours as needed for pain (Patient not taking: Reported on 10/6/2022) 100 tablet 0      polyethylene glycol (GOLYTELY) 236 g suspension The night before the exam at 6 pm drink an 8-ounce glass every 15 minutes until the jug is half empty. If you arrive before 11 AM: Drink the other half of the Golytely jug at 11 PM night before procedure. If you arrive after 11 AM:  "Drink the other half of the Restorando jug at 6 AM day of procedure. For additional instructions refer to your colonoscopy prep instructions. 4000 mL 0       No Known Allergies     OBJECTIVE   Vitals Temp 97.4  F (36.3  C) (Temporal)   Ht 1.6 m (5' 3\")   Wt 70.3 kg (155 lb)   BMI 27.46 kg/m          General:   Well-developed in NAD.   HEENT:   NC/AT. MMM.   Lungs:  No respiratory distress.   Heart:  RRR. +S1, S2.    Abdomen:   Soft. NT/ND. BS active.    Ext/MS:   No cyanosis or erythema.    Neuro:   No focal deficits noted.    Psych:  Appears to have normal affect and mood.   Skin:  No obvious rashes or lesions noted.           IMPRESSION   ASA Class 2 - Mild systemic disease    Proceed with above stated procedure with sedation plan as described.       Andi Park MD  [facility]  [phone]         "

## 2023-03-14 LAB
PATH REPORT.COMMENTS IMP SPEC: NORMAL
PATH REPORT.COMMENTS IMP SPEC: NORMAL
PATH REPORT.FINAL DX SPEC: NORMAL
PATH REPORT.GROSS SPEC: NORMAL
PATH REPORT.MICROSCOPIC SPEC OTHER STN: NORMAL
PATH REPORT.RELEVANT HX SPEC: NORMAL
PHOTO IMAGE: NORMAL

## 2023-05-12 DIAGNOSIS — I10 ESSENTIAL HYPERTENSION WITH GOAL BLOOD PRESSURE LESS THAN 140/90: ICD-10-CM

## 2023-05-12 NOTE — TELEPHONE ENCOUNTER
"Routing refill request to provider for review/approval because:  bp out of range    Last Written Prescription Date:  4/12/22  Last Fill Quantity: 90,  # refills: 3   Last office visit provider:  8/23/22     Requested Prescriptions   Pending Prescriptions Disp Refills     lisinopril (ZESTRIL) 40 MG tablet [Pharmacy Med Name: LISINOPRIL 40 MG TABLET] 90 tablet 3     Sig: TAKE 1 TABLET BY MOUTH EVERY DAY IN THE MORNING       ACE Inhibitors (Including Combos) Protocol Failed - 5/12/2023 12:11 AM        Failed - Blood pressure under 140/90 in past 12 months     BP Readings from Last 3 Encounters:   03/10/23 (!) 156/111   08/23/22 138/80   04/12/22 (!) 175/101                 Failed - Recent (12 mo) or future (30 days) visit within the authorizing provider's specialty     Patient has had an office visit with the authorizing provider or a provider within the authorizing providers department within the previous 12 mos or has a future within next 30 days. See \"Patient Info\" tab in inbasket, or \"Choose Columns\" in Meds & Orders section of the refill encounter.              Passed - Medication is active on med list        Passed - Patient is age 18 or older        Passed - Normal serum creatinine on file in past 12 months     Recent Labs   Lab Test 08/23/22  1422   CR 0.91       Ok to refill medication if creatinine is low          Passed - Normal serum potassium on file in past 12 months     Recent Labs   Lab Test 08/23/22  1422   POTASSIUM 4.9                  Vielka Hurst RN 05/12/23 3:56 PM  "

## 2023-05-15 RX ORDER — LISINOPRIL 40 MG/1
TABLET ORAL
Qty: 90 TABLET | Refills: 0 | Status: SHIPPED | OUTPATIENT
Start: 2023-05-15 | End: 2023-08-10

## 2023-06-03 ENCOUNTER — HEALTH MAINTENANCE LETTER (OUTPATIENT)
Age: 58
End: 2023-06-03

## 2023-08-29 ENCOUNTER — OFFICE VISIT (OUTPATIENT)
Dept: FAMILY MEDICINE | Facility: CLINIC | Age: 58
End: 2023-08-29
Payer: COMMERCIAL

## 2023-08-29 VITALS
BODY MASS INDEX: 27.46 KG/M2 | WEIGHT: 155 LBS | TEMPERATURE: 98.1 F | RESPIRATION RATE: 18 BRPM | HEART RATE: 90 BPM | SYSTOLIC BLOOD PRESSURE: 150 MMHG | HEIGHT: 63 IN | DIASTOLIC BLOOD PRESSURE: 100 MMHG | OXYGEN SATURATION: 93 %

## 2023-08-29 DIAGNOSIS — Z00.00 ROUTINE ADULT HEALTH MAINTENANCE: Primary | ICD-10-CM

## 2023-08-29 DIAGNOSIS — E04.1 THYROID NODULE: ICD-10-CM

## 2023-08-29 DIAGNOSIS — M54.50 CHRONIC MIDLINE LOW BACK PAIN WITHOUT SCIATICA: ICD-10-CM

## 2023-08-29 DIAGNOSIS — R73.01 ELEVATED FASTING GLUCOSE: ICD-10-CM

## 2023-08-29 DIAGNOSIS — I10 PRIMARY HYPERTENSION: ICD-10-CM

## 2023-08-29 DIAGNOSIS — G89.29 CHRONIC MIDLINE LOW BACK PAIN WITHOUT SCIATICA: ICD-10-CM

## 2023-08-29 PROBLEM — L72.9 SCALP CYST: Status: RESOLVED | Noted: 2019-10-29 | Resolved: 2023-08-29

## 2023-08-29 LAB
ANION GAP SERPL CALCULATED.3IONS-SCNC: 13 MMOL/L (ref 7–15)
BUN SERPL-MCNC: 16 MG/DL (ref 8–23)
CALCIUM SERPL-MCNC: 9.6 MG/DL (ref 8.8–10.2)
CHLORIDE SERPL-SCNC: 103 MMOL/L (ref 98–107)
CHOLEST SERPL-MCNC: 254 MG/DL
CREAT SERPL-MCNC: 0.97 MG/DL (ref 0.67–1.17)
DEPRECATED HCO3 PLAS-SCNC: 24 MMOL/L (ref 22–29)
ERYTHROCYTE [DISTWIDTH] IN BLOOD BY AUTOMATED COUNT: 12.3 % (ref 10–15)
GFR SERPL CREATININE-BSD FRML MDRD: 89 ML/MIN/1.73M2
GLUCOSE SERPL-MCNC: 113 MG/DL (ref 70–99)
HBA1C MFR BLD: 5.7 % (ref 0–5.6)
HCT VFR BLD AUTO: 47.4 % (ref 40–53)
HDLC SERPL-MCNC: 50 MG/DL
HGB BLD-MCNC: 16.5 G/DL (ref 13.3–17.7)
LDLC SERPL CALC-MCNC: 158 MG/DL
MCH RBC QN AUTO: 30.8 PG (ref 26.5–33)
MCHC RBC AUTO-ENTMCNC: 34.8 G/DL (ref 31.5–36.5)
MCV RBC AUTO: 89 FL (ref 78–100)
NONHDLC SERPL-MCNC: 204 MG/DL
PLATELET # BLD AUTO: 142 10E3/UL (ref 150–450)
POTASSIUM SERPL-SCNC: 4.1 MMOL/L (ref 3.4–5.3)
RBC # BLD AUTO: 5.35 10E6/UL (ref 4.4–5.9)
SODIUM SERPL-SCNC: 140 MMOL/L (ref 136–145)
TRIGL SERPL-MCNC: 232 MG/DL
TSH SERPL DL<=0.005 MIU/L-ACNC: 1.89 UIU/ML (ref 0.3–4.2)
WBC # BLD AUTO: 6.6 10E3/UL (ref 4–11)

## 2023-08-29 PROCEDURE — 83036 HEMOGLOBIN GLYCOSYLATED A1C: CPT | Performed by: PHYSICIAN ASSISTANT

## 2023-08-29 PROCEDURE — 85027 COMPLETE CBC AUTOMATED: CPT | Performed by: PHYSICIAN ASSISTANT

## 2023-08-29 PROCEDURE — 99396 PREV VISIT EST AGE 40-64: CPT | Performed by: PHYSICIAN ASSISTANT

## 2023-08-29 PROCEDURE — 80048 BASIC METABOLIC PNL TOTAL CA: CPT | Performed by: PHYSICIAN ASSISTANT

## 2023-08-29 PROCEDURE — 36415 COLL VENOUS BLD VENIPUNCTURE: CPT | Performed by: PHYSICIAN ASSISTANT

## 2023-08-29 PROCEDURE — 99214 OFFICE O/P EST MOD 30 MIN: CPT | Mod: 25 | Performed by: PHYSICIAN ASSISTANT

## 2023-08-29 PROCEDURE — 84443 ASSAY THYROID STIM HORMONE: CPT | Performed by: PHYSICIAN ASSISTANT

## 2023-08-29 PROCEDURE — 80061 LIPID PANEL: CPT | Performed by: PHYSICIAN ASSISTANT

## 2023-08-29 RX ORDER — GABAPENTIN 300 MG/1
300 CAPSULE ORAL 3 TIMES DAILY PRN
Qty: 90 CAPSULE | Refills: 3 | Status: SHIPPED | OUTPATIENT
Start: 2023-08-29 | End: 2023-11-24

## 2023-08-29 RX ORDER — LISINOPRIL 40 MG/1
40 TABLET ORAL DAILY
Qty: 90 TABLET | Refills: 3 | Status: SHIPPED | OUTPATIENT
Start: 2023-08-29 | End: 2024-08-30

## 2023-08-29 RX ORDER — HYDROCHLOROTHIAZIDE 12.5 MG/1
12.5 TABLET ORAL DAILY
Qty: 90 TABLET | Refills: 3 | Status: SHIPPED | OUTPATIENT
Start: 2023-08-29 | End: 2024-08-30

## 2023-08-29 ASSESSMENT — ENCOUNTER SYMPTOMS
HEMATOCHEZIA: 0
ABDOMINAL PAIN: 0
CHILLS: 0
EYE PAIN: 0
HEMATURIA: 0
COUGH: 0
DIZZINESS: 0
NERVOUS/ANXIOUS: 0
DIARRHEA: 0
CONSTIPATION: 0

## 2023-08-29 NOTE — PROGRESS NOTES
"SUBJECTIVE:   CC: Delta is an 58 year old who presents for preventative health visit.       2023     9:20 AM   Additional Questions   Roomed by Keyla       Healthy Habits:     Getting at least 3 servings of Calcium per day:  Yes    Bi-annual eye exam:  Yes    Dental care twice a year:  Yes    Sleep apnea or symptoms of sleep apnea:  None    Diet:  Regular (no restrictions)    Frequency of exercise:  4-5 days/week    Duration of exercise:  30-45 minutes    Taking medications regularly:  Yes    Additional concerns today:  Yes    Delta here today for [facility] visit    Chronic back issues  Loosens with activity, worse in AM  Lower back pain, midline - was told his back is \"degenerative\" by a chiropractor, denies shooting pain  Family members on gabapentin, wonders if this is an option  Affects his ability to be active the way he'd like    Lifestyle this summer has been worse eating, more drinking  Doing well with physical activity  Suspects this is why BP is higher  Skips hydrochlorothiazide sometimes when he's travelling    Today's PHQ-2 Score:       2023     9:09 AM   PHQ-2 (  [employer])   Q1: Little interest or pleasure in doing things 0   Q2: Feeling down, depressed or hopeless 0   PHQ-2 Score 0   Q1: Little interest or pleasure in doing things Not at all   Q2: Feeling down, depressed or hopeless Not at all   PHQ-2 Score 0     Social History     Tobacco Use    Smoking status: Former     Packs/day: 1.00     Years: 10.00     Pack years: 10.00     Types: Cigarettes     Start date: 1981     Quit date: 1991     Years since quittin.6    Smokeless tobacco: Never    Tobacco comments:     quit by 30 years   Substance Use Topics    Alcohol use: Not Currently     Comment: minimal, beer -weekly           2023     9:09 AM   Alcohol Use   Prescreen: >3 drinks/day or >7 drinks/week? No       Last PSA:   Prostate Specific Antigen Screen   Date Value Ref Range Status   2022 0.50 0.00 - 4.00 ug/L Final " "    Reviewed orders with patient. Reviewed health maintenance and updated orders accordingly - Yes    Reviewed and updated as needed this visit by clinical staff   Tobacco  Allergies  Meds  Problems  Med Hx  Surg Hx  Fam Hx          Reviewed and updated as needed this visit by Provider   Tobacco   Meds  Problems  Med Hx  Surg Hx  Fam Hx           Review of Systems   Constitutional:  Negative for chills.   HENT:  Negative for congestion and ear pain.    Eyes:  Negative for pain.   Respiratory:  Negative for cough.    Cardiovascular:  Negative for chest pain.   Gastrointestinal:  Negative for abdominal pain, constipation, diarrhea and hematochezia.   Genitourinary:  Negative for hematuria.   Neurological:  Negative for dizziness.   Psychiatric/Behavioral:  The patient is not nervous/anxious.        OBJECTIVE:   BP (!) 150/100   Pulse 90   Temp 98.1  F (36.7  C) (Oral)   Resp 18   Ht 1.588 m (5' 2.5\")   Wt 70.3 kg (155 lb)   SpO2 93%   BMI 27.90 kg/m      Physical Exam  GENERAL: healthy, alert and no distress  EYES: Eyes grossly normal to inspection, PERRL and conjunctivae and sclerae normal  HENT: ear canals and TM's normal, nose and mouth without ulcers or lesions  NECK: no adenopathy, no asymmetry, masses, or scars and thyroid normal to palpation  RESP: lungs clear to auscultation - no rales, rhonchi or wheezes  CV: regular rate and rhythm, normal S1 S2, no S3 or S4, no murmur, click or rub, no peripheral edema and peripheral pulses strong  ABDOMEN: soft, nontender, no hepatosplenomegaly, no masses and bowel sounds normal  MS: no gross musculoskeletal defects noted, no edema  SKIN: no suspicious lesions or rashes  NEURO: Normal strength and tone, mentation intact and speech normal  PSYCH: mentation appears normal, affect normal/bright      ASSESSMENT/PLAN:   Yan was seen today for physical and refill request.    Diagnoses and all orders for this visit:    Routine adult health maintenance  -   " "  REVIEW OF HEALTH MAINTENANCE PROTOCOL ORDERS  -     CBC with platelets; Future  -     Basic metabolic panel; Future  -     Cancel: Lipid panel reflex to direct LDL Non-fasting; Future  -     Lipid panel reflex to direct LDL Fasting; Future  -     CBC with platelets  -     Basic metabolic panel  -     Lipid panel reflex to direct LDL Fasting    Chronic midline low back pain without sciatica - chronic issue, worse. He would like to try gabapentin PRN, I am agreeable to this. OK to use 300mg TID Prn and follow up if no improvement or worsening of pain.  -     gabapentin (NEURONTIN) 300 MG capsule; Take 1 capsule (300 mg) by mouth 3 times daily as needed    Primary hypertension - not at goal, would recommend dose increase of hydrochlorothiazide; however, he doesn't like increased urination with med. Encouraged checking BP at home over the next 2-3 weeks and he will send [employer] message with readings to consider whether dose escalation is still warranted.   -     hydrochlorothiazide (HYDRODIURIL) 12.5 MG tablet; Take 1 tablet (12.5 mg) by mouth daily  -     lisinopril (ZESTRIL) 40 MG tablet; Take 1 tablet (40 mg) by mouth daily    Thyroid nodule  -     TSH WITH FREE T4 REFLEX; Future  -     TSH WITH FREE T4 REFLEX      COUNSELING:   Reviewed preventive health counseling, as reflected in patient instructions      BMI:   Estimated body mass index is 27.9 kg/m  as calculated from the following:    Height as of this encounter: 1.588 m (5' 2.5\").    Weight as of this encounter: 70.3 kg (155 lb).       He reports that he quit smoking about 32 years ago. His smoking use included cigarettes. He started smoking about 42 years ago. He has a 10.00 pack-year smoking history. He has never used smokeless tobacco.            Allyson Castano PA-C  [facility]  "

## 2023-08-30 PROBLEM — R73.03 PREDIABETES: Status: ACTIVE | Noted: 2023-08-30

## 2024-08-29 DIAGNOSIS — I10 PRIMARY HYPERTENSION: ICD-10-CM

## 2024-08-30 RX ORDER — HYDROCHLOROTHIAZIDE 12.5 MG/1
12.5 TABLET ORAL DAILY
Qty: 90 TABLET | Refills: 0 | Status: SHIPPED | OUTPATIENT
Start: 2024-08-30

## 2024-08-30 RX ORDER — LISINOPRIL 40 MG/1
40 TABLET ORAL DAILY
Qty: 90 TABLET | Refills: 0 | Status: SHIPPED | OUTPATIENT
Start: 2024-08-30

## 2024-10-16 ENCOUNTER — OFFICE VISIT (OUTPATIENT)
Dept: FAMILY MEDICINE | Facility: CLINIC | Age: 59
End: 2024-10-16
Payer: COMMERCIAL

## 2024-10-16 VITALS
BODY MASS INDEX: 27.55 KG/M2 | HEIGHT: 63 IN | SYSTOLIC BLOOD PRESSURE: 176 MMHG | TEMPERATURE: 97.4 F | HEART RATE: 93 BPM | DIASTOLIC BLOOD PRESSURE: 110 MMHG | WEIGHT: 155.5 LBS | RESPIRATION RATE: 12 BRPM | OXYGEN SATURATION: 96 %

## 2024-10-16 DIAGNOSIS — Z12.5 SCREENING FOR PROSTATE CANCER: ICD-10-CM

## 2024-10-16 DIAGNOSIS — I10 ESSENTIAL HYPERTENSION WITH GOAL BLOOD PRESSURE LESS THAN 140/90: ICD-10-CM

## 2024-10-16 DIAGNOSIS — M54.50 CHRONIC MIDLINE LOW BACK PAIN WITHOUT SCIATICA: ICD-10-CM

## 2024-10-16 DIAGNOSIS — Z12.83 SKIN CANCER SCREENING: ICD-10-CM

## 2024-10-16 DIAGNOSIS — R39.9 URINARY SYMPTOM OR SIGN: ICD-10-CM

## 2024-10-16 DIAGNOSIS — E04.1 THYROID NODULE: ICD-10-CM

## 2024-10-16 DIAGNOSIS — G89.29 CHRONIC MIDLINE LOW BACK PAIN WITHOUT SCIATICA: ICD-10-CM

## 2024-10-16 DIAGNOSIS — Z00.00 ROUTINE GENERAL MEDICAL EXAMINATION AT A HEALTH CARE FACILITY: Primary | ICD-10-CM

## 2024-10-16 PROCEDURE — 99214 OFFICE O/P EST MOD 30 MIN: CPT | Mod: 25 | Performed by: STUDENT IN AN ORGANIZED HEALTH CARE EDUCATION/TRAINING PROGRAM

## 2024-10-16 PROCEDURE — 99396 PREV VISIT EST AGE 40-64: CPT | Mod: 25 | Performed by: STUDENT IN AN ORGANIZED HEALTH CARE EDUCATION/TRAINING PROGRAM

## 2024-10-16 RX ORDER — CHLORTHALIDONE 25 MG/1
25 TABLET ORAL DAILY
Qty: 90 TABLET | Refills: 3 | Status: SHIPPED | OUTPATIENT
Start: 2024-10-16 | End: 2025-10-11

## 2024-10-16 RX ORDER — HYDROCHLOROTHIAZIDE 12.5 MG/1
12.5 TABLET ORAL DAILY
Qty: 90 TABLET | Refills: 0 | Status: CANCELLED | OUTPATIENT
Start: 2024-10-16

## 2024-10-16 RX ORDER — LISINOPRIL 40 MG/1
40 TABLET ORAL DAILY
Qty: 90 TABLET | Refills: 3 | Status: SHIPPED | OUTPATIENT
Start: 2024-10-16

## 2024-10-16 SDOH — HEALTH STABILITY: PHYSICAL HEALTH: ON AVERAGE, HOW MANY MINUTES DO YOU ENGAGE IN EXERCISE AT THIS LEVEL?: PATIENT DECLINED

## 2024-10-16 SDOH — HEALTH STABILITY: PHYSICAL HEALTH
ON AVERAGE, HOW MANY DAYS PER WEEK DO YOU ENGAGE IN MODERATE TO STRENUOUS EXERCISE (LIKE A BRISK WALK)?: PATIENT DECLINED

## 2024-10-16 ASSESSMENT — SOCIAL DETERMINANTS OF HEALTH (SDOH): HOW OFTEN DO YOU GET TOGETHER WITH FRIENDS OR RELATIVES?: PATIENT DECLINED

## 2024-10-16 ASSESSMENT — PAIN SCALES - GENERAL: PAINLEVEL: MILD PAIN (3)

## 2024-10-16 NOTE — PATIENT INSTRUCTIONS
Patient Education     Blood Pressure:   Stop hydrochlorothiazide, start chlorthalidone 25mg daily.   Keep taking lisinopril 40mg daily    Limit salt, work on getting exercise daily.   Get blood work in two weeks.   If blood pressure consistently > 140/90 (either top or bottom number), then follow-up with me or Dr. Thomas.    Urinary Symptoms:   Get urine and PSA test in two weeks.   If normal labs, then can schedule follow-up visit with me to discuss starting medicine for prostate enlargement (BPH).  Preventive Care Advice   This is general advice given by our system to help you stay healthy. However, your care team may have specific advice just for you. Please talk to your care team about your preventive care needs.  Nutrition  Eat 5 or more servings of fruits and vegetables each day.  Try wheat bread, brown rice and whole grain pasta (instead of white bread, rice, and pasta).  Get enough calcium and vitamin D. Check the label on foods and aim for 100% of the RDA (recommended daily allowance).  Lifestyle  Exercise at least 150 minutes each week  (30 minutes a day, 5 days a week).  Do muscle strengthening activities 2 days a week. These help control your weight and prevent disease.  No smoking.  Wear sunscreen to prevent skin cancer.  Have a dental exam and cleaning every 6 months.  Yearly exams  See your health care team every year to talk about:  Any changes in your health.  Any medicines your care team has prescribed.  Preventive care, family planning, and ways to prevent chronic diseases.  Shots (vaccines)   HPV shots (up to age 26), if you've never had them before.  Hepatitis B shots (up to age 59), if you've never had them before.  COVID-19 shot: Get this shot when it's due.  Flu shot: Get a flu shot every year.  Tetanus shot: Get a tetanus shot every 10 years.  Pneumococcal, hepatitis A, and RSV shots: Ask your care team if you need these based on your risk.  Shingles shot (for age 50 and up)  General health  tests  Diabetes screening:  Starting at age 35, Get screened for diabetes at least every 3 years.  If you are younger than age 35, ask your care team if you should be screened for diabetes.  Cholesterol test: At age 39, start having a cholesterol test every 5 years, or more often if advised.  Bone density scan (DEXA): At age 50, ask your care team if you should have this scan for osteoporosis (brittle bones).  Hepatitis C: Get tested at least once in your life.  STIs (sexually transmitted infections)  Before age 24: Ask your care team if you should be screened for STIs.  After age 24: Get screened for STIs if you're at risk. You are at risk for STIs (including HIV) if:  You are sexually active with more than one person.  You don't use condoms every time.  You or a partner was diagnosed with a sexually transmitted infection.  If you are at risk for HIV, ask about PrEP medicine to prevent HIV.  Get tested for HIV at least once in your life, whether you are at risk for HIV or not.  Cancer screening tests  Cervical cancer screening: If you have a cervix, begin getting regular cervical cancer screening tests starting at age 21.  Breast cancer scan (mammogram): If you've ever had breasts, begin having regular mammograms starting at age 40. This is a scan to check for breast cancer.  Colon cancer screening: It is important to start screening for colon cancer at age 45.  Have a colonoscopy test every 10 years (or more often if you're at risk) Or, ask your provider about stool tests like a FIT test every year or Cologuard test every 3 years.  To learn more about your testing options, visit:   .  For help making a decision, visit:   https://bit.ly/kf41509.  Prostate cancer screening test: If you have a prostate, ask your care team if a prostate cancer screening test (PSA) at age 55 is right for you.  Lung cancer screening: If you are a current or former smoker ages 50 to 80, ask your care team if ongoing lung cancer  screenings are right for you.  For informational purposes only. Not to replace the advice of your health care provider. Copyright   2023 [facility]. All rights reserved. Clinically reviewed by the [facility]. "[employer] 866919 - REV 01/24.

## 2024-10-16 NOTE — PROGRESS NOTES
Preventive Care Visit  [facility]  Carlos Jennings PA-C, Physician Assistant - Medical  Oct 16, 2024      Assessment & Plan     Routine general medical examination at a health care facility  Immunizations: COVID and Flu - given today.   Mental Health: No concerns.   STI Screenings: Declined.   Hearing and Vision: No concerns   Dental Health: No concerns; see dentist regularly.   Preventative Labs: Below labs ordered.   Skin Cancer: No personal/family history of skin cancer. No concerning skin lesions. Recommended utilization of sunscreen SPF 50 when outside with reapplication every 2 hours. Referred to DERM for full skin check.   Colon Cancer: Next colonoscopy due 3/2025.  Prostate Cancer: PSA ordered; plan for yearly visit.   Testicular Cancer: Reviewed that testicular cancer most often effects younger men and recommended regular self exams of both testicles and coming in if any bumps or lumps are felt especially if painless.  Lung Cancer: former smoker, quit 30 years ago, not needed  AAA: former smoker, quit 30 years ago, get at 63.    Discussed getting at least 150 minutes of aerobic exercise weekly, healthy diet with focus on 4-5 serving of fruits/veggies, lean meat, and reducing saturated fats/sugars, and getting 7-8 hours of sleep nightly (should feel rested when waking up or not getting enough).    - BASIC METABOLIC PANEL; Future  - TSH WITH FREE T4 REFLEX; Future  - REVIEW OF HEALTH MAINTENANCE PROTOCOL ORDERS  - INFLUENZA VACCINE TRIVALENT(FLUBLOK)  - COVID-19 12+ (PFIZER)  - Hepatic panel (Albumin, ALT, AST, Bili, Alk Phos, TP); Future  - Lipid panel reflex to direct LDL Fasting; Future    Screening for prostate cancer  - PSA, screen; Future    Essential hypertension with goal blood pressure less than 140/90  Patient reports to clinic with for routine hypertension follow-up with BP of 176/110 today. Denies any concerning symptoms for end-organ damage, including persistent  headaches, changes in vision, extremity weakness, chest pain, palpations, shortness of breath, peripheral edema, or abnormal urine. No elicit drug or etoh use (other than occasional etoh use). Former smoker, quit 30+ years ago. No excessive caffeine intake. No history of diabetes, no stimulant medication use, anemia, or other pertinent health conditions.      Plan:   BP significantly elevated today but asymptomatic.   Stop hydrochlorothiazide, start chlorthalidone 25mg daily.   Keep taking lisinopril 40mg daily    Limit salt, work on getting exercise daily.   Get blood work in two weeks after being on new medication regimen for 2 weeks.   Prefers not to have follow-up appt so will monitor at home closely. If blood pressure consistently > 140/90 (either top or bottom number), then follow-up with me or Dr. Thomas.    - BASIC METABOLIC PANEL; Future  - lisinopril (ZESTRIL) 40 MG tablet; Take 1 tablet (40 mg) by mouth daily.  - chlorthalidone (HYGROTON) 25 MG tablet; Take 1 tablet (25 mg) by mouth daily.    Thyroid nodule  3 thyroid nodules, all meet criteria for FNA in end of 2020. Biopsy negative. TSH normal but thyroid peroxidase antibody elevated. Denies any hot or cold intolerance, weight gain or loss, fatigue      Plan: Obtain TSH with reflex to free T4.     - TSH WITH FREE T4 REFLEX; Future    Urinary symptom or sign  Patient has difficulty being a urinary stream in the morning but then gets better throughout the day.  Denies any other urinary symptoms including no dysuria, hematuria or nocturia.  Last PSA in 2022 was normal.    Plan:   Get PSA and UA.  If normal labs, then can schedule follow-up visit with me to discuss starting medicine for BPH.     - PSA, screen; Future  - UA Macroscopic with reflex to Microscopic and Culture - Lab Collect; Future    Patient has been advised of split billing requirements and indicates understanding: Yes    Chronic Low Back Pain  Lower back pain, midline - was told his back is  "\"degenerative\" by a chiropractor, denies shooting pain. Denies any bladder/bowel changes, saddle anesthesia, lower extremity weakness, or fever/chills.  He was prescribed gabapentin last year, gave this a try, but had side effects of \"loopiness\" discontinued it.  He does use ibuprofen as needed. Affects his ability to be as active as he would like.  Exam normal today; no red flag symptoms.     Plan: Trial PT. Voltaren gel, ibuprofen 600mg with food, and ice/heat as needed up to every 6 hours. Return if back pain is persistent or develops any red flag symptoms: Bladder/bowel changes, saddle anesthesia, lower extremity weakness, or fever/chills. Consider imaging and referral to spine doctor in future.       BMI  Estimated body mass index is 27.99 kg/m  as calculated from the following:    Height as of this encounter: 1.588 m (5' 2.5\").    Weight as of this encounter: 70.5 kg (155 lb 8 oz).   Weight management plan: Discussed healthy diet and exercise guidelines    Counseling  Appropriate preventive services were addressed with this patient via screening, questionnaire, or discussion as appropriate for fall prevention, nutrition, physical activity, Tobacco-use cessation, social engagement, weight loss and cognition.  Checklist reviewing preventive services available has been given to the patient.  Reviewed patient's diet, addressing concerns and/or questions.       Violeta Sorenson is a 59 year old, presenting for the following:  Physical (Pt stated that it is harder to urinate, and use the bathroom more then usual /PT is Fasting /Pt stated that his lisinopril that he has been taking is 20mg but on his medication list it says 40mg )        10/16/2024    10:04 AM   Additional Questions   Roomed by Rere Espinoza   Accompanied by Self        Health Care Directive  Patient does not have a Health Care Directive or Living Will: Discussed advance care planning with patient; information given to patient to review.    HPI  Here for " preventative visit.               10/16/2024   General Health   How would you rate your overall physical health? Good   Feel stress (tense, anxious, or unable to sleep) Patient declined            10/16/2024   Nutrition   Three or more servings of calcium each day? Yes   Diet: Regular (no restrictions)   How many servings of fruit and vegetables per day? (!) 2-3   How many sweetened beverages each day? 0-1            10/16/2024   Exercise   Days per week of moderate/strenous exercise Patient declined   Average minutes spent exercising at this level Patient declined            10/16/2024   Social Factors   Frequency of gathering with friends or relatives Patient declined   Worry food won't last until get money to buy more Patient declined   Food not last or not have enough money for food? Patient declined   Do you have housing? (Housing is defined as stable permanent housing and does not include staying ouside in a car, in a tent, in an abandoned building, in an overnight shelter, or couch-surfing.) Patient declined   Are you worried about losing your housing? Patient declined   Lack of transportation? Patient declined   Unable to get utilities (heat,electricity)? Patient declined            10/16/2024   Fall Risk   Fallen 2 or more times in the past year? No   Trouble with walking or balance? No             10/16/2024   Dental   Dentist two times every year? Yes            10/16/2024   TB Screening   Were you born outside of the [place]? Yes            Today's PHQ-2 Score:       10/16/2024     9:56 AM   PHQ-2 ( 1999 [employer])   Q1: Little interest or pleasure in doing things 0   Q2: Feeling down, depressed or hopeless 0   PHQ-2 Score 0   Q1: Little interest or pleasure in doing things Not at all   Q2: Feeling down, depressed or hopeless Not at all   PHQ-2 Score 0           10/16/2024   Substance Use   Alcohol more than 3/day or more than 7/wk No   Do you use any other substances recreationally? (!) DECLINE        Social  "History     Tobacco Use    Smoking status: Former     Current packs/day: 0.00     Average packs/day: 1 pack/day for 10.0 years (10.0 ttl pk-yrs)     Types: Cigarettes     Start date: 1981     Quit date: 1991     Years since quittin.8    Smokeless tobacco: Never    Tobacco comments:     quit by 30 years   Vaping Use    Vaping status: Never Used   Substance Use Topics    Alcohol use: Not Currently     Comment: minimal, beer -weekly    Drug use: No           10/16/2024   STI Screening   New sexual partner(s) since last STI/HIV test? (!) DECLINE      Last PSA:   Prostate Specific Antigen Screen   Date Value Ref Range Status   2022 0.50 0.00 - 4.00 ug/L Final     ASCVD Risk   The 10-year ASCVD risk score (Alisa CAICEDO, et al., 2019) is: 22.4%    Values used to calculate the score:      Age: 59 years      Sex: Male      Is Non- : No      Diabetic: No      Tobacco smoker: No      Systolic Blood Pressure: 176 mmHg      Is BP treated: Yes      HDL Cholesterol: 50 mg/dL      Total Cholesterol: 254 mg/dL           Reviewed and updated as needed this visit by Provider   Tobacco  Allergies  Meds  Problems  Med Hx  Surg Hx  Fam Hx     Sexual Activity                   Objective    Exam  BP (!) 176/110   Pulse 93   Temp 97.4  F (36.3  C) (Temporal)   Resp 12   Ht 1.588 m (5' 2.5\")   Wt 70.5 kg (155 lb 8 oz)   SpO2 96%   BMI 27.99 kg/m     Estimated body mass index is 27.99 kg/m  as calculated from the following:    Height as of this encounter: 1.588 m (5' 2.5\").    Weight as of this encounter: 70.5 kg (155 lb 8 oz).    Physical Exam  GENERAL: alert and no distress  EYES: Eyes grossly normal to inspection, PERRL and conjunctivae and sclerae normal  HENT: ear canals and TM's normal, nose and mouth without ulcers or lesions  NECK: no adenopathy, no asymmetry, masses, or scars. No thyromegaly or nodules palpated.  RESP: lungs clear to auscultation - no rales, rhonchi or " wheezes  CV: regular rate and rhythm, normal S1 S2, no S3 or S4, no murmur, click or rub, no peripheral edema  ABDOMEN: soft, nontender, no hepatosplenomegaly, no masses and bowel sounds normal  Low Back  Inspection: No erythema, ecchymosis, or bony/muscle deformities of thoracic or lumbar spine.   Palpation: pain with palpation over direct lumbar spine, otherwise back non-tender.   ROM: 5/5 range of motion of lumbar spine with no elicited pain.   Straight leg raise: negative  Gait: Normal with no antalgia or foot drop.   Extremity: Neurovascularuly intact. Motor and sensory function of lower extremities are intact with no apparent deficits. Patellar reflexes 2+ bilaterally. DP pulse 2+ bilaterally.  SKIN: no suspicious lesions or rashes  NEURO: Normal strength and tone, mentation intact and speech normal  PSYCH: mentation appears normal, affect normal/bright          Signed Electronically by: Carlos Jennings PA-C

## 2024-10-18 ENCOUNTER — MYC MEDICAL ADVICE (OUTPATIENT)
Dept: FAMILY MEDICINE | Facility: CLINIC | Age: 59
End: 2024-10-18
Payer: COMMERCIAL

## 2024-10-18 DIAGNOSIS — I10 ESSENTIAL HYPERTENSION WITH GOAL BLOOD PRESSURE LESS THAN 140/90: Primary | ICD-10-CM

## 2024-10-25 RX ORDER — AMLODIPINE BESYLATE 5 MG/1
5 TABLET ORAL DAILY
Qty: 90 TABLET | Refills: 0 | Status: SHIPPED | OUTPATIENT
Start: 2024-10-25 | End: 2025-01-23

## 2024-10-25 NOTE — TELEPHONE ENCOUNTER
I want him to add norvasc 5mg daily to his current regimen - RX sent. Main side effect to watch out for his leg swelling. Please have him continue to monitor BP closely and follow-up via MyChart. He does need to complete BMP lab though in next week.     HTM

## 2024-10-28 NOTE — TELEPHONE ENCOUNTER
Writer responded via [employer].  LIZABETH PaceN, RN-BC  [facility]       There are no Wet Read(s) to document.

## 2025-01-21 ENCOUNTER — MYC MEDICAL ADVICE (OUTPATIENT)
Dept: FAMILY MEDICINE | Facility: CLINIC | Age: 60
End: 2025-01-21
Payer: COMMERCIAL

## 2025-01-21 DIAGNOSIS — Z12.11 COLON CANCER SCREENING: Primary | ICD-10-CM

## 2025-01-22 ENCOUNTER — LAB (OUTPATIENT)
Dept: LAB | Facility: CLINIC | Age: 60
End: 2025-01-22
Payer: COMMERCIAL

## 2025-01-22 DIAGNOSIS — I10 ESSENTIAL HYPERTENSION WITH GOAL BLOOD PRESSURE LESS THAN 140/90: ICD-10-CM

## 2025-01-22 DIAGNOSIS — Z00.00 ROUTINE GENERAL MEDICAL EXAMINATION AT A HEALTH CARE FACILITY: ICD-10-CM

## 2025-01-22 DIAGNOSIS — Z12.5 SCREENING FOR PROSTATE CANCER: ICD-10-CM

## 2025-01-22 DIAGNOSIS — R39.9 URINARY SYMPTOM OR SIGN: ICD-10-CM

## 2025-01-22 DIAGNOSIS — E04.1 THYROID NODULE: ICD-10-CM

## 2025-01-22 LAB
ALBUMIN SERPL BCG-MCNC: 4.6 G/DL (ref 3.5–5.2)
ALBUMIN UR-MCNC: NEGATIVE MG/DL
ALP SERPL-CCNC: 73 U/L (ref 40–150)
ALT SERPL W P-5'-P-CCNC: 56 U/L (ref 0–70)
ANION GAP SERPL CALCULATED.3IONS-SCNC: 12 MMOL/L (ref 7–15)
APPEARANCE UR: CLEAR
AST SERPL W P-5'-P-CCNC: 41 U/L (ref 0–45)
BILIRUB DIRECT SERPL-MCNC: 0.23 MG/DL (ref 0–0.3)
BILIRUB SERPL-MCNC: 0.8 MG/DL
BILIRUB UR QL STRIP: NEGATIVE
BUN SERPL-MCNC: 13.3 MG/DL (ref 8–23)
CALCIUM SERPL-MCNC: 10 MG/DL (ref 8.8–10.4)
CHLORIDE SERPL-SCNC: 100 MMOL/L (ref 98–107)
CHOLEST SERPL-MCNC: 243 MG/DL
COLOR UR AUTO: YELLOW
CREAT SERPL-MCNC: 1.02 MG/DL (ref 0.67–1.17)
EGFRCR SERPLBLD CKD-EPI 2021: 84 ML/MIN/1.73M2
FASTING STATUS PATIENT QL REPORTED: YES
FASTING STATUS PATIENT QL REPORTED: YES
GLUCOSE SERPL-MCNC: 128 MG/DL (ref 70–99)
GLUCOSE UR STRIP-MCNC: NEGATIVE MG/DL
HCO3 SERPL-SCNC: 27 MMOL/L (ref 22–29)
HDLC SERPL-MCNC: 52 MG/DL
HGB UR QL STRIP: NEGATIVE
KETONES UR STRIP-MCNC: NEGATIVE MG/DL
LDLC SERPL CALC-MCNC: 162 MG/DL
LEUKOCYTE ESTERASE UR QL STRIP: NEGATIVE
NITRATE UR QL: NEGATIVE
NONHDLC SERPL-MCNC: 191 MG/DL
PH UR STRIP: 7 [PH] (ref 5–8)
POTASSIUM SERPL-SCNC: 3.6 MMOL/L (ref 3.4–5.3)
PROT SERPL-MCNC: 7.5 G/DL (ref 6.4–8.3)
PSA SERPL DL<=0.01 NG/ML-MCNC: 0.57 NG/ML (ref 0–4.5)
SODIUM SERPL-SCNC: 139 MMOL/L (ref 135–145)
SP GR UR STRIP: 1.01 (ref 1–1.03)
TRIGL SERPL-MCNC: 144 MG/DL
TSH SERPL DL<=0.005 MIU/L-ACNC: 1.87 UIU/ML (ref 0.3–4.2)
UROBILINOGEN UR STRIP-ACNC: 1 E.U./DL

## 2025-01-22 PROCEDURE — 80053 COMPREHEN METABOLIC PANEL: CPT

## 2025-01-22 PROCEDURE — 36415 COLL VENOUS BLD VENIPUNCTURE: CPT

## 2025-01-22 PROCEDURE — 84443 ASSAY THYROID STIM HORMONE: CPT

## 2025-01-22 PROCEDURE — G0103 PSA SCREENING: HCPCS

## 2025-01-22 PROCEDURE — 82248 BILIRUBIN DIRECT: CPT

## 2025-01-22 PROCEDURE — 80061 LIPID PANEL: CPT

## 2025-01-22 PROCEDURE — 81003 URINALYSIS AUTO W/O SCOPE: CPT

## 2025-01-22 NOTE — TELEPHONE ENCOUNTER
Left message for patient to call back and ask to speak with an available triage nurse.    LIZABETH McgowanN, PHN, AMB-BC (she/her)  [facility] RN    Please ask location/sedation questions for colonoscopy   DM

## 2025-01-22 NOTE — TELEPHONE ENCOUNTER
[employer] message sent to patient.  NA Nicolas, BSN, PHN, RN-[facility]  [phone]

## 2025-01-22 NOTE — TELEPHONE ENCOUNTER
Patient returning call. Pt reported he would like to get his colonoscopy done at [facility]  in [place]. Pt reported he had a colonoscopy during covid when supplies were not available, they gave him something with glucose that mad him feel sick. Pt does not want to take the glucose again. In the past, pt had MAC colonoscopy.    Routed to Dr. Thomas to review    Ga TOWNSEND RN  [facility]

## 2025-02-10 ENCOUNTER — APPOINTMENT (OUTPATIENT)
Dept: CT IMAGING | Facility: CLINIC | Age: 60
End: 2025-02-10
Attending: PHYSICIAN ASSISTANT
Payer: COMMERCIAL

## 2025-02-10 ENCOUNTER — HOSPITAL ENCOUNTER (EMERGENCY)
Facility: CLINIC | Age: 60
Discharge: HOME | End: 2025-02-10
Admitting: PHYSICIAN ASSISTANT
Payer: COMMERCIAL

## 2025-02-10 VITALS
OXYGEN SATURATION: 96 % | HEIGHT: 63 IN | BODY MASS INDEX: 28 KG/M2 | SYSTOLIC BLOOD PRESSURE: 179 MMHG | TEMPERATURE: 97.8 F | WEIGHT: 158 LBS | DIASTOLIC BLOOD PRESSURE: 115 MMHG | HEART RATE: 108 BPM | RESPIRATION RATE: 18 BRPM
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
## 2025-02-10 DIAGNOSIS — S16.1XXA STRAIN OF NECK MUSCLE, INITIAL ENCOUNTER: ICD-10-CM

## 2025-02-10 DIAGNOSIS — S06.0X9A CONCUSSION WITH LOSS OF CONSCIOUSNESS, INITIAL ENCOUNTER: ICD-10-CM

## 2025-02-10 DIAGNOSIS — S40.011A CONTUSION OF RIGHT SHOULDER, INITIAL ENCOUNTER: ICD-10-CM

## 2025-02-10 PROCEDURE — 70450 CT HEAD/BRAIN W/O DYE: CPT

## 2025-02-10 PROCEDURE — 99284 EMERGENCY DEPT VISIT MOD MDM: CPT | Mod: 25

## 2025-02-10 PROCEDURE — 72125 CT NECK SPINE W/O DYE: CPT

## 2025-02-10 ASSESSMENT — COLUMBIA-SUICIDE SEVERITY RATING SCALE - C-SSRS
6. HAVE YOU EVER DONE ANYTHING, STARTED TO DO ANYTHING, OR PREPARED TO DO ANYTHING TO END YOUR LIFE?: NO
1. IN THE PAST MONTH, HAVE YOU WISHED YOU WERE DEAD OR WISHED YOU COULD GO TO SLEEP AND NOT WAKE UP?: NO
2. HAVE YOU ACTUALLY HAD ANY THOUGHTS OF KILLING YOURSELF IN THE PAST MONTH?: NO

## 2025-02-10 NOTE — ED NOTES
Primary assessment done by provider in lobby. Pt left prior to AVS or vitals. Pt ambulated out of ED without any issues.
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
## 2025-03-06 ENCOUNTER — APPOINTMENT (OUTPATIENT)
Dept: RADIOLOGY | Facility: CLINIC | Age: 60
End: 2025-03-06
Attending: STUDENT IN AN ORGANIZED HEALTH CARE EDUCATION/TRAINING PROGRAM
Payer: COMMERCIAL

## 2025-03-06 ENCOUNTER — APPOINTMENT (OUTPATIENT)
Dept: CT IMAGING | Facility: CLINIC | Age: 60
End: 2025-03-06
Attending: STUDENT IN AN ORGANIZED HEALTH CARE EDUCATION/TRAINING PROGRAM
Payer: COMMERCIAL

## 2025-03-06 ENCOUNTER — HOSPITAL ENCOUNTER (EMERGENCY)
Facility: CLINIC | Age: 60
End: 2025-03-06
Attending: STUDENT IN AN ORGANIZED HEALTH CARE EDUCATION/TRAINING PROGRAM
Payer: COMMERCIAL

## 2025-03-06 VITALS
RESPIRATION RATE: 20 BRPM | DIASTOLIC BLOOD PRESSURE: 78 MMHG | SYSTOLIC BLOOD PRESSURE: 137 MMHG | HEART RATE: 100 BPM | TEMPERATURE: 98 F | OXYGEN SATURATION: 92 %

## 2025-03-06 DIAGNOSIS — J18.9 ATYPICAL PNEUMONIA: ICD-10-CM

## 2025-03-06 DIAGNOSIS — R06.00 DYSPNEA, UNSPECIFIED TYPE: ICD-10-CM

## 2025-03-06 DIAGNOSIS — R00.0 SINUS TACHYCARDIA: ICD-10-CM

## 2025-03-06 DIAGNOSIS — D72.829 LEUKOCYTOSIS, UNSPECIFIED TYPE: ICD-10-CM

## 2025-03-06 DIAGNOSIS — R53.81 MALAISE: ICD-10-CM

## 2025-03-06 LAB
ALBUMIN SERPL BCG-MCNC: 4.5 G/DL (ref 3.5–5.2)
ALBUMIN UR-MCNC: NEGATIVE MG/DL
ALP SERPL-CCNC: 99 U/L (ref 40–150)
ALT SERPL W P-5'-P-CCNC: 42 U/L (ref 0–70)
ANION GAP SERPL CALCULATED.3IONS-SCNC: 18 MMOL/L (ref 7–15)
APPEARANCE UR: CLEAR
AST SERPL W P-5'-P-CCNC: 33 U/L (ref 0–45)
ATRIAL RATE - MUSE: 115 BPM
BASE EXCESS BLDV CALC-SCNC: 3.3 MMOL/L (ref -3–3)
BASOPHILS # BLD AUTO: 0 10E3/UL (ref 0–0.2)
BASOPHILS NFR BLD AUTO: 0 %
BILIRUB SERPL-MCNC: 1.1 MG/DL
BILIRUB UR QL STRIP: NEGATIVE
BUN SERPL-MCNC: 21.1 MG/DL (ref 8–23)
CALCIUM SERPL-MCNC: 10 MG/DL (ref 8.8–10.4)
CHLORIDE SERPL-SCNC: 87 MMOL/L (ref 98–107)
COLOR UR AUTO: ABNORMAL
CREAT SERPL-MCNC: 1.32 MG/DL (ref 0.67–1.17)
D DIMER PPP FEU-MCNC: 0.52 UG/ML FEU (ref 0–0.5)
DIASTOLIC BLOOD PRESSURE - MUSE: 87 MMHG
EGFRCR SERPLBLD CKD-EPI 2021: 61 ML/MIN/1.73M2
EOSINOPHIL # BLD AUTO: 0 10E3/UL (ref 0–0.7)
EOSINOPHIL NFR BLD AUTO: 0 %
ERYTHROCYTE [DISTWIDTH] IN BLOOD BY AUTOMATED COUNT: 11.9 % (ref 10–15)
FLUAV RNA SPEC QL NAA+PROBE: NEGATIVE
FLUBV RNA RESP QL NAA+PROBE: NEGATIVE
GLUCOSE SERPL-MCNC: 176 MG/DL (ref 70–99)
GLUCOSE UR STRIP-MCNC: NEGATIVE MG/DL
HCO3 BLDV-SCNC: 28 MMOL/L (ref 21–28)
HCO3 SERPL-SCNC: 24 MMOL/L (ref 22–29)
HCT VFR BLD AUTO: 48.6 % (ref 40–53)
HGB BLD-MCNC: 18 G/DL (ref 13.3–17.7)
HGB UR QL STRIP: NEGATIVE
HYALINE CASTS: 3 /LPF
IMM GRANULOCYTES # BLD: 0.1 10E3/UL
IMM GRANULOCYTES NFR BLD: 1 %
INTERPRETATION ECG - MUSE: NORMAL
KETONES UR STRIP-MCNC: NEGATIVE MG/DL
LACTATE SERPL-SCNC: 1.9 MMOL/L (ref 0.7–2)
LEUKOCYTE ESTERASE UR QL STRIP: NEGATIVE
LYMPHOCYTES # BLD AUTO: 0.6 10E3/UL (ref 0.8–5.3)
LYMPHOCYTES NFR BLD AUTO: 4 %
MAGNESIUM SERPL-MCNC: 1.7 MG/DL (ref 1.7–2.3)
MCH RBC QN AUTO: 31.5 PG (ref 26.5–33)
MCHC RBC AUTO-ENTMCNC: 37 G/DL (ref 31.5–36.5)
MCV RBC AUTO: 85 FL (ref 78–100)
MONOCYTES # BLD AUTO: 1.4 10E3/UL (ref 0–1.3)
MONOCYTES NFR BLD AUTO: 8 %
NEUTROPHILS # BLD AUTO: 14.5 10E3/UL (ref 1.6–8.3)
NEUTROPHILS NFR BLD AUTO: 87 %
NITRATE UR QL: NEGATIVE
NRBC # BLD AUTO: 0 10E3/UL
NRBC BLD AUTO-RTO: 0 /100
NT-PROBNP SERPL-MCNC: 112 PG/ML (ref 0–900)
O2/TOTAL GAS SETTING VFR VENT: 35 %
OXYHGB MFR BLDV: 44 % (ref 70–75)
P AXIS - MUSE: 54 DEGREES
PCO2 BLDV: 41 MM HG (ref 40–50)
PH BLDV: 7.44 [PH] (ref 7.32–7.43)
PH UR STRIP: 6 [PH] (ref 5–7)
PLATELET # BLD AUTO: 199 10E3/UL (ref 150–450)
PO2 BLDV: 24 MM HG (ref 25–47)
POTASSIUM SERPL-SCNC: 3.3 MMOL/L (ref 3.4–5.3)
PR INTERVAL - MUSE: 148 MS
PROT SERPL-MCNC: 7.6 G/DL (ref 6.4–8.3)
QRS DURATION - MUSE: 104 MS
QT - MUSE: 328 MS
QTC - MUSE: 453 MS
R AXIS - MUSE: 54 DEGREES
RBC # BLD AUTO: 5.71 10E6/UL (ref 4.4–5.9)
RBC URINE: <1 /HPF
RSV RNA SPEC NAA+PROBE: NEGATIVE
SAO2 % BLDV: 44.4 % (ref 70–75)
SARS-COV-2 RNA RESP QL NAA+PROBE: NEGATIVE
SODIUM SERPL-SCNC: 129 MMOL/L (ref 135–145)
SP GR UR STRIP: 1.03 (ref 1–1.03)
SYSTOLIC BLOOD PRESSURE - MUSE: 140 MMHG
T AXIS - MUSE: 45 DEGREES
TROPONIN T SERPL HS-MCNC: 7 NG/L
TROPONIN T SERPL HS-MCNC: <6 NG/L
UROBILINOGEN UR STRIP-MCNC: <2 MG/DL
VENTRICULAR RATE- MUSE: 115 BPM
WBC # BLD AUTO: 16.7 10E3/UL (ref 4–11)
WBC URINE: <1 /HPF

## 2025-03-06 PROCEDURE — 82040 ASSAY OF SERUM ALBUMIN: CPT | Performed by: STUDENT IN AN ORGANIZED HEALTH CARE EDUCATION/TRAINING PROGRAM

## 2025-03-06 PROCEDURE — 85025 COMPLETE CBC W/AUTO DIFF WBC: CPT | Performed by: STUDENT IN AN ORGANIZED HEALTH CARE EDUCATION/TRAINING PROGRAM

## 2025-03-06 PROCEDURE — 81003 URINALYSIS AUTO W/O SCOPE: CPT | Performed by: STUDENT IN AN ORGANIZED HEALTH CARE EDUCATION/TRAINING PROGRAM

## 2025-03-06 PROCEDURE — 85379 FIBRIN DEGRADATION QUANT: CPT | Performed by: STUDENT IN AN ORGANIZED HEALTH CARE EDUCATION/TRAINING PROGRAM

## 2025-03-06 PROCEDURE — 250N000011 HC RX IP 250 OP 636: Performed by: STUDENT IN AN ORGANIZED HEALTH CARE EDUCATION/TRAINING PROGRAM

## 2025-03-06 PROCEDURE — 83735 ASSAY OF MAGNESIUM: CPT | Performed by: STUDENT IN AN ORGANIZED HEALTH CARE EDUCATION/TRAINING PROGRAM

## 2025-03-06 PROCEDURE — 87637 SARSCOV2&INF A&B&RSV AMP PRB: CPT | Performed by: STUDENT IN AN ORGANIZED HEALTH CARE EDUCATION/TRAINING PROGRAM

## 2025-03-06 PROCEDURE — 71275 CT ANGIOGRAPHY CHEST: CPT

## 2025-03-06 PROCEDURE — 87040 BLOOD CULTURE FOR BACTERIA: CPT | Performed by: STUDENT IN AN ORGANIZED HEALTH CARE EDUCATION/TRAINING PROGRAM

## 2025-03-06 PROCEDURE — 258N000003 HC RX IP 258 OP 636: Performed by: STUDENT IN AN ORGANIZED HEALTH CARE EDUCATION/TRAINING PROGRAM

## 2025-03-06 PROCEDURE — 84484 ASSAY OF TROPONIN QUANT: CPT | Performed by: STUDENT IN AN ORGANIZED HEALTH CARE EDUCATION/TRAINING PROGRAM

## 2025-03-06 PROCEDURE — 36415 COLL VENOUS BLD VENIPUNCTURE: CPT | Performed by: STUDENT IN AN ORGANIZED HEALTH CARE EDUCATION/TRAINING PROGRAM

## 2025-03-06 PROCEDURE — 71046 X-RAY EXAM CHEST 2 VIEWS: CPT

## 2025-03-06 PROCEDURE — 96361 HYDRATE IV INFUSION ADD-ON: CPT

## 2025-03-06 PROCEDURE — 83605 ASSAY OF LACTIC ACID: CPT | Performed by: STUDENT IN AN ORGANIZED HEALTH CARE EDUCATION/TRAINING PROGRAM

## 2025-03-06 PROCEDURE — 99285 EMERGENCY DEPT VISIT HI MDM: CPT | Mod: 25

## 2025-03-06 PROCEDURE — 83880 ASSAY OF NATRIURETIC PEPTIDE: CPT | Performed by: STUDENT IN AN ORGANIZED HEALTH CARE EDUCATION/TRAINING PROGRAM

## 2025-03-06 PROCEDURE — 93005 ELECTROCARDIOGRAM TRACING: CPT | Performed by: STUDENT IN AN ORGANIZED HEALTH CARE EDUCATION/TRAINING PROGRAM

## 2025-03-06 PROCEDURE — 82805 BLOOD GASES W/O2 SATURATION: CPT | Performed by: STUDENT IN AN ORGANIZED HEALTH CARE EDUCATION/TRAINING PROGRAM

## 2025-03-06 RX ORDER — IOPAMIDOL 755 MG/ML
75 INJECTION, SOLUTION INTRAVASCULAR ONCE
Status: COMPLETED | OUTPATIENT
Start: 2025-03-06 | End: 2025-03-06

## 2025-03-06 RX ADMIN — SODIUM CHLORIDE 1000 ML: 0.9 INJECTION, SOLUTION INTRAVENOUS at 21:06

## 2025-03-06 RX ADMIN — IOPAMIDOL 75 ML: 755 INJECTION, SOLUTION INTRAVENOUS at 22:19

## 2025-03-06 RX ADMIN — SODIUM CHLORIDE 1000 ML: 9 INJECTION, SOLUTION INTRAVENOUS at 22:42

## 2025-03-06 ASSESSMENT — ACTIVITIES OF DAILY LIVING (ADL)
ADLS_ACUITY_SCORE: 42

## 2025-03-06 ASSESSMENT — COLUMBIA-SUICIDE SEVERITY RATING SCALE - C-SSRS
6. HAVE YOU EVER DONE ANYTHING, STARTED TO DO ANYTHING, OR PREPARED TO DO ANYTHING TO END YOUR LIFE?: NO
2. HAVE YOU ACTUALLY HAD ANY THOUGHTS OF KILLING YOURSELF IN THE PAST MONTH?: NO
1. IN THE PAST MONTH, HAVE YOU WISHED YOU WERE DEAD OR WISHED YOU COULD GO TO SLEEP AND NOT WAKE UP?: NO

## 2025-03-07 PROCEDURE — 250N000011 HC RX IP 250 OP 636: Performed by: FAMILY MEDICINE

## 2025-03-07 PROCEDURE — 250N000013 HC RX MED GY IP 250 OP 250 PS 637: Performed by: FAMILY MEDICINE

## 2025-03-07 PROCEDURE — 96361 HYDRATE IV INFUSION ADD-ON: CPT

## 2025-03-07 PROCEDURE — 96365 THER/PROPH/DIAG IV INF INIT: CPT | Mod: 59

## 2025-03-07 RX ORDER — AZITHROMYCIN 250 MG/1
250 TABLET, FILM COATED ORAL DAILY
Qty: 4 TABLET | Refills: 0 | Status: SHIPPED | OUTPATIENT
Start: 2025-03-08 | End: 2025-03-12

## 2025-03-07 RX ORDER — CEFTRIAXONE 2 G/1
2 INJECTION, POWDER, FOR SOLUTION INTRAMUSCULAR; INTRAVENOUS ONCE
Status: COMPLETED | OUTPATIENT
Start: 2025-03-07 | End: 2025-03-07

## 2025-03-07 RX ORDER — AZITHROMYCIN 500 MG/1
500 TABLET, FILM COATED ORAL ONCE
Status: COMPLETED | OUTPATIENT
Start: 2025-03-07 | End: 2025-03-07

## 2025-03-07 RX ADMIN — CEFTRIAXONE SODIUM 2 G: 2 INJECTION, POWDER, FOR SOLUTION INTRAMUSCULAR; INTRAVENOUS at 00:34

## 2025-03-07 RX ADMIN — AZITHROMYCIN DIHYDRATE 500 MG: 500 TABLET, FILM COATED ORAL at 00:34

## 2025-03-07 ASSESSMENT — ACTIVITIES OF DAILY LIVING (ADL): ADLS_ACUITY_SCORE: 42

## 2025-03-07 NOTE — ED NOTES
Patient used restroom in lobby and had diarrhea and started vomiting.  Pale, clammy,, and weak.  Patient taken back to ED room.

## 2025-03-07 NOTE — DISCHARGE INSTRUCTIONS
Start azithromycin tomorrow evening    Fluids and rest    Tylenol and ibuprofen as needed for fever and bodyaches

## 2025-03-07 NOTE — ED TRIAGE NOTES
Pt arrives to ED with c/o cough since last Saturday and woke up today with worsening shortness of breath. Pt also endorses to abdominal cramping. Diarrhea. Drowsy. Lightheaded dizzy.      Triage Assessment (Adult)       Row Name 03/06/25 2013          Triage Assessment    Airway WDL WDL        Respiratory WDL    Respiratory WDL X;rhythm/pattern;cough     Rhythm/Pattern, Respiratory shortness of breath     Cough Frequency frequent     Cough Type congested;productive        Skin Circulation/Temperature WDL    Skin Circulation/Temperature WDL WDL        Cardiac WDL    Cardiac WDL WDL        Peripheral/Neurovascular WDL    Peripheral Neurovascular WDL WDL        Cognitive/Neuro/Behavioral WDL    Cognitive/Neuro/Behavioral WDL WDL
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
## 2025-03-12 LAB
BACTERIA BLD CULT: NO GROWTH
BACTERIA BLD CULT: NO GROWTH

## 2025-05-12 ENCOUNTER — TELEPHONE (OUTPATIENT)
Dept: GASTROENTEROLOGY | Facility: CLINIC | Age: 60
End: 2025-05-12
Payer: COMMERCIAL

## 2025-05-12 ENCOUNTER — HOSPITAL ENCOUNTER (OUTPATIENT)
Facility: AMBULATORY SURGERY CENTER | Age: 60
End: 2025-05-12
Attending: SURGERY
Payer: COMMERCIAL

## 2025-05-12 DIAGNOSIS — K57.32 DIVERTICULITIS OF LARGE INTESTINE WITHOUT PERFORATION OR ABSCESS WITHOUT BLEEDING: Primary | ICD-10-CM

## 2025-05-12 NOTE — TELEPHONE ENCOUNTER
"Endoscopy Scheduling Screen    Caller: patient    Have you had any respiratory illness or flu-like symptoms in the last 10 days?  No    What is your communication preference for Instructions and/or Bowel Prep?   MyChart    What insurance is in the chart?  Other:  [facility] [employer]    Ordering/Referring Provider:   EVELIO COVARRUBIAS        (If ordering provider performs procedure, schedule with ordering provider unless otherwise instructed. )    BMI: Estimated body mass index is 27.99 kg/m  as calculated from the following:    Height as of 2/10/25: 1.6 m (5' 3\").    Weight as of 2/10/25: 71.7 kg (158 lb).     Sedation Ordered  moderate sedation.   If patient BMI > 50 do not schedule in [facility].    If patient BMI > 45 do not schedule at [facility].    Are you taking methadone or Suboxone?  NO, No RN review required.    Have you been diagnosed and are being treated for severe PTSD or severe anxiety?  NO, No RN review required.    Are you taking any prescription medications for pain 3 or more times per week?   NO, No RN review required.    Do you have a history of malignant hyperthermia?  No    (Females) Are you currently pregnant?   No     Have you been diagnosed or told you have pulmonary hypertension?   No    Do you have an LVAD?  No    Have you been told you have moderate to severe sleep apnea?  No.    Have you been told you have COPD, asthma, or any other lung disease?  No    Has your doctor ordered any cardiac tests like echo, angiogram, stress test, ablation, or EKG, that you have not completed yet?  No    Do you  have a history of any heart conditions?  No     Have you ever had or are you waiting for an organ transplant?  No. Continue scheduling, no site restrictions.    Have you had a stroke or transient ischemic attack (TIA aka \"mini stroke\") in the last 2 years?   No.    Have you been diagnosed with or been told you have cirrhosis of the liver?   No.    Are you currently on dialysis?   No    Do you need assistance " "transferring?   No    BMI: Estimated body mass index is 27.99 kg/m  as calculated from the following:    Height as of 2/10/25: 1.6 m (5' 3\").    Weight as of 2/10/25: 71.7 kg (158 lb).     Is patients BMI > 40 and scheduling location [facility]?  No    Do you take an injectable or oral medication for weight loss or diabetes (excluding insulin)?  No    Do you take the medication Naltrexone?  No    Do you take blood thinners?  No       Prep   Are you currently on dialysis or do you have chronic kidney disease?  No    Do you have a diagnosis of diabetes?  No    Do you have a diagnosis of cystic fibrosis (CF)?  No    On a regular basis do you go 3 -5 days between bowel movements?  No    BMI > 40?  No    Preferred Pharmacy:    [facility]/pharmacy #[id number] - [place], [place] - [address] [place] [address]  Phone: [phone] Fax: [phone]    Final Scheduling Details     Procedure scheduled  Colonoscopy    Surgeon:  CHRISTINE     Date of procedure:  08/26/2025     Pre-OP / PAC:   No - Not required for this site.    Location  [facility] - Patient preference.    Sedation   MAC/Deep Sedation PER PREVIOUS PROCEDURE NOTE.      Patient Reminders:   You will receive a call from a Nurse to review instructions and health history.  This assessment must be completed prior to your procedure.  Failure to complete the Nurse assessment may result in the procedure being cancelled.      On the day of your procedure, please designate an adult(s) who can drive you home stay with you for the next 24 hours. The medicines used in the exam will make you sleepy. You will not be able to drive.      You cannot take public transportation, ride share services, or non-medical taxi service without a responsible caregiver.  Medical transport services are allowed with the requirement that a responsible caregiver will receive you at your destination.  We require that drivers and caregivers are confirmed prior to your procedure.  "

## 2025-08-05 ENCOUNTER — TELEPHONE (OUTPATIENT)
Dept: GASTROENTEROLOGY | Facility: CLINIC | Age: 60
End: 2025-08-05
Payer: COMMERCIAL

## 2025-08-26 ENCOUNTER — RESULTS ONLY (OUTPATIENT)
Dept: SURGERY | Facility: AMBULATORY SURGERY CENTER | Age: 60
End: 2025-08-26
Payer: COMMERCIAL

## 2025-08-26 ENCOUNTER — TRANSFERRED RECORDS (OUTPATIENT)
Dept: HEALTH INFORMATION MANAGEMENT | Facility: CLINIC | Age: 60
End: 2025-08-26
Payer: COMMERCIAL

## 2025-08-26 LAB — COLONOSCOPY: NORMAL

## 2025-08-26 PROCEDURE — 45380 COLONOSCOPY AND BIOPSY: CPT | Mod: PT | Performed by: SURGERY

## 2025-08-26 RX ORDER — LEVOFLOXACIN 500 MG/1
500 TABLET, FILM COATED ORAL DAILY
Qty: 1 TABLET | Refills: 1 | Status: SHIPPED | OUTPATIENT
Start: 2025-08-26

## 2025-08-26 RX ORDER — METRONIDAZOLE 500 MG/1
500 TABLET ORAL 3 TIMES DAILY
Qty: 21 TABLET | Refills: 0 | Status: SHIPPED | OUTPATIENT
Start: 2025-08-26 | End: 2025-09-02

## (undated) DEVICE — ADH SKIN CLOSURE PREMIERPRO EXOFIN 1.0ML 3470

## (undated) DEVICE — LINEN TOWEL PACK X10 5473

## (undated) DEVICE — ESU GROUND PAD ADULT W/CORD E7507

## (undated) DEVICE — SU VICRYL 0 UR-6 27" J603H

## (undated) DEVICE — GLOVE PROTEXIS POWDER FREE SMT 6.5  2D72PT65X

## (undated) DEVICE — ANTIFOG SOLUTION W/FOAM PAD 31142527

## (undated) DEVICE — ESU PENCIL W/HOLSTER

## (undated) DEVICE — DRSG BANDAID 1X3" FABRIC CURITY LATEX FREE KC44101

## (undated) DEVICE — LINEN FULL SHEET 5511

## (undated) DEVICE — PREP POVIDONE IODINE SCRUB 7.5% 4OZ APL82212

## (undated) DEVICE — SU MONOCRYL 4-0 PS-2 18" UND Y496G

## (undated) DEVICE — SYSTEM CLEARIFY VISUALIZATION 21-345

## (undated) DEVICE — BLADE KNIFE SURG 15 371115

## (undated) DEVICE — SYR BULB IRRIG 50ML LATEX FREE 0035280

## (undated) DEVICE — SU MONOCRYL 4-0 PS-2 27" UND Y426H

## (undated) DEVICE — LINEN GOWN XLG 5407

## (undated) DEVICE — NDL 22GA 1.5"

## (undated) DEVICE — SUPPORTER ATHLETIC LG LATEX 202636

## (undated) DEVICE — GLOVE PROTEXIS W/NEU-THERA 6.5  2D73TE65

## (undated) DEVICE — DRAIN PENROSE 0.50"X18" LATEX FREE GR203

## (undated) DEVICE — PACK LAP CHOLE CUSTOM ASC

## (undated) DEVICE — DISSECTOR BALLOON SPACEMAKER PRO BTT & RND SMBTTRNDX

## (undated) DEVICE — SUCTION MANIFOLD NEPTUNE 2 SYS 1 PORT 702-025-000

## (undated) DEVICE — NDL BLUNT 18GA 1" W/O FILTER 305181

## (undated) DEVICE — SU DERMABOND ADVANCED .7ML DNX12

## (undated) DEVICE — ENDO SNARE EXACTO COLD 9MM LOOP 2.4MMX230CM 00711115

## (undated) DEVICE — PREP SKIN SCRUB TRAY 4461A

## (undated) DEVICE — DRAPE LAP PEDS DISP 29492

## (undated) DEVICE — SOL NACL 0.9% IRRIG 1000ML BOTTLE 2F7124

## (undated) DEVICE — PREP POVIDONE IODINE SOLUTION 10% 4OZ

## (undated) DEVICE — TAPE MEDIPORE 2"X2YD 2962S

## (undated) DEVICE — TUBING INSUFFLATION W/FILTER 10FT GS1016

## (undated) DEVICE — SU VICRYL 0 CT-2 27" J334H

## (undated) DEVICE — PREP CHLORAPREP 26ML TINTED ORANGE  260815

## (undated) DEVICE — ENDO TRAP POLYP QUICK CATCH 710201

## (undated) DEVICE — DRSG STERI STRIP 1/2X4" R1547

## (undated) DEVICE — GLOVE PROTEXIS BLUE W/NEU-THERA 6.5  2D73EB65

## (undated) DEVICE — SU VICRYL 3-0 RB-1 27" J305H

## (undated) DEVICE — SU VICRYL 3-0 SH 27" UND J416H

## (undated) DEVICE — SU SILK 3-0 SH 30" K832H

## (undated) DEVICE — LINEN TOWEL PACK X5 5464

## (undated) DEVICE — SOL NACL 0.9% IRRIG 500ML BOTTLE 2F7123

## (undated) DEVICE — BAG CLEAR TRASH 1.3M 39X33" P4040C

## (undated) DEVICE — LINEN HALF SHEET 5512

## (undated) DEVICE — PACK MINOR CUSTOM RIDGES SBA32RMRMA

## (undated) DEVICE — GLOVE PROTEXIS BLUE W/NEU-THERA 7.0  2D73EB70

## (undated) RX ORDER — GABAPENTIN 300 MG/1
CAPSULE ORAL
Status: DISPENSED
Start: 2018-12-03

## (undated) RX ORDER — ONDANSETRON 2 MG/ML
INJECTION INTRAMUSCULAR; INTRAVENOUS
Status: DISPENSED
Start: 2023-03-10

## (undated) RX ORDER — GINSENG 100 MG
CAPSULE ORAL
Status: DISPENSED
Start: 2019-10-31

## (undated) RX ORDER — HYDROMORPHONE HYDROCHLORIDE 1 MG/ML
INJECTION, SOLUTION INTRAMUSCULAR; INTRAVENOUS; SUBCUTANEOUS
Status: DISPENSED
Start: 2018-12-03

## (undated) RX ORDER — FENTANYL CITRATE 50 UG/ML
INJECTION, SOLUTION INTRAMUSCULAR; INTRAVENOUS
Status: DISPENSED
Start: 2019-10-31

## (undated) RX ORDER — ONDANSETRON 2 MG/ML
INJECTION INTRAMUSCULAR; INTRAVENOUS
Status: DISPENSED
Start: 2018-12-03

## (undated) RX ORDER — CEFAZOLIN SODIUM 1 G/3ML
INJECTION, POWDER, FOR SOLUTION INTRAMUSCULAR; INTRAVENOUS
Status: DISPENSED
Start: 2018-12-03

## (undated) RX ORDER — FENTANYL CITRATE 50 UG/ML
INJECTION, SOLUTION INTRAMUSCULAR; INTRAVENOUS
Status: DISPENSED
Start: 2018-12-03

## (undated) RX ORDER — LIDOCAINE HYDROCHLORIDE 10 MG/ML
INJECTION, SOLUTION EPIDURAL; INFILTRATION; INTRACAUDAL; PERINEURAL
Status: DISPENSED
Start: 2019-10-31

## (undated) RX ORDER — SCOLOPAMINE TRANSDERMAL SYSTEM 1 MG/1
PATCH, EXTENDED RELEASE TRANSDERMAL
Status: DISPENSED
Start: 2018-12-03

## (undated) RX ORDER — IPRATROPIUM BROMIDE AND ALBUTEROL SULFATE 2.5; .5 MG/3ML; MG/3ML
SOLUTION RESPIRATORY (INHALATION)
Status: DISPENSED
Start: 2018-12-03

## (undated) RX ORDER — LABETALOL HYDROCHLORIDE 5 MG/ML
INJECTION, SOLUTION INTRAVENOUS
Status: DISPENSED
Start: 2018-12-03

## (undated) RX ORDER — PROPOFOL 10 MG/ML
INJECTION, EMULSION INTRAVENOUS
Status: DISPENSED
Start: 2018-12-03

## (undated) RX ORDER — BUPIVACAINE HYDROCHLORIDE 2.5 MG/ML
INJECTION, SOLUTION EPIDURAL; INFILTRATION; INTRACAUDAL
Status: DISPENSED
Start: 2018-12-03

## (undated) RX ORDER — BUPIVACAINE HYDROCHLORIDE 5 MG/ML
INJECTION, SOLUTION EPIDURAL; INTRACAUDAL
Status: DISPENSED
Start: 2019-10-31

## (undated) RX ORDER — OXYCODONE HYDROCHLORIDE 5 MG/1
TABLET ORAL
Status: DISPENSED
Start: 2018-12-03

## (undated) RX ORDER — FENTANYL CITRATE 50 UG/ML
INJECTION, SOLUTION INTRAMUSCULAR; INTRAVENOUS
Status: DISPENSED
Start: 2023-03-10

## (undated) RX ORDER — ACETAMINOPHEN 325 MG/1
TABLET ORAL
Status: DISPENSED
Start: 2018-12-03

## (undated) RX ORDER — CEFAZOLIN SODIUM 2 G/100ML
INJECTION, SOLUTION INTRAVENOUS
Status: DISPENSED
Start: 2019-10-31